# Patient Record
Sex: MALE | Race: WHITE | NOT HISPANIC OR LATINO | Employment: FULL TIME | URBAN - METROPOLITAN AREA
[De-identification: names, ages, dates, MRNs, and addresses within clinical notes are randomized per-mention and may not be internally consistent; named-entity substitution may affect disease eponyms.]

---

## 2017-01-30 DIAGNOSIS — E11.21 TYPE 2 DIABETES MELLITUS WITH DIABETIC NEPHROPATHY (H): Primary | ICD-10-CM

## 2017-02-01 RX ORDER — GLIMEPIRIDE 2 MG/1
TABLET ORAL
Qty: 90 TABLET | Refills: 1 | Status: SHIPPED | OUTPATIENT
Start: 2017-02-01 | End: 2017-06-16

## 2017-02-01 NOTE — TELEPHONE ENCOUNTER
Prescription approved per [facility] Refill Protocol.  Roberta Wilks RN- Triage [employer]

## 2017-02-01 NOTE — TELEPHONE ENCOUNTER
Glimepiride 2 mg         Last Written Prescription Date: 8/1/16  Last Fill Quantity: 90, # refills: 1  Last Office Visit with G, [facility] or [facility] prescribing provider:  10/3/16        BP Readings from Last 3 Encounters:   10/03/16 124/70   08/22/16 120/70   07/12/16 120/68     MICROL       <5   10/3/2016  No results found for this basename: microalbumin  CREATININE   Date Value Ref Range Status   08/23/2016 0.91 0.66 - 1.25 mg/dL Final   ]  GFR ESTIMATE   Date Value Ref Range Status   08/23/2016 84 >60 mL/min/1.7m2 Final   07/13/2016 >90 >60 mL/min/1.7m2 Final   11/16/2015 75 >60 mL/min/1.7m2 Final     GFR ESTIMATE IF BLACK   Date Value Ref Range Status   08/23/2016 >90 >60 mL/min/1.7m2 Final   07/13/2016 >90 >60 mL/min/1.7m2 Final   11/16/2015 >90 >60 mL/min/1.7m2 Final     CHOL      112   7/13/2016  HDL       73   7/13/2016  LDL       30   7/13/2016  LDL     89.4   9/20/2012  TRIG       44   7/13/2016  CHOLHDLRATIO      1.9   11/16/2015  AST       22   7/13/2016  ALT       29   7/13/2016  A1C      7.3   10/3/2016  A1C      7.0   7/13/2016  A1C      6.7   2/17/2016  A1C      8.2   11/16/2015  A1C      7.2   5/4/2015  POTASSIUM   Date Value Ref Range Status   08/23/2016 4.1 3.4 - 5.3 mmol/L Final

## 2017-02-08 DIAGNOSIS — E11.21 TYPE 2 DIABETES MELLITUS WITH DIABETIC NEPHROPATHY, WITHOUT LONG-TERM CURRENT USE OF INSULIN (H): Primary | ICD-10-CM

## 2017-02-13 NOTE — TELEPHONE ENCOUNTER
Prescription approved per [facility] Refill Protocol.  Roberta Wilks RN- Triage [employer]

## 2017-04-03 DIAGNOSIS — I10 ESSENTIAL HYPERTENSION WITH GOAL BLOOD PRESSURE LESS THAN 140/90: ICD-10-CM

## 2017-04-04 NOTE — TELEPHONE ENCOUNTER
amLODIPine (NORVASC) 5 MG      Last Written Prescription Date: 10/4/16  Last Fill Quantity: 90, # refills: 1  Last Office Visit with G, P or [facility] prescribing provider: 10/3/16       Potassium   Date Value Ref Range Status   08/23/2016 4.1 3.4 - 5.3 mmol/L Final     Creatinine   Date Value Ref Range Status   08/23/2016 0.91 0.66 - 1.25 mg/dL Final     BP Readings from Last 3 Encounters:   10/03/16 124/70   08/22/16 120/70   07/12/16 120/68

## 2017-04-05 RX ORDER — AMLODIPINE BESYLATE 5 MG/1
TABLET ORAL
Qty: 90 TABLET | Refills: 1 | Status: SHIPPED | OUTPATIENT
Start: 2017-04-05 | End: 2017-09-05

## 2017-04-27 ENCOUNTER — TELEPHONE (OUTPATIENT)
Dept: FAMILY MEDICINE | Facility: CLINIC | Age: 67
End: 2017-04-27

## 2017-04-27 NOTE — TELEPHONE ENCOUNTER
Reason for Call:  Form, our goal is to have forms completed with 72 hours, however, some forms may require a visit or additional information.    Type of letter, form or note:  Rosaura    Who is the form from?: Rosaura (if other please explain)    Where did the form come from: form was mailed in    What clinic location was the form placed at?: [facility]    Where the form was placed: Dr's Box: Gavin Sevilla MD    What number is listed as a contact on the form?: Rosaura, self addressed stamped envelope enclosed       Additional comments: Rosaura - Diabetic Form - Lancets    Call taken on 4/27/2017 at 2:01 PM by CATA REDDING

## 2017-05-01 DIAGNOSIS — E11.21 TYPE 2 DIABETES MELLITUS WITH DIABETIC NEPHROPATHY, WITHOUT LONG-TERM CURRENT USE OF INSULIN (H): ICD-10-CM

## 2017-05-01 NOTE — TELEPHONE ENCOUNTER
metFORMIN (GLUCOPHAGE) 500 MG tablet         Last Written Prescription Date: 2/13/17  Last Fill Quantity: 90, # refills: 0  Last Office Visit with [facility], [facility] or [facility] prescribing provider:  10/3/16        BP Readings from Last 3 Encounters:   10/03/16 124/70   08/22/16 120/70   07/12/16 120/68     Lab Results   Component Value Date    MICROL <5 10/03/2016     Lab Results   Component Value Date    UMALCR Unable to calculate due to low value 10/03/2016     Creatinine   Date Value Ref Range Status   08/23/2016 0.91 0.66 - 1.25 mg/dL Final   ]  GFR Estimate   Date Value Ref Range Status   08/23/2016 84 >60 mL/min/1.7m2 Final   07/13/2016 >90 >60 mL/min/1.7m2 Final   11/16/2015 75 >60 mL/min/1.7m2 Final     GFR Estimate If Black   Date Value Ref Range Status   08/23/2016 >90 >60 mL/min/1.7m2 Final   07/13/2016 >90 >60 mL/min/1.7m2 Final   11/16/2015 >90 >60 mL/min/1.7m2 Final     Lab Results   Component Value Date    CHOL 112 07/13/2016     Lab Results   Component Value Date    HDL 73 07/13/2016     Lab Results   Component Value Date    LDL 30 07/13/2016     Lab Results   Component Value Date    TRIG 44 07/13/2016     Lab Results   Component Value Date    CHOLHDLRATIO 1.9 11/16/2015     Lab Results   Component Value Date    AST 22 07/13/2016     Lab Results   Component Value Date    ALT 29 07/13/2016     Lab Results   Component Value Date    A1C 7.3 10/03/2016    A1C 7.0 07/13/2016    A1C 6.7 02/17/2016    A1C 8.2 11/16/2015    A1C 7.2 05/04/2015     Potassium   Date Value Ref Range Status   08/23/2016 4.1 3.4 - 5.3 mmol/L Final

## 2017-05-16 ENCOUNTER — TELEPHONE (OUTPATIENT)
Dept: FAMILY MEDICINE | Facility: CLINIC | Age: 67
End: 2017-05-16

## 2017-05-16 NOTE — TELEPHONE ENCOUNTER
Faxed forms back to [phone].  Dr. Sevilla asked me to call the patient and tell him that he needs to schedule an appointment to see him. I left him a VM asking him to make one.

## 2017-05-16 NOTE — TELEPHONE ENCOUNTER
Reason for Call:  Form, our goal is to have forms completed with 72 hours, however, some forms may require a visit or additional information.    Type of letter, form or note:  medical    Who is the form from?: Rosaura     Where did the form come from: form was faxed in    What clinic location was the form placed at?: [facility]    Where the form was placed: 's Box: Gavin Sevilla MD    What number is listed as a contact on the form?: [phone]       Additional comments: Rosaura diabetic     Call taken on 5/16/2017 at 1:10 PM by Rizwana Winston

## 2017-06-16 DIAGNOSIS — E78.5 HYPERLIPIDEMIA LDL GOAL <70: ICD-10-CM

## 2017-06-16 DIAGNOSIS — I10 ESSENTIAL HYPERTENSION WITH GOAL BLOOD PRESSURE LESS THAN 140/90: ICD-10-CM

## 2017-06-16 DIAGNOSIS — E11.21 TYPE 2 DIABETES MELLITUS WITH DIABETIC NEPHROPATHY (H): ICD-10-CM

## 2017-06-16 RX ORDER — GLIMEPIRIDE 2 MG/1
TABLET ORAL
Qty: 90 TABLET | Refills: 0 | Status: SHIPPED | OUTPATIENT
Start: 2017-06-16 | End: 2017-09-05

## 2017-06-16 RX ORDER — ATORVASTATIN CALCIUM 40 MG/1
TABLET, FILM COATED ORAL
Qty: 90 TABLET | Refills: 1 | Status: SHIPPED | OUTPATIENT
Start: 2017-06-16 | End: 2017-06-19

## 2017-06-16 RX ORDER — LOSARTAN POTASSIUM 100 MG/1
TABLET ORAL
Qty: 90 TABLET | Refills: 1 | Status: SHIPPED | OUTPATIENT
Start: 2017-06-16 | End: 2017-06-23

## 2017-06-16 NOTE — TELEPHONE ENCOUNTER
glimipiride ( amaryl) 2 mg    Last Written Prescription Date: 4/5/17  Last Fill Quantity: 90, # refills: 1  Last Office Visit with [facility], [facility] or [facility] prescribing provider:  10/3/16        BP Readings from Last 3 Encounters:   10/03/16 124/70   08/22/16 120/70   07/12/16 120/68     Lab Results   Component Value Date    MICROL <5 10/03/2016     Lab Results   Component Value Date    UMALCR Unable to calculate due to low value 10/03/2016     Creatinine   Date Value Ref Range Status   08/23/2016 0.91 0.66 - 1.25 mg/dL Final   ]  GFR Estimate   Date Value Ref Range Status   08/23/2016 84 >60 mL/min/1.7m2 Final   07/13/2016 >90 >60 mL/min/1.7m2 Final   11/16/2015 75 >60 mL/min/1.7m2 Final     GFR Estimate If Black   Date Value Ref Range Status   08/23/2016 >90 >60 mL/min/1.7m2 Final   07/13/2016 >90 >60 mL/min/1.7m2 Final   11/16/2015 >90 >60 mL/min/1.7m2 Final     Lab Results   Component Value Date    CHOL 112 07/13/2016     Lab Results   Component Value Date    HDL 73 07/13/2016     Lab Results   Component Value Date    LDL 30 07/13/2016     Lab Results   Component Value Date    TRIG 44 07/13/2016     Lab Results   Component Value Date    CHOLHDLRATIO 1.9 11/16/2015     Lab Results   Component Value Date    AST 22 07/13/2016     Lab Results   Component Value Date    ALT 29 07/13/2016     Lab Results   Component Value Date    A1C 7.3 10/03/2016    A1C 7.0 07/13/2016    A1C 6.7 02/17/2016    A1C 8.2 11/16/2015    A1C 7.2 05/04/2015     Potassium   Date Value Ref Range Status   08/23/2016 4.1 3.4 - 5.3 mmol/L Final   Atorvastatin (lipitor) 40 mg     Last Written Prescription Date: 8/19/16  Last Fill Quantity: 90, # refills: 3    Last Office Visit with [facility], [facility] or [facility] prescribing provider:  10/3/16     Future Office Visit:      Cholesterol   Date Value Ref Range Status   07/13/2016 112 <200 mg/dL Final     HDL Cholesterol   Date Value Ref Range Status   07/13/2016 73 >39 mg/dL Final     LDL Cholesterol Calculated    Date Value Ref Range Status   07/13/2016 30 <100 mg/dL Final     Comment:     Desirable:       <100 mg/dl     Triglycerides   Date Value Ref Range Status   07/13/2016 44 <150 mg/dL Final     Comment:     Fasting specimen     Cholesterol/HDL Ratio   Date Value Ref Range Status   11/16/2015 1.9 0.0 - 5.0 Final     ALT   Date Value Ref Range Status   07/13/2016 29 0 - 70 U/L Final      Losartan (cozaar) 100 mg     Last Written Prescription Date: 8/19/16 Last Fill Quantity: 90, # refills: 3  [facility] Visit with [facility], [facility] or [facility] prescribing provider: percy       Potassium   Date Value Ref Range Status   08/23/2016 4.1 3.4 - 5.3 mmol/L Final     Creatinine   Date Value Ref Range Status   08/23/2016 0.91 0.66 - 1.25 mg/dL Final     BP Readings from Last 3 Encounters:   10/03/16 124/70   08/22/16 120/70   07/12/16 120/68

## 2017-06-19 DIAGNOSIS — E78.5 HYPERLIPIDEMIA LDL GOAL <70: ICD-10-CM

## 2017-06-19 RX ORDER — ATORVASTATIN CALCIUM 40 MG/1
TABLET, FILM COATED ORAL
Qty: 90 TABLET | Refills: 3 | OUTPATIENT
Start: 2017-06-19

## 2017-06-19 RX ORDER — LOSARTAN POTASSIUM 100 MG/1
TABLET ORAL
Qty: 90 TABLET | Refills: 3 | OUTPATIENT
Start: 2017-06-19

## 2017-06-20 ENCOUNTER — TELEPHONE (OUTPATIENT)
Dept: FAMILY MEDICINE | Facility: CLINIC | Age: 67
End: 2017-06-20

## 2017-06-20 DIAGNOSIS — I10 HYPERTENSION GOAL BP (BLOOD PRESSURE) < 140/90: ICD-10-CM

## 2017-06-20 DIAGNOSIS — Z12.5 SCREENING FOR PROSTATE CANCER: ICD-10-CM

## 2017-06-20 DIAGNOSIS — E11.21 TYPE 2 DIABETES MELLITUS WITH DIABETIC NEPHROPATHY, WITHOUT LONG-TERM CURRENT USE OF INSULIN (H): ICD-10-CM

## 2017-06-20 DIAGNOSIS — E78.5 HYPERLIPIDEMIA LDL GOAL <100: Primary | ICD-10-CM

## 2017-06-20 NOTE — TELEPHONE ENCOUNTER
ATORVASTATIN CALCIUM 40 MG Tablet    Last Written Prescription Date: 06/16/2017  Last Fill Quantity: 90, # refills: 1  Last Office Visit with [facility], [facility] or [facility] prescribing provider: 10/03/2016       Lab Results   Component Value Date    CHOL 112 07/13/2016     Lab Results   Component Value Date    HDL 73 07/13/2016     Lab Results   Component Value Date    LDL 30 07/13/2016     Lab Results   Component Value Date    TRIG 44 07/13/2016     Lab Results   Component Value Date    CHOLHDLRATIO 1.9 11/16/2015

## 2017-06-20 NOTE — TELEPHONE ENCOUNTER
Reason for Call: Request for an order:    Order or referral being requested: Pre-Physical Labs    Date needed: as soon as possible    Has the patient been seen by the PCP for this problem? YES    Additional comments: Patient is scheduled for a Medicare wellness exam on 8/23/17 and he is requesting to have pre-physical labs drawn beforehand. Please call patient to schedule a lab only appointment after the orders have been approved.     Phone number Patient can be reached at:  Home number on file [phone] (home) or Work number on file:  [phone] (work)    Best Time:  Anytime    Can we leave a detailed message on this number?  YES    Call taken on 6/20/2017 at 11:29 AM by David Burnette

## 2017-06-21 RX ORDER — ATORVASTATIN CALCIUM 40 MG/1
TABLET, FILM COATED ORAL
Qty: 90 TABLET | Refills: 0 | Status: SHIPPED | OUTPATIENT
Start: 2017-06-21 | End: 2017-09-05

## 2017-06-23 DIAGNOSIS — I10 ESSENTIAL HYPERTENSION WITH GOAL BLOOD PRESSURE LESS THAN 140/90: ICD-10-CM

## 2017-06-26 RX ORDER — LOSARTAN POTASSIUM 100 MG/1
TABLET ORAL
Qty: 90 TABLET | Refills: 0 | Status: SHIPPED | OUTPATIENT
Start: 2017-06-26 | End: 2017-09-05

## 2017-06-26 NOTE — TELEPHONE ENCOUNTER
cozaar      Last Written Prescription Date: 6-16-17  Last Fill Quantity: 90, # refills: 1  Last Office Visit with [facility], [facility] or [facility] prescribing provider: 10-3-16  Next 5 appointments (look out 90 days)     Aug 23, 2017  7:30 AM CDT   PHYSICAL with Gavin [facility] MD   [facility]    [address] [place] [id number]   [phone]                   Potassium   Date Value Ref Range Status   08/23/2016 4.1 3.4 - 5.3 mmol/L Final     Creatinine   Date Value Ref Range Status   08/23/2016 0.91 0.66 - 1.25 mg/dL Final     BP Readings from Last 3 Encounters:   10/03/16 124/70   08/22/16 120/70   07/12/16 120/68     Prescription approved per [facility] Refill Protocol.

## 2017-06-27 ENCOUNTER — TRANSFERRED RECORDS (OUTPATIENT)
Dept: HEALTH INFORMATION MANAGEMENT | Facility: CLINIC | Age: 67
End: 2017-06-27

## 2017-07-19 DIAGNOSIS — E11.21 TYPE 2 DIABETES MELLITUS WITH DIABETIC NEPHROPATHY, WITHOUT LONG-TERM CURRENT USE OF INSULIN (H): ICD-10-CM

## 2017-07-20 NOTE — TELEPHONE ENCOUNTER
metFORMIN (GLUCOPHAGE) 500 MG tablet         Last Written Prescription Date: 5/3/17  Last Fill Quantity: 90, # refills: 0  Last Office Visit with [facility], [facility] or  [facility] prescribing provider:  10/3/16   Next 5 appointments (look out 90 days)     Aug 23, 2017  7:30 AM CDT   PHYSICAL with Gavin Sevilla MD   [facility]    [address] [id number] [phone]                   BP Readings from Last 3 Encounters:   10/03/16 124/70   08/22/16 120/70   07/12/16 120/68     Lab Results   Component Value Date    MICROL <5 10/03/2016     Lab Results   Component Value Date    UMALCR Unable to calculate due to low value 10/03/2016     Creatinine   Date Value Ref Range Status   08/23/2016 0.91 0.66 - 1.25 mg/dL Final   ]  GFR Estimate   Date Value Ref Range Status   08/23/2016 84 >60 mL/min/1.7m2 Final   07/13/2016 >90 >60 mL/min/1.7m2 Final   11/16/2015 75 >60 mL/min/1.7m2 Final     GFR Estimate If Black   Date Value Ref Range Status   08/23/2016 >90 >60 mL/min/1.7m2 Final   07/13/2016 >90 >60 mL/min/1.7m2 Final   11/16/2015 >90 >60 mL/min/1.7m2 Final     Lab Results   Component Value Date    CHOL 112 07/13/2016     Lab Results   Component Value Date    HDL 73 07/13/2016     Lab Results   Component Value Date    LDL 30 07/13/2016     Lab Results   Component Value Date    TRIG 44 07/13/2016     Lab Results   Component Value Date    CHOLHDLRATIO 1.9 11/16/2015     Lab Results   Component Value Date    AST 22 07/13/2016     Lab Results   Component Value Date    ALT 29 07/13/2016     Lab Results   Component Value Date    A1C 7.3 10/03/2016    A1C 7.0 07/13/2016    A1C 6.7 02/17/2016    A1C 8.2 11/16/2015    A1C 7.2 05/04/2015     Potassium   Date Value Ref Range Status   08/23/2016 4.1 3.4 - 5.3 mmol/L Final

## 2017-08-18 DIAGNOSIS — Z12.5 SCREENING FOR PROSTATE CANCER: ICD-10-CM

## 2017-08-18 DIAGNOSIS — I10 HYPERTENSION GOAL BP (BLOOD PRESSURE) < 140/90: ICD-10-CM

## 2017-08-18 DIAGNOSIS — E78.5 HYPERLIPIDEMIA LDL GOAL <100: ICD-10-CM

## 2017-08-18 DIAGNOSIS — E11.21 TYPE 2 DIABETES MELLITUS WITH DIABETIC NEPHROPATHY, WITHOUT LONG-TERM CURRENT USE OF INSULIN (H): ICD-10-CM

## 2017-08-18 LAB
ALBUMIN SERPL-MCNC: 4.2 G/DL (ref 3.4–5)
ALBUMIN UR-MCNC: NEGATIVE MG/DL
ALP SERPL-CCNC: 45 U/L (ref 40–150)
ALT SERPL W P-5'-P-CCNC: 24 U/L (ref 0–70)
ANION GAP SERPL CALCULATED.3IONS-SCNC: 7 MMOL/L (ref 3–14)
APPEARANCE UR: CLEAR
AST SERPL W P-5'-P-CCNC: 21 U/L (ref 0–45)
BASOPHILS # BLD AUTO: 0.1 10E9/L (ref 0–0.2)
BASOPHILS NFR BLD AUTO: 0.7 %
BILIRUB SERPL-MCNC: 0.5 MG/DL (ref 0.2–1.3)
BILIRUB UR QL STRIP: NEGATIVE
BUN SERPL-MCNC: 13 MG/DL (ref 7–30)
CALCIUM SERPL-MCNC: 8.9 MG/DL (ref 8.5–10.1)
CHLORIDE SERPL-SCNC: 102 MMOL/L (ref 94–109)
CHOLEST SERPL-MCNC: 102 MG/DL
CO2 SERPL-SCNC: 27 MMOL/L (ref 20–32)
COLOR UR AUTO: YELLOW
CREAT SERPL-MCNC: 0.71 MG/DL (ref 0.66–1.25)
CREAT UR-MCNC: 15 MG/DL
DIFFERENTIAL METHOD BLD: ABNORMAL
EOSINOPHIL # BLD AUTO: 0.5 10E9/L (ref 0–0.7)
EOSINOPHIL NFR BLD AUTO: 7 %
ERYTHROCYTE [DISTWIDTH] IN BLOOD BY AUTOMATED COUNT: 13.7 % (ref 10–15)
GFR SERPL CREATININE-BSD FRML MDRD: >90 ML/MIN/1.7M2
GLUCOSE SERPL-MCNC: 119 MG/DL (ref 70–99)
GLUCOSE UR STRIP-MCNC: NEGATIVE MG/DL
HBA1C MFR BLD: 6.3 % (ref 4.3–6)
HCT VFR BLD AUTO: 38.5 % (ref 40–53)
HDLC SERPL-MCNC: 84 MG/DL
HGB BLD-MCNC: 13.2 G/DL (ref 13.3–17.7)
HGB UR QL STRIP: NEGATIVE
KETONES UR STRIP-MCNC: NEGATIVE MG/DL
LDLC SERPL CALC-MCNC: 13 MG/DL
LEUKOCYTE ESTERASE UR QL STRIP: NEGATIVE
LYMPHOCYTES # BLD AUTO: 1.5 10E9/L (ref 0.8–5.3)
LYMPHOCYTES NFR BLD AUTO: 21.4 %
MCH RBC QN AUTO: 30.8 PG (ref 26.5–33)
MCHC RBC AUTO-ENTMCNC: 34.3 G/DL (ref 31.5–36.5)
MCV RBC AUTO: 90 FL (ref 78–100)
MICROALBUMIN UR-MCNC: <5 MG/L
MICROALBUMIN/CREAT UR: NORMAL MG/G CR (ref 0–17)
MONOCYTES # BLD AUTO: 0.9 10E9/L (ref 0–1.3)
MONOCYTES NFR BLD AUTO: 13.3 %
NEUTROPHILS # BLD AUTO: 4 10E9/L (ref 1.6–8.3)
NEUTROPHILS NFR BLD AUTO: 57.6 %
NITRATE UR QL: NEGATIVE
NONHDLC SERPL-MCNC: 18 MG/DL
PH UR STRIP: 7.5 PH (ref 5–7)
PLATELET # BLD AUTO: 216 10E9/L (ref 150–450)
POTASSIUM SERPL-SCNC: 3.9 MMOL/L (ref 3.4–5.3)
PROT SERPL-MCNC: 7.5 G/DL (ref 6.8–8.8)
PSA SERPL-ACNC: 0.9 UG/L (ref 0–4)
RBC # BLD AUTO: 4.29 10E12/L (ref 4.4–5.9)
RBC #/AREA URNS AUTO: ABNORMAL /HPF
SODIUM SERPL-SCNC: 136 MMOL/L (ref 133–144)
SOURCE: ABNORMAL
SP GR UR STRIP: 1.01 (ref 1–1.03)
TRIGL SERPL-MCNC: 24 MG/DL
UROBILINOGEN UR STRIP-ACNC: 0.2 EU/DL (ref 0.2–1)
WBC # BLD AUTO: 7 10E9/L (ref 4–11)
WBC #/AREA URNS AUTO: ABNORMAL /HPF

## 2017-08-18 PROCEDURE — 80053 COMPREHEN METABOLIC PANEL: CPT | Performed by: FAMILY MEDICINE

## 2017-08-18 PROCEDURE — 36415 COLL VENOUS BLD VENIPUNCTURE: CPT | Performed by: FAMILY MEDICINE

## 2017-08-18 PROCEDURE — 83036 HEMOGLOBIN GLYCOSYLATED A1C: CPT | Performed by: FAMILY MEDICINE

## 2017-08-18 PROCEDURE — 85025 COMPLETE CBC W/AUTO DIFF WBC: CPT | Performed by: FAMILY MEDICINE

## 2017-08-18 PROCEDURE — 81001 URINALYSIS AUTO W/SCOPE: CPT | Performed by: FAMILY MEDICINE

## 2017-08-18 PROCEDURE — 80061 LIPID PANEL: CPT | Performed by: FAMILY MEDICINE

## 2017-08-18 PROCEDURE — G0103 PSA SCREENING: HCPCS | Performed by: FAMILY MEDICINE

## 2017-08-18 PROCEDURE — 82043 UR ALBUMIN QUANTITATIVE: CPT | Performed by: FAMILY MEDICINE

## 2017-08-23 ENCOUNTER — OFFICE VISIT (OUTPATIENT)
Dept: FAMILY MEDICINE | Facility: CLINIC | Age: 67
End: 2017-08-23
Payer: MEDICARE

## 2017-08-23 VITALS
TEMPERATURE: 97 F | RESPIRATION RATE: 16 BRPM | BODY MASS INDEX: 28.29 KG/M2 | DIASTOLIC BLOOD PRESSURE: 70 MMHG | SYSTOLIC BLOOD PRESSURE: 110 MMHG | HEART RATE: 58 BPM | WEIGHT: 191 LBS | HEIGHT: 69 IN

## 2017-08-23 DIAGNOSIS — E11.21 TYPE 2 DIABETES MELLITUS WITH DIABETIC NEPHROPATHY, WITHOUT LONG-TERM CURRENT USE OF INSULIN (H): ICD-10-CM

## 2017-08-23 DIAGNOSIS — Z23 NEED FOR PROPHYLACTIC VACCINATION AGAINST STREPTOCOCCUS PNEUMONIAE (PNEUMOCOCCUS): ICD-10-CM

## 2017-08-23 DIAGNOSIS — I10 HYPERTENSION GOAL BP (BLOOD PRESSURE) < 140/90: ICD-10-CM

## 2017-08-23 DIAGNOSIS — Z86.0100 HISTORY OF COLONIC POLYPS: ICD-10-CM

## 2017-08-23 DIAGNOSIS — E78.5 HYPERLIPIDEMIA LDL GOAL <100: ICD-10-CM

## 2017-08-23 DIAGNOSIS — Z13.89 SCREENING FOR DIABETIC PERIPHERAL NEUROPATHY: ICD-10-CM

## 2017-08-23 DIAGNOSIS — Z00.00 ROUTINE MEDICAL EXAM: Primary | ICD-10-CM

## 2017-08-23 DIAGNOSIS — I25.10 CORONARY ARTERY DISEASE INVOLVING NATIVE CORONARY ARTERY OF NATIVE HEART WITHOUT ANGINA PECTORIS: ICD-10-CM

## 2017-08-23 PROCEDURE — G0438 PPPS, INITIAL VISIT: HCPCS | Performed by: FAMILY MEDICINE

## 2017-08-23 PROCEDURE — G0009 ADMIN PNEUMOCOCCAL VACCINE: HCPCS | Performed by: FAMILY MEDICINE

## 2017-08-23 PROCEDURE — 90670 PCV13 VACCINE IM: CPT | Performed by: FAMILY MEDICINE

## 2017-08-23 RX ORDER — OMEGA-3 FATTY ACIDS/FISH OIL 300-1000MG
1 CAPSULE ORAL DAILY
Qty: 90 CAPSULE | COMMUNITY
Start: 2017-08-23 | End: 2022-02-10

## 2017-08-23 NOTE — PROGRESS NOTES
SUBJECTIVE:   Jakub Robles is a 67 year old male who presents for Preventive Visit.  click delete button to remove this line now  click delete button to remove this line now  Are you in the first 12 months of your Medicare coverage?  No    Physical   Annual:     Getting at least 3 servings of Calcium per day::  Yes    Bi-annual eye exam::  Yes    Dental care twice a year::  Yes    Sleep apnea or symptoms of sleep apnea::  None    Diet::  Low salt, Low fat/cholesterol, Diabetic, Carbohydrate counting and Gluten-free/reduced    Taking medications regularly::  Yes    Medication side effects::  No significant flushing and Muscle aches    Additional concerns today::  YES      COGNITIVE SCREEN  1) Repeat 3 items (Banana, Sunrise, Chair)    2) Clock draw: NORMAL  3) 3 item recall: Recalls 3 objects  Results: 3 items recalled: COGNITIVE IMPAIRMENT LESS LIKELY    Mini-CogTM Copyright S Evelyn. Licensed by the author for use in [facility]; reprinted with permission (boni@[email or web address]). All rights reserved.          Reviewed and updated as needed this visit by clinical staff         Reviewed and updated as needed this visit by Provider      Social History   Substance Use Topics     Smoking status: Current Some Day Smoker     Types: Cigars     Smokeless tobacco: Never Used      Comment: Now smoking an Ecig     Alcohol use Yes      Comment: couple glasses wine/day       The patient does not drink >3 drinks per day nor >7 drinks per week.      Today's PHQ-2 Score: PHQ-2 ( 1999 Pfizer) 8/23/2017   Q1: Little interest or pleasure in doing things 0   Q2: Feeling down, depressed or hopeless 0   PHQ-2 Score 0   Q1: Little interest or pleasure in doing things Not at all   Q2: Feeling down, depressed or hopeless Not at all   PHQ-2 Score 0       Do you feel safe in your environment - Yes    Do you have a Health Care Directive?: Yes: Patient states has Advance Directive and will bring in a copy to clinic.    Current  providers sharing in care for this patient include:   Patient Care Team:  Gavin Sevilla MD as PCP - General      Hearing impairment: No    Ability to successfully perform activities of daily living: Yes, no assistance needed     Fall risk:  Fallen 2 or more times in the past year?: No  Any fall with injury in the past year?: No      Home safety:  none identified  click delete button to remove this line now    The following health maintenance items are reviewed in Epic and correct as of today:Health Maintenance   Topic Date Due     HEPATITIS C SCREENING  07/17/1969     ADVANCE DIRECTIVE PLANNING Q5 YRS  07/17/2006     PNEUMO 5YR BOOST DUE AFTER AGE 65 (NO IB MSG) (1) 08/17/2011     FOOT EXAM Q1 YEAR  05/04/2016     PNEUMOCOCCAL (1 of 2 - PCV13) 07/17/2016     AORTIC ANEURYSM SCREENING (SYSTEM ASSIGNED)  07/17/2016     INFLUENZA VACCINE (SYSTEM ASSIGNED)  09/01/2017     FALL RISK ASSESSMENT  10/03/2017     KARLY QUESTIONNAIRE 1 YEAR  10/03/2017     PHQ-9 Q1YR  10/03/2017     TSH W/ FREE T4 REFLEX Q2 YEAR  02/17/2018     A1C Q6 MO  02/18/2018     EYE EXAM Q1 YEAR  06/27/2018     CREATININE Q1 YEAR  08/18/2018     LIPID MONITORING Q1 YEAR  08/18/2018     MICROALBUMIN Q1 YEAR  08/18/2018     COLONOSCOPY Q5 YR  03/19/2020     TETANUS IMMUNIZATION (SYSTEM ASSIGNED)  06/27/2021     Patient Active Problem List   Diagnosis     Agatston coronary artery calcium score greater than 400 in 5-13 Neg treadmill stress test in 2008      Type 2 diabetes mellitus with diabetic nephropathy (HCC)     Hypertension goal BP (blood pressure) < 140/90     Hyperlipidemia LDL goal <100     History of colonic polyps     Tobacco abuse: [age]y/o@ 1-2ppd=70 pk yr hx      Need for prophylactic vaccination against Streptococcus pneumoniae (pneumococcus)-ribs hurt too much      Microalbuminuria     Coronary artery disease involving native coronary artery of native heart without angina pectoris     Past Surgical History:   Procedure Laterality  "Date     CARDIAC SURGERY  2016    stents x 2     HEAD & NECK SURGERY      dental implants     implant[      dental     SINUS SURGERY  1998       Social History   Substance Use Topics     Smoking status: Current Some Day Smoker     Types: Cigars     Smokeless tobacco: Never Used      Comment: Now smoking an Ecig     Alcohol use Yes      Comment: couple glasses wine/day     Family History   Problem Relation Age of Onset     Arthritis Mother      CANCER Mother 83     pancreatic cancer     Circulatory Father      AAA     CANCER Brother      unknown primary             Pneumonia Vaccine:Adults age 65+ who have not received previous Pneumovax (PPSV23) or PCV13 as an adult: Should first be given PCV13 AND then should be given PPSV23 6-12 months after PCV13    ROS:  C: NEGATIVE for fever, chills, change in weight  I: NEGATIVE for worrisome rashes, moles or lesions  E: NEGATIVE for vision changes or irritation  E/M: NEGATIVE for ear, mouth and throat problems  R: NEGATIVE for significant cough or SOB  B: NEGATIVE for masses, tenderness or discharge  CV: NEGATIVE for chest pain, palpitations or peripheral edema  GI: NEGATIVE for nausea, abdominal pain, heartburn, or change in bowel habits  : NEGATIVE for frequency, dysuria, or hematuria  M: NEGATIVE for significant arthralgias or myalgia  N: NEGATIVE for weakness, dizziness or paresthesias  E: NEGATIVE for temperature intolerance, skin/hair changes  H: NEGATIVE for bleeding problems  P: NEGATIVE for changes in mood or affect    OBJECTIVE:   There were no vitals taken for this visit. Estimated body mass index is 27.91 kg/(m^2) as calculated from the following:    Height as of 9/15/16: 5' 9\" (1.753 m).    Weight as of 10/3/16: 189 lb (85.7 kg).  EXAM:   GENERAL: healthy, alert and no distress  EYES: Eyes grossly normal to inspection, PERRL and conjunctivae and sclerae normal  HENT: ear canals and TM's normal, nose and mouth without ulcers or lesions  NECK: no adenopathy, no " "asymmetry, masses, or scars and thyroid normal to palpation  RESP: lungs clear to auscultation - no rales, rhonchi or wheezes  CV: regular rate and rhythm, normal S1 S2, no S3 or S4, no murmur, click or rub, no peripheral edema and peripheral pulses strong  ABDOMEN: soft, nontender, no hepatosplenomegaly, no masses and bowel sounds normal   (male): normal male genitalia without lesions or urethral discharge, no hernia  RECTAL: normal sphincter tone, no rectal masses, prostate normal size, smooth, nontender without nodules or masses  MS: no gross musculoskeletal defects noted, no edema  SKIN: no suspicious lesions or rashes  NEURO: Normal strength and tone, mentation intact and speech normal  PSYCH: mentation appears normal, affect normal/bright    ASSESSMENT / PLAN:       ICD-10-CM    1. Routine medical exam Z00.00    2. Screening for diabetic peripheral neuropathy Z13.89 FOOT EXAM  NO CHARGE [62530.114]   3. Need for prophylactic vaccination against Streptococcus pneumoniae (pneumococcus) Z23        End of Life Planning:  Patient currently has an advanced directive: No.  I have verified the patient's ablity to prepare an advanced directive/make health care decisions.  Literature was provided to assist patient in preparing an advanced directive.    COUNSELING:  Reviewed preventive health counseling, as reflected in patient instructions       Prostate cancer screening        Estimated body mass index is 27.91 kg/(m^2) as calculated from the following:    Height as of 9/15/16: 5' 9\" (1.753 m).    Weight as of 10/3/16: 189 lb (85.7 kg).     reports that he has been smoking Cigars.  He has never used smokeless tobacco.        Appropriate preventive services were discussed with this patient, including applicable screening as appropriate for cardiovascular disease, diabetes, osteopenia/osteoporosis, and glaucoma.  As appropriate for age/gender, discussed screening for colorectal cancer, prostate cancer, breast cancer, " and cervical cancer. Checklist reviewing preventive services available has been given to the patient.    Reviewed patients plan of care and provided an AVS. The Basic Care Plan (routine screening as documented in Health Maintenance) for Jakub meets the Care Plan requirement. This Care Plan has been established and reviewed with the Patient.    Counseling Resources:  ATP IV Guidelines  Pooled Cohorts Equation Calculator  Breast Cancer Risk Calculator  FRAX Risk Assessment  ICSI Preventive Guidelines  Dietary Guidelines for Americans, 2010  [employer]'s My[employer]ate  ASA Prophylaxis  Lung CA Screening    Gavin Sevilla MD  [facility] XERXESAnswers for HPI/ROS submitted by the patient on 8/23/2017   PHQ-2 Score: 0

## 2017-08-23 NOTE — MR AVS SNAPSHOT
After Visit Summary   8/23/2017    Jakub Robles    MRN: [id number]           Patient Information     Date Of Birth          1950        Visit Information        Provider Department      8/23/2017 7:30 AM Gavin Sevilla MD [facility] [address]        Today's Diagnoses     Routine medical exam    -  1    Screening for diabetic peripheral neuropathy        Need for prophylactic vaccination against Streptococcus pneumoniae (pneumococcus)        Type 2 diabetes mellitus with diabetic nephropathy, without long-term current use of insulin (H)        Hypertension goal BP (blood pressure) < 140/90        Hyperlipidemia LDL goal <100        History of colonic polyps        Coronary artery disease involving native coronary artery of native heart without angina pectoris          Care Instructions      Preventive Health Recommendations:   Male Ages 65 and over    Yearly exam:             See your health care provider every year in order to  o   Review health changes.   o   Discuss preventive care.    o   Review your medicines if your doctor has prescribed any.    Talk with your health care provider about whether you should have a test to screen for prostate cancer (PSA).    Every 3 years, have a diabetes test (fasting glucose). If you are at risk for diabetes, you should have this test more often.    Every 5 years, have a cholesterol test. Have this test more often if you are at risk for high cholesterol or heart disease.     Every 10 years, have a colonoscopy. Or, have a yearly FIT test (stool test). These exams will check for colon cancer.    Talk to with your health care provider about screening for Abdominal Aortic Aneurysm if you have a family history of AAA or have a history of smoking.    Shots:     Get a flu shot each year.     Get a tetanus shot every 10 years.     Talk to your doctor about your pneumonia vaccines. There are now two you should receive - Pneumovax  (PPSV 23) and Prevnar (PCV 13).     Talk to your doctor about a shingles vaccine.     Talk to your doctor about the hepatitis B vaccine.  Nutrition:     Eat at least 5 servings of fruits and vegetables each day.     Eat whole-grain bread, whole-wheat pasta and brown rice instead of white grains and rice.     Talk to your provider about Calcium and Vitamin D.   Lifestyle    Exercise for at least 150 minutes a week (30 minutes a day, 5 days a week). This will help you control your weight and prevent disease.     Limit alcohol to one drink per day.     No smoking.     Wear sunscreen to prevent skin cancer.     See your dentist every six months for an exam and cleaning.     See your eye doctor every 1 to 2 years to screen for conditions such as glaucoma, macular degeneration, cataracts, etc           Follow-ups after your visit        Who to contact     If you have questions or need follow up information about today's clinic visit or your schedule please contact [facility] directly at [phone].  Normal or non-critical lab and imaging results will be communicated to you by [email or web address], letter or phone within 4 business days after the clinic has received the results. If you do not hear from us within 7 days, please contact the clinic through [employer] or phone. If you have a critical or abnormal lab result, we will notify you by phone as soon as possible.  Submit refill requests through [employer] or call your pharmacy and they will forward the refill request to us. Please allow 3 business days for your refill to be completed.          Additional Information About Your Visit        [employer] Information     [employer] gives you secure access to your electronic health record. If you see a primary care provider, you can also send messages to your care team and make appointments. If you have questions, please call your primary care clinic.  If you do not have a primary care provider, please call  "[phone] and they will assist you.        Care EveryWhere ID     This is your Care EveryWhere ID. This could be used by other organizations to access your [place] medical records  [id number]        Your Vitals Were     Pulse Temperature Respirations Height BMI (Body Mass Index)       58 97  F (36.1  C) (Tympanic) 16 5' 8.5\" (1.74 m) 28.62 kg/m2        Blood Pressure from Last 3 Encounters:   08/23/17 110/70   10/03/16 124/70   08/22/16 120/70    Weight from Last 3 Encounters:   08/23/17 191 lb (86.6 kg)   10/03/16 189 lb (85.7 kg)   09/15/16 189 lb 6.4 oz (85.9 kg)              We Performed the Following     ADMIN 1st VACCINE     FOOT EXAM  NO CHARGE [[id number]]     PNEUMOCOCCAL CONJ VACCINE 13 VALENT IM          Today's Medication Changes          These changes are accurate as of: 8/23/17  8:33 AM.  If you have any questions, ask your nurse or doctor.               Start taking these medicines.        Dose/Directions    omega 3 1000 MG Caps   Used for:  Routine medical exam   Started by:  Gavin Sevilla MD        Dose:  1 g   Take 1 g by mouth daily   Quantity:  90 capsule   Refills:  0            Where to get your medicines      Some of these will need a paper prescription and others can be bought over the counter.  Ask your nurse if you have questions.     You don't need a prescription for these medications     omega 3 1000 MG Caps                Primary Care Provider Office Phone # Fax #    Gavin Sevilla [phone] [address] [facility] [address]        Equal Access to Services     [facility] AH: Hadii devan cutler hadasho Soomaali, waaxda luqadaha, qaybta kaalmada dilan [place]. So [facility] [phone].    ATENCIÓN: Si habla español, tiene a de león disposición servicios gratuitos de asistencia lingüística. Llame al [phone].    We comply with applicable federal civil rights laws and [place] laws. We do not discriminate on " the basis of race, color, national origin, age, disability sex, sexual orientation or gender identity.            Thank you!     Thank you for choosing [facility]  for your care. Our goal is always to provide you with excellent care. Hearing back from our patients is one way we can continue to improve our services. Please take a few minutes to complete the written survey that you may receive in the mail after your visit with us. Thank you!             Your Updated Medication List - Protect others around you: Learn how to safely use, store and throw away your medicines at www.disposemymeds.org.          This list is accurate as of: 8/23/17  8:33 AM.  Always use your most recent med list.                   Brand Name Dispense Instructions for use Diagnosis    amLODIPine 5 MG tablet    NORVASC    90 tablet    TAKE 1 TABLET (5 MG) BY MOUTH DAILY    Essential hypertension with goal blood pressure less than 140/90       aspirin 81 MG tablet      Take 1 tablet by mouth daily        atorvastatin 40 MG tablet    LIPITOR    90 tablet    TAKE 1 TABLET EVERY DAY    Hyperlipidemia LDL goal <70       blood glucose monitoring lancets     2 Box    by In Vitro route 2 times daily    Type 2 diabetes mellitus with diabetic neuropathy (H)       blood glucose monitoring test strip    ACCU-CHEK RYAN PLUS    200 each    USE TO TEST BLOOD SUGARS TWICE DAILY    Type 2 diabetes mellitus with diabetic neuropathy (H)       clopidogrel 75 MG tablet    PLAVIX    90 tablet    Take 1 tablet (75 mg) by mouth daily    Coronary artery disease involving native coronary artery of native heart without angina pectoris       glimepiride 2 MG tablet    AMARYL    90 tablet    TAKE 1 TABLET EVERY MORNING BEFORE BREAKFAST    Type 2 diabetes mellitus with diabetic nephropathy (H)       losartan 100 MG tablet    COZAAR    90 tablet    TAKE 1 TABLET ONE TIME DAILY    Essential hypertension with goal blood pressure less than 140/90        metFORMIN 500 MG tablet    GLUCOPHAGE    90 tablet    TAKE 1 TABLET EVERY DAY WITH DINNER (NEED MD APPOINTMENT)    Type 2 diabetes mellitus with diabetic nephropathy, without long-term current use of insulin (H)       NITROGLYCERIN SL      Place 0.4 mg under the tongue as needed for chest pain        omega 3 1000 MG Caps     90 capsule    Take 1 g by mouth daily    Routine medical exam       * order for DME     200 each    2 times daily as needed Test strips for pt's glucometer, brand as covered by insurance. Test daily and prn.    Type II or unspecified type diabetes mellitus without mention of complication, not stated as uncontrolled       * order for DME     1 each    Equipment being ordered: rib belt    Contusion of rib, left, initial encounter       * order for DME     1 each    Glucometer, brand as covered by insurance.    Type 2 diabetes, HbA1C goal < 8% (H)       * order for DME     1 Device    Equipment being ordered: Anay Accu-chek monitor- dx 250.00- checks BID but has strips    Type II or unspecified type diabetes mellitus without mention of complication, not stated as uncontrolled       * order for DME     100 each    Lancets for Anay glucometer,pt tests twice daily.    Type II or unspecified type diabetes mellitus without mention of complication, not stated as uncontrolled       * Notice:  This list has 5 medication(s) that are the same as other medications prescribed for you. Read the directions carefully, and ask your doctor or other care provider to review them with you.

## 2017-08-23 NOTE — NURSING NOTE
Screening Questionnaire for Adult Immunization    Are you sick today?   No   Do you have allergies to medications, food, a vaccine component or latex?   No   Have you ever had a serious reaction after receiving a vaccination?   No   Do you have a long-term health problem with heart disease, lung disease, asthma, kidney disease, metabolic disease (e.g. diabetes), anemia, or other blood disorder?   No   Do you have cancer, leukemia, HIV/AIDS, or any other immune system problem?   No   In the past 3 months, have you taken medications that affect  your immune system, such as prednisone, other steroids, or anticancer drugs; drugs for the treatment of rheumatoid arthritis, Crohn s disease, or psoriasis; or have you had radiation treatments?   No   Have you had a seizure, or a brain or other nervous system problem?   No   During the past year, have you received a transfusion of blood or blood     products, or been given immune (gamma) globulin or antiviral drug?   No   For women: Are you pregnant or is there a chance you could become        pregnant during the next month?   No   Have you received any vaccinations in the past 4 weeks?   No     Immunization questionnaire answers were all negative.        Per orders of Dr. Sevilla, injection of prevnar given by Kalyn Olivares. Patient instructed to remain in clinic for 15 minutes afterwards, and to report any adverse reaction to me immediately.       Screening performed by Kalyn Olivares on 8/23/2017 at 8:24 AM.

## 2017-08-23 NOTE — NURSING NOTE
"Chief Complaint   Patient presents with     Physical       Initial /70  Pulse 58  Temp 97  F (36.1  C) (Tympanic)  Resp 16  Ht 5' 8.5\" (1.74 m)  Wt 191 lb (86.6 kg)  BMI 28.62 kg/m2 Estimated body mass index is 28.62 kg/(m^2) as calculated from the following:    Height as of this encounter: 5' 8.5\" (1.74 m).    Weight as of this encounter: 191 lb (86.6 kg).  Medication Reconciliation: complete       Kalyn Olivares CMA      "

## 2017-08-26 NOTE — PROGRESS NOTES
Dear Jakub,    Your tests were all normal. A copy of your tests are available in My Chart.    We can repeat these in a year.    Glad to see you at your appointment.  If you have any questions feel free to call.      Sincerely,      MARYA Granados.

## 2017-09-05 DIAGNOSIS — E78.5 HYPERLIPIDEMIA LDL GOAL <70: ICD-10-CM

## 2017-09-05 DIAGNOSIS — E11.21 TYPE 2 DIABETES MELLITUS WITH DIABETIC NEPHROPATHY (H): ICD-10-CM

## 2017-09-05 DIAGNOSIS — I10 ESSENTIAL HYPERTENSION WITH GOAL BLOOD PRESSURE LESS THAN 140/90: ICD-10-CM

## 2017-09-05 NOTE — TELEPHONE ENCOUNTER
Amlodipine      Last Written Prescription Date: 4/5/17  Last Fill Quantity: 90, # refills: 1    Last Office Visit with [facility], [facility] or  Health prescribing provider:  8/23/17   Future Office Visit:        BP Readings from Last 3 Encounters:   08/23/17 110/70   10/03/16 124/70   08/22/16 120/70     Glimepiride         Last Written Prescription Date: 6/16/17  Last Fill Quantity: 90, # refills: 0  Last Office Visit with [facility], [facility] or  Health prescribing provider:  8/23/17        BP Readings from Last 3 Encounters:   08/23/17 110/70   10/03/16 124/70   08/22/16 120/70     Lab Results   Component Value Date    MICROL <5 08/18/2017     Lab Results   Component Value Date    UMALCR Unable to calculate due to low value 08/18/2017     Creatinine   Date Value Ref Range Status   08/18/2017 0.71 0.66 - 1.25 mg/dL Final   ]  GFR Estimate   Date Value Ref Range Status   08/18/2017 >90 >60 mL/min/1.7m2 Final     Comment:     Non  GFR Calc   08/23/2016 84 >60 mL/min/1.7m2 Final   07/13/2016 >90 >60 mL/min/1.7m2 Final     GFR Estimate If Black   Date Value Ref Range Status   08/18/2017 >90 >60 mL/min/1.7m2 Final     Comment:      GFR Calc   08/23/2016 >90 >60 mL/min/1.7m2 Final   07/13/2016 >90 >60 mL/min/1.7m2 Final     Lab Results   Component Value Date    CHOL 102 08/18/2017     Lab Results   Component Value Date    HDL 84 08/18/2017     Lab Results   Component Value Date    LDL 13 08/18/2017     Lab Results   Component Value Date    TRIG 24 08/18/2017     Lab Results   Component Value Date    CHOLHDLRATIO 1.9 11/16/2015     Lab Results   Component Value Date    AST 21 08/18/2017     Lab Results   Component Value Date    ALT 24 08/18/2017     Lab Results   Component Value Date    A1C 6.3 08/18/2017    A1C 7.3 10/03/2016    A1C 7.0 07/13/2016    A1C 6.7 02/17/2016    A1C 8.2 11/16/2015     Potassium   Date Value Ref Range Status   08/18/2017 3.9 3.4 - 5.3 mmol/L Final     Atorvastatin     Last Written  Prescription Date: 6/21/17  Last Fill Quantity: 90, # refills: 0  [place] [facility] prescribing provider: 8/23/17       Lab Results   Component Value Date    CHOL 102 08/18/2017     Lab Results   Component Value Date    HDL 84 08/18/2017     Lab Results   Component Value Date    LDL 13 08/18/2017     Lab Results   Component Value Date    TRIG 24 08/18/2017     Lab Results   Component Value Date    CHOLHDLRATIO 1.9 11/16/2015     Losartan      Last Written Prescription Date: 6/26/17  Last Fill Quantity: 90, # refills: 0  Last [facility] Visit [place] [facility] or [facility] prescribing provider: 8/23/17       Potassium   Date Value Ref Range Status   08/18/2017 3.9 3.4 - 5.3 mmol/L Final     Creatinine   Date Value Ref Range Status   08/18/2017 0.71 0.66 - 1.25 mg/dL Final     BP Readings from Last 3 Encounters:   08/23/17 110/70   10/03/16 124/70   08/22/16 120/70

## 2017-09-07 RX ORDER — AMLODIPINE BESYLATE 5 MG/1
TABLET ORAL
Qty: 90 TABLET | Refills: 1 | Status: SHIPPED | OUTPATIENT
Start: 2017-09-07 | End: 2018-03-14

## 2017-09-07 RX ORDER — ATORVASTATIN CALCIUM 40 MG/1
TABLET, FILM COATED ORAL
Qty: 90 TABLET | Refills: 1 | Status: SHIPPED | OUTPATIENT
Start: 2017-09-07 | End: 2018-03-14

## 2017-09-07 RX ORDER — LOSARTAN POTASSIUM 100 MG/1
TABLET ORAL
Qty: 90 TABLET | Refills: 1 | Status: SHIPPED | OUTPATIENT
Start: 2017-09-07 | End: 2018-03-14

## 2017-09-07 RX ORDER — GLIMEPIRIDE 2 MG/1
TABLET ORAL
Qty: 90 TABLET | Refills: 1 | Status: SHIPPED | OUTPATIENT
Start: 2017-09-07 | End: 2018-03-14

## 2017-10-10 ENCOUNTER — TRANSFERRED RECORDS (OUTPATIENT)
Dept: HEALTH INFORMATION MANAGEMENT | Facility: CLINIC | Age: 67
End: 2017-10-10

## 2017-11-20 DIAGNOSIS — E11.40 TYPE 2 DIABETES MELLITUS WITH DIABETIC NEUROPATHY (H): ICD-10-CM

## 2017-11-21 RX ORDER — BLOOD SUGAR DIAGNOSTIC
STRIP MISCELLANEOUS
Qty: 200 STRIP | Refills: 1 | Status: SHIPPED | OUTPATIENT
Start: 2017-11-21 | End: 2018-10-19

## 2017-11-21 NOTE — TELEPHONE ENCOUNTER
8-23-17 OV  Requested Prescriptions   Pending Prescriptions Disp Refills     ACCU-CHEK RYAN PLUS test strip [Pharmacy Med Name: ACCU-CHEK RYAN PLUS STRIPS 100'S] 200 strip 0     Sig: USE TO TEST BLOOD SUGAR TWICE DAILY    Diabetic Supplies Protocol Passed    11/20/2017  8:21 PM       Passed - Patient is 18 years of age or older       Passed - Patient has had appt within past 6 mos    IV to PO - Antibiotics     None                  Prescription approved per [facility] Refill Protocol.

## 2017-11-29 ENCOUNTER — TELEPHONE (OUTPATIENT)
Dept: FAMILY MEDICINE | Facility: CLINIC | Age: 67
End: 2017-11-29

## 2017-11-29 NOTE — TELEPHONE ENCOUNTER
Reason for Call:  Form, our goal is to have forms completed with 72 hours, however, some forms may require a visit or additional information.    Type of letter, form or note:  medical    Who is the form from?: Home care    Where did the form come from: form was faxed in    What clinic location was the form placed at?: [facility]    Where the form was placed: 's Box: Gavin Sevilla MD    What number is listed as a contact on the form?: [phone] phone [phone]       Additional comments: [facility] diabetic form test strips and lancets     Call taken on 11/29/2017 at 4:17 PM by Rizwana Winston

## 2018-01-18 DIAGNOSIS — E11.21 TYPE 2 DIABETES MELLITUS WITH DIABETIC NEPHROPATHY, WITHOUT LONG-TERM CURRENT USE OF INSULIN (H): ICD-10-CM

## 2018-01-18 NOTE — TELEPHONE ENCOUNTER
"Requested Prescriptions   Pending Prescriptions Disp Refills     metFORMIN (GLUCOPHAGE) 500 MG tablet 90 tablet 0    There is no refill protocol information for this order      Last Written Prescription Date:  7/20/17  Last Fill Quantity: 90,  # refills: 0   Last Office Visit  8/23/2017        with  [facility], [facility] or [facility] prescribing provider:     Future Office Visit:      Passed - Patient's BP is less than 140/90    BP Readings from Last 3 Encounters:   08/23/17 110/70   10/03/16 124/70   08/22/16 120/70          Passed - Patient has documented LDL within the past 12 mos.    Recent Labs   Lab Test  08/18/17   0758   LDL  13          Passed - Patient has had a Microalbumin in the past 12 mos.    Recent Labs   Lab Test  08/18/17   0757   09/20/12   1334   [id number]   --    --   5.0   UP3120   --    --   24.5*   MICROL  <5   < >   --    UMALCR  Unable to calculate due to low value   < >   --     < > = values in this interval not displayed.          Passed - Patient is age 10 or older       Passed - Patient has documented A1c within the specified period of time.    Recent Labs   Lab Test  08/18/17   0758   A1C  6.3*          Passed - Patient's CR is NOT>1.4 OR Patient's EGFR is NOT<45 within past 12 mos.    Recent Labs   Lab Test  08/18/17   0758   GFRESTIMATED  >90   GFRESTBLACK  >90     Recent Labs   Lab Test  08/18/17   0758   CR  0.71          Passed - Patient does NOT have a diagnosis of CHF.       Passed - Recent (6 mos) or future visit with authorizing provider's specialty    Patient had office visit in the last 6 months or has a visit in the next 30 days with authorizing provider.  See \"Patient Info\" tab in inbasket, or \"Choose Columns\" in Meds & Orders section of the refill encounter.                "

## 2018-01-19 NOTE — TELEPHONE ENCOUNTER
Prescription approved per [facility] Refill Protocol.  lov 8/23/17  Needs labs related to medication.

## 2018-02-28 ENCOUNTER — TELEPHONE (OUTPATIENT)
Dept: FAMILY MEDICINE | Facility: CLINIC | Age: 68
End: 2018-02-28

## 2018-02-28 NOTE — TELEPHONE ENCOUNTER
Reason for Call:  Other call back    Detailed comments:    Patient needs orders for 6 mo diabetic check  Labs    Please call patient and let him know when they are in so he can schedule a lab appointment and OV     Phone Number Patient can be reached at: Cell number on file:    Telephone Information:   Mobile [phone]       Best Time: anytime    Can we leave a detailed message on this number? YES    Call taken on 2/28/2018 at 11:37 AM by USMAN PADGETT

## 2018-03-01 DIAGNOSIS — R80.9 MICROALBUMINURIA: Primary | ICD-10-CM

## 2018-03-01 DIAGNOSIS — E78.5 HYPERLIPIDEMIA LDL GOAL <100: ICD-10-CM

## 2018-03-01 DIAGNOSIS — I10 HYPERTENSION GOAL BP (BLOOD PRESSURE) < 140/90: ICD-10-CM

## 2018-03-01 DIAGNOSIS — E11.21 TYPE 2 DIABETES MELLITUS WITH DIABETIC NEPHROPATHY, WITHOUT LONG-TERM CURRENT USE OF INSULIN (H): ICD-10-CM

## 2018-03-01 NOTE — TELEPHONE ENCOUNTER
Called Jakub and left VM letting him know that he can schedule a lab only appt as Dr. Sevilla has placed lab orders

## 2018-03-09 DIAGNOSIS — E11.21 TYPE 2 DIABETES MELLITUS WITH DIABETIC NEPHROPATHY, WITHOUT LONG-TERM CURRENT USE OF INSULIN (H): ICD-10-CM

## 2018-03-09 DIAGNOSIS — E78.5 HYPERLIPIDEMIA LDL GOAL <100: ICD-10-CM

## 2018-03-09 DIAGNOSIS — R80.9 MICROALBUMINURIA: ICD-10-CM

## 2018-03-09 DIAGNOSIS — I10 HYPERTENSION GOAL BP (BLOOD PRESSURE) < 140/90: ICD-10-CM

## 2018-03-09 LAB
ALBUMIN SERPL-MCNC: 4.3 G/DL (ref 3.4–5)
ALP SERPL-CCNC: 57 U/L (ref 40–150)
ALT SERPL W P-5'-P-CCNC: 22 U/L (ref 0–70)
ANION GAP SERPL CALCULATED.3IONS-SCNC: 9 MMOL/L (ref 3–14)
AST SERPL W P-5'-P-CCNC: 24 U/L (ref 0–45)
BASOPHILS # BLD AUTO: 0 10E9/L (ref 0–0.2)
BASOPHILS NFR BLD AUTO: 0.4 %
BILIRUB SERPL-MCNC: 0.6 MG/DL (ref 0.2–1.3)
BUN SERPL-MCNC: 13 MG/DL (ref 7–30)
CALCIUM SERPL-MCNC: 8.4 MG/DL (ref 8.5–10.1)
CHLORIDE SERPL-SCNC: 96 MMOL/L (ref 94–109)
CHOLEST SERPL-MCNC: 112 MG/DL
CO2 SERPL-SCNC: 24 MMOL/L (ref 20–32)
CREAT SERPL-MCNC: 0.78 MG/DL (ref 0.66–1.25)
CREAT UR-MCNC: 14 MG/DL
DIFFERENTIAL METHOD BLD: ABNORMAL
EOSINOPHIL # BLD AUTO: 0.3 10E9/L (ref 0–0.7)
EOSINOPHIL NFR BLD AUTO: 3.8 %
ERYTHROCYTE [DISTWIDTH] IN BLOOD BY AUTOMATED COUNT: 13.6 % (ref 10–15)
GFR SERPL CREATININE-BSD FRML MDRD: >90 ML/MIN/1.7M2
GLUCOSE SERPL-MCNC: 155 MG/DL (ref 70–99)
HBA1C MFR BLD: 7.4 % (ref 4.3–6)
HCT VFR BLD AUTO: 37.9 % (ref 40–53)
HDLC SERPL-MCNC: 71 MG/DL
HGB BLD-MCNC: 13 G/DL (ref 13.3–17.7)
LDLC SERPL CALC-MCNC: 35 MG/DL
LYMPHOCYTES # BLD AUTO: 1.2 10E9/L (ref 0.8–5.3)
LYMPHOCYTES NFR BLD AUTO: 17.1 %
MCH RBC QN AUTO: 30.3 PG (ref 26.5–33)
MCHC RBC AUTO-ENTMCNC: 34.3 G/DL (ref 31.5–36.5)
MCV RBC AUTO: 88 FL (ref 78–100)
MICROALBUMIN UR-MCNC: <5 MG/L
MICROALBUMIN/CREAT UR: NORMAL MG/G CR (ref 0–17)
MONOCYTES # BLD AUTO: 1 10E9/L (ref 0–1.3)
MONOCYTES NFR BLD AUTO: 14.8 %
NEUTROPHILS # BLD AUTO: 4.4 10E9/L (ref 1.6–8.3)
NEUTROPHILS NFR BLD AUTO: 63.9 %
NONHDLC SERPL-MCNC: 41 MG/DL
PLATELET # BLD AUTO: 205 10E9/L (ref 150–450)
POTASSIUM SERPL-SCNC: 4 MMOL/L (ref 3.4–5.3)
PROT SERPL-MCNC: 7.7 G/DL (ref 6.8–8.8)
RBC # BLD AUTO: 4.29 10E12/L (ref 4.4–5.9)
SODIUM SERPL-SCNC: 129 MMOL/L (ref 133–144)
TRIGL SERPL-MCNC: 32 MG/DL
WBC # BLD AUTO: 6.8 10E9/L (ref 4–11)

## 2018-03-09 PROCEDURE — 85025 COMPLETE CBC W/AUTO DIFF WBC: CPT | Performed by: FAMILY MEDICINE

## 2018-03-09 PROCEDURE — 83036 HEMOGLOBIN GLYCOSYLATED A1C: CPT | Performed by: FAMILY MEDICINE

## 2018-03-09 PROCEDURE — 82043 UR ALBUMIN QUANTITATIVE: CPT | Performed by: FAMILY MEDICINE

## 2018-03-09 PROCEDURE — 80061 LIPID PANEL: CPT | Performed by: FAMILY MEDICINE

## 2018-03-09 PROCEDURE — 80053 COMPREHEN METABOLIC PANEL: CPT | Performed by: FAMILY MEDICINE

## 2018-03-09 PROCEDURE — 36415 COLL VENOUS BLD VENIPUNCTURE: CPT | Performed by: FAMILY MEDICINE

## 2018-03-14 ENCOUNTER — OFFICE VISIT (OUTPATIENT)
Dept: FAMILY MEDICINE | Facility: CLINIC | Age: 68
End: 2018-03-14
Payer: MEDICARE

## 2018-03-14 VITALS
SYSTOLIC BLOOD PRESSURE: 116 MMHG | RESPIRATION RATE: 16 BRPM | HEART RATE: 60 BPM | BODY MASS INDEX: 29.62 KG/M2 | HEIGHT: 69 IN | DIASTOLIC BLOOD PRESSURE: 70 MMHG | TEMPERATURE: 97 F | WEIGHT: 200 LBS

## 2018-03-14 DIAGNOSIS — E78.5 HYPERLIPIDEMIA LDL GOAL <70: ICD-10-CM

## 2018-03-14 DIAGNOSIS — I10 HYPERTENSION GOAL BP (BLOOD PRESSURE) < 140/90: Chronic | ICD-10-CM

## 2018-03-14 DIAGNOSIS — I25.10 CORONARY ARTERY DISEASE INVOLVING NATIVE CORONARY ARTERY OF NATIVE HEART WITHOUT ANGINA PECTORIS: Chronic | ICD-10-CM

## 2018-03-14 DIAGNOSIS — I10 ESSENTIAL HYPERTENSION WITH GOAL BLOOD PRESSURE LESS THAN 140/90: ICD-10-CM

## 2018-03-14 DIAGNOSIS — E11.21 TYPE 2 DIABETES MELLITUS WITH DIABETIC NEPHROPATHY, WITHOUT LONG-TERM CURRENT USE OF INSULIN (H): Primary | Chronic | ICD-10-CM

## 2018-03-14 DIAGNOSIS — E78.5 HYPERLIPIDEMIA LDL GOAL <100: Chronic | ICD-10-CM

## 2018-03-14 PROCEDURE — 99214 OFFICE O/P EST MOD 30 MIN: CPT | Performed by: FAMILY MEDICINE

## 2018-03-14 RX ORDER — ATORVASTATIN CALCIUM 40 MG/1
TABLET, FILM COATED ORAL
Qty: 90 TABLET | Refills: 1 | Status: SHIPPED | OUTPATIENT
Start: 2018-03-14 | End: 2018-10-26

## 2018-03-14 RX ORDER — LOSARTAN POTASSIUM 100 MG/1
TABLET ORAL
Qty: 90 TABLET | Refills: 1 | Status: SHIPPED | OUTPATIENT
Start: 2018-03-14 | End: 2018-10-26

## 2018-03-14 RX ORDER — AMLODIPINE BESYLATE 5 MG/1
TABLET ORAL
Qty: 90 TABLET | Refills: 1 | Status: SHIPPED | OUTPATIENT
Start: 2018-03-14 | End: 2018-10-26

## 2018-03-14 RX ORDER — GLIMEPIRIDE 2 MG/1
TABLET ORAL
Qty: 90 TABLET | Refills: 1 | Status: SHIPPED | OUTPATIENT
Start: 2018-03-14 | End: 2018-10-26

## 2018-03-14 ASSESSMENT — ANXIETY QUESTIONNAIRES
3. WORRYING TOO MUCH ABOUT DIFFERENT THINGS: NOT AT ALL
1. FEELING NERVOUS, ANXIOUS, OR ON EDGE: NOT AT ALL
GAD7 TOTAL SCORE: 0
7. FEELING AFRAID AS IF SOMETHING AWFUL MIGHT HAPPEN: NOT AT ALL
2. NOT BEING ABLE TO STOP OR CONTROL WORRYING: NOT AT ALL
4. TROUBLE RELAXING: NOT AT ALL
6. BECOMING EASILY ANNOYED OR IRRITABLE: NOT AT ALL
7. FEELING AFRAID AS IF SOMETHING AWFUL MIGHT HAPPEN: NOT AT ALL
5. BEING SO RESTLESS THAT IT IS HARD TO SIT STILL: NOT AT ALL

## 2018-03-14 ASSESSMENT — PATIENT HEALTH QUESTIONNAIRE - PHQ9
10. IF YOU CHECKED OFF ANY PROBLEMS, HOW DIFFICULT HAVE THESE PROBLEMS MADE IT FOR YOU TO DO YOUR WORK, TAKE CARE OF THINGS AT HOME, OR GET ALONG WITH OTHER PEOPLE: NOT DIFFICULT AT ALL
SUM OF ALL RESPONSES TO PHQ QUESTIONS 1-9: 0
SUM OF ALL RESPONSES TO PHQ QUESTIONS 1-9: 0

## 2018-03-14 NOTE — PROGRESS NOTES
SUBJECTIVE:   Jakub Robles is a 67 year old male who presents to clinic today for the following health issues:      Diabetes Follow-up    Patient is checking blood sugars: twice daily.    Blood sugar testing frequency justification: Risk of hypoglycemia with medication(s)  Results are as follows:         am - 135-140         suppertime - around 100    Diabetic concerns: None     Symptoms of hypoglycemia (low blood sugar): none     Paresthesias (numbness or burning in feet) or sores: No     Date of last diabetic eye exam: UTD    Hyperlipidemia Follow-Up      Rate your low fat/cholesterol diet?: good    Taking statin?  Yes, no muscle aches from statin    Other lipid medications/supplements?:  none    Hypertension Follow-up      Outpatient blood pressures are not being checked.    Low Salt Diet: no added salt    BP Readings from Last 2 Encounters:   03/14/18 116/70   08/23/17 110/70     Hemoglobin A1C (%)   Date Value   03/09/2018 7.4 (H)   08/18/2017 6.3 (H)     LDL Cholesterol Calculated (mg/dL)   Date Value   03/09/2018 35   08/18/2017 13       Amount of exercise or physical activity: 4-5 days/week for an average of 15-30 minutes    Problems taking medications regularly: No    Medication side effects: none    Diet: low salt and low fat/cholesterol            Problem list and histories reviewed & adjusted, as indicated.  Additional history: as documented    Patient Active Problem List   Diagnosis     Agatston coronary artery calcium score greater than 400 in 5-13 Neg treadmill stress test in 2008      Type 2 diabetes mellitus with diabetic nephropathy (HCC)     Hypertension goal BP (blood pressure) < 140/90     Hyperlipidemia LDL goal <100     History of colonic polyps     Tobacco abuse: [age]y/o@ 1-2ppd=70 pk yr hx      Need for prophylactic vaccination against Streptococcus pneumoniae (pneumococcus)-ribs hurt too much      Microalbuminuria     Coronary artery disease involving native coronary artery of  "native heart without angina pectoris     Past Surgical History:   Procedure Laterality Date     CARDIAC SURGERY  2016    stents x 2     HEAD & NECK SURGERY      dental implants     implant[      dental     SINUS SURGERY  1998       Social History   Substance Use Topics     Smoking status: Current Some Day Smoker     Types: Cigars     Smokeless tobacco: Never Used      Comment: Now smoking an Ecig     Alcohol use Yes      Comment: couple glasses wine/day     Family History   Problem Relation Age of Onset     Arthritis Mother      CANCER Mother 83     pancreatic cancer     Circulatory Father      AAA     CANCER Brother      unknown primary           Reviewed and updated as needed this visit by clinical staff  Tobacco  Allergies  Meds  Med Hx  Surg Hx  Fam Hx  Soc Hx      Reviewed and updated as needed this visit by Provider         ROS:  Constitutional, HEENT, cardiovascular, pulmonary, gi and gu systems are negative, except as otherwise noted.    OBJECTIVE:                                                    /70 (Cuff Size: Adult Regular)  Pulse 60  Temp 97  F (36.1  C) (Tympanic)  Resp 16  Ht 5' 8.5\" (1.74 m)  Wt 200 lb (90.7 kg)  BMI 29.97 kg/m2  Body mass index is 29.97 kg/(m^2).  GENERAL APPEARANCE: healthy, alert and no distress  RESP: lungs clear to auscultation - no rales, rhonchi or wheezes  CV: regular rates and rhythm, normal S1 S2, no S3 or S4 and no murmur, click or rub    Diagnostic test results:  Results for orders placed or performed in visit on 03/09/18   CBC with platelets differential   Result Value Ref Range    WBC 6.8 4.0 - 11.0 10e9/L    RBC Count 4.29 (L) 4.4 - 5.9 10e12/L    Hemoglobin 13.0 (L) 13.3 - 17.7 g/dL    Hematocrit 37.9 (L) 40.0 - 53.0 %    MCV 88 78 - 100 fl    MCH 30.3 26.5 - 33.0 pg    MCHC 34.3 31.5 - 36.5 g/dL    RDW 13.6 10.0 - 15.0 %    Platelet Count 205 150 - 450 10e9/L    Diff Method Automated Method     % Neutrophils 63.9 %    % Lymphocytes 17.1 %    % " Monocytes 14.8 %    % Eosinophils 3.8 %    % Basophils 0.4 %    Absolute Neutrophil 4.4 1.6 - 8.3 10e9/L    Absolute Lymphocytes 1.2 0.8 - 5.3 10e9/L    Absolute Monocytes 1.0 0.0 - 1.3 10e9/L    Absolute Eosinophils 0.3 0.0 - 0.7 10e9/L    Absolute Basophils 0.0 0.0 - 0.2 10e9/L   Comprehensive metabolic panel   Result Value Ref Range    Sodium 129 (L) 133 - 144 mmol/L    Potassium 4.0 3.4 - 5.3 mmol/L    Chloride 96 94 - 109 mmol/L    Carbon Dioxide 24 20 - 32 mmol/L    Anion Gap 9 3 - 14 mmol/L    Glucose 155 (H) 70 - 99 mg/dL    Urea Nitrogen 13 7 - 30 mg/dL    Creatinine 0.78 0.66 - 1.25 mg/dL    GFR Estimate >90 >60 mL/min/1.7m2    GFR Estimate If Black >90 >60 mL/min/1.7m2    Calcium 8.4 (L) 8.5 - 10.1 mg/dL    Bilirubin Total 0.6 0.2 - 1.3 mg/dL    Albumin 4.3 3.4 - 5.0 g/dL    Protein Total 7.7 6.8 - 8.8 g/dL    Alkaline Phosphatase 57 40 - 150 U/L    ALT 22 0 - 70 U/L    AST 24 0 - 45 U/L   Lipid Profile   Result Value Ref Range    Cholesterol 112 <200 mg/dL    Triglycerides 32 <150 mg/dL    HDL Cholesterol 71 >39 mg/dL    LDL Cholesterol Calculated 35 <100 mg/dL    Non HDL Cholesterol 41 <130 mg/dL   Albumin Random Urine Quantitative with Creat Ratio   Result Value Ref Range    Creatinine Urine 14 mg/dL    Albumin Urine mg/L <5 mg/L    Albumin Urine mg/g Cr Unable to calculate due to low value 0 - 17 mg/g Cr   Hemoglobin A1c   Result Value Ref Range    Hemoglobin A1C 7.4 (H) 4.3 - 6.0 %        ASSESSMENT/PLAN:                                                        ICD-10-CM    1. Type 2 diabetes mellitus with diabetic nephropathy, without long-term current use of insulin (H) E11.21 metFORMIN (GLUCOPHAGE) 500 MG tablet     glimepiride (AMARYL) 2 MG tablet   2. Coronary artery disease involving native coronary artery of native heart without angina pectoris I25.10    3. Hypertension goal BP (blood pressure) < 140/90 I10    4. Hyperlipidemia LDL goal <100 E78.5    5. Essential hypertension with goal blood  pressure less than 140/90 I10 amLODIPine (NORVASC) 5 MG tablet     losartan (COZAAR) 100 MG tablet   6. Hyperlipidemia LDL goal <70 E78.5 atorvastatin (LIPITOR) 40 MG tablet       Patient Instructions   Medication was refilled as noted. Will see in August for a physical.      Gavin Sevilla MD  [facility]

## 2018-03-14 NOTE — MR AVS SNAPSHOT
After Visit Summary   3/14/2018    Jakub Robles    MRN: [id number]           Patient Information     Date Of Birth          1950        Visit Information        Provider Department      3/14/2018 9:45 AM Gavin Sevilla MD [facility]        Today's Diagnoses     Type 2 diabetes mellitus with diabetic nephropathy, without long-term current use of insulin (H)    -  1    Coronary artery disease involving native coronary artery of native heart without angina pectoris        Hypertension goal BP (blood pressure) < 140/90        Hyperlipidemia LDL goal <100        Essential hypertension with goal blood pressure less than 140/90        Hyperlipidemia LDL goal <70          Care Instructions    Medication was refilled as noted. Will see in August for a physical.          Follow-ups after your visit        Who to contact     If you have questions or need follow up information about today's clinic visit or your schedule please contact [facility] directly at [phone].  Normal or non-critical lab and imaging results will be communicated to you by MyChart, letter or phone within 4 business days after the clinic has received the results. If you do not hear from us within 7 days, please contact the clinic through [employer] or phone. If you have a critical or abnormal lab result, we will notify you by phone as soon as possible.  Submit refill requests through [employer] or call your pharmacy and they will forward the refill request to us. Please allow 3 business days for your refill to be completed.          Additional Information About Your Visit        [employer] Information     [employer] gives you secure access to your electronic health record. If you see a primary care provider, you can also send messages to your care team and make appointments. If you have questions, please call your primary care clinic.  If you do not have a primary care  "provider, please call [phone] and they will assist you.        Care EveryWhere ID     This is your Care EveryWhere ID. This could be used by other organizations to access your [place] medical records  [id number]        Your Vitals Were     Pulse Temperature Respirations Height BMI (Body Mass Index)       60 97  F (36.1  C) (Tympanic) 16 5' 8.5\" (1.74 m) 29.97 kg/m2        Blood Pressure from Last 3 Encounters:   03/14/18 116/70   08/23/17 110/70   10/03/16 124/70    Weight from Last 3 Encounters:   03/14/18 200 lb (90.7 kg)   08/23/17 191 lb (86.6 kg)   10/03/16 189 lb (85.7 kg)              Today, you had the following     No orders found for display         Today's Medication Changes          These changes are accurate as of 3/14/18 10:10 AM.  If you have any questions, ask your nurse or doctor.               These medicines have changed or have updated prescriptions.        Dose/Directions    amLODIPine 5 MG tablet   Commonly known as:  NORVASC   This may have changed:  See the new instructions.   Used for:  Essential hypertension with goal blood pressure less than 140/90   Changed by:  Gavin Sevilla MD        TAKE 1 TABLET EVERY DAY   Quantity:  90 tablet   Refills:  1       atorvastatin 40 MG tablet   Commonly known as:  LIPITOR   This may have changed:  See the new instructions.   Used for:  Hyperlipidemia LDL goal <70   Changed by:  Gavin Sevilla MD        TAKE 1 TABLET EVERY DAY   Quantity:  90 tablet   Refills:  1       glimepiride 2 MG tablet   Commonly known as:  AMARYL   This may have changed:  See the new instructions.   Used for:  Type 2 diabetes mellitus with diabetic nephropathy, without long-term current use of insulin (H)   Changed by:  Gavin Sevilla MD        TAKE 1 TABLET EVERY MORNING BEFORE BREAKFAST (NEED 6 MONTH A1C)   Quantity:  90 tablet   Refills:  1       losartan 100 MG tablet   Commonly known as:  COZAAR   This may have changed:  See the new " instructions.   Used for:  Essential hypertension with goal blood pressure less than 140/90   Changed by:  Gavin Sevilla MD        TAKE 1 TABLET EVERY DAY   Quantity:  90 tablet   Refills:  1            Where to get your medicines      These medications were sent to [facility] - [place], [place] - [address]  [address], [facility] [place] [address]     Phone:  [phone]     amLODIPine 5 MG tablet    atorvastatin 40 MG tablet    glimepiride 2 MG tablet    losartan 100 MG tablet    metFORMIN 500 MG tablet                Primary Care Provider Office Phone # Fax #    Gavin Sevilla [phone] [address] [address]        Equal Access to Services     [facility] : Hadii devan ku hadasho Soomaali, waaxda luqadaha, qaybta kaalmada adeegyada, dilan torresin idalia marques . So [facility] [phone].    ATENCIÓN: Si habla español, tiene a de león disposición servicios gratuitos de asistencia lingüística. Ll[facility] [phone].    We comply with applicable federal civil rights laws and [place] laws. We do not discriminate on the basis of race, color, national origin, age, disability, sex, sexual orientation, or gender identity.            Thank you!     Thank you for choosing [facility]  for your care. Our goal is always to provide you with excellent care. Hearing back from our patients is one way we can continue to improve our services. Please take a few minutes to complete the written survey that you may receive in the mail after your visit with us. Thank you!             Your Updated Medication List - Protect others around you: Learn how to safely use, store and throw away your medicines at www.disposemymeds.org.          This list is accurate as of 3/14/18 10:10 AM.  Always use your most recent med list.                   Brand Name Dispense Instructions for use Diagnosis    ACCU-CHEK RYAN PLUS test  strip   Generic drug:  blood glucose monitoring     200 strip    USE TO TEST BLOOD SUGAR TWICE DAILY    Type 2 diabetes mellitus with diabetic neuropathy (H)       amLODIPine 5 MG tablet    NORVASC    90 tablet    TAKE 1 TABLET EVERY DAY    Essential hypertension with goal blood pressure less than 140/90       aspirin 81 MG tablet      Take 1 tablet by mouth daily        atorvastatin 40 MG tablet    LIPITOR    90 tablet    TAKE 1 TABLET EVERY DAY    Hyperlipidemia LDL goal <70       blood glucose monitoring lancets     2 Box    by In Vitro route 2 times daily    Type 2 diabetes mellitus with diabetic neuropathy (H)       clopidogrel 75 MG tablet    PLAVIX    90 tablet    Take 1 tablet (75 mg) by mouth daily    Coronary artery disease involving native coronary artery of native heart without angina pectoris       glimepiride 2 MG tablet    AMARYL    90 tablet    TAKE 1 TABLET EVERY MORNING BEFORE BREAKFAST (NEED 6 MONTH A1C)    Type 2 diabetes mellitus with diabetic nephropathy, without long-term current use of insulin (H)       losartan 100 MG tablet    COZAAR    90 tablet    TAKE 1 TABLET EVERY DAY    Essential hypertension with goal blood pressure less than 140/90       metFORMIN 500 MG tablet    GLUCOPHAGE    90 tablet    TAKE 1 TABLET EVERY DAY WITH DINNER    Type 2 diabetes mellitus with diabetic nephropathy, without long-term current use of insulin (H)       NITROGLYCERIN SL      Place 0.4 mg under the tongue as needed for chest pain        omega 3 1000 MG Caps     90 capsule    Take 1 g by mouth daily    Routine medical exam       * order for DME     200 each    2 times daily as needed Test strips for pt's glucometer, brand as covered by insurance. Test daily and prn.    Type II or unspecified type diabetes mellitus without mention of complication, not stated as uncontrolled       * order for DME     1 each    Equipment being ordered: rib belt    Contusion of rib, left, initial encounter       * order for DME      1 each    Glucometer, brand as covered by insurance.    Type 2 diabetes, HbA1C goal < 8% (H)       * order for DME     1 Device    Equipment being ordered: Anay Accu-chek monitor- dx 250.00- checks BID but has strips    Type II or unspecified type diabetes mellitus without mention of complication, not stated as uncontrolled       * order for DME     100 each    Lancets for Anay glucometer,pt tests twice daily.    Type II or unspecified type diabetes mellitus without mention of complication, not stated as uncontrolled       * Notice:  This list has 5 medication(s) that are the same as other medications prescribed for you. Read the directions carefully, and ask your doctor or other care provider to review them with you.

## 2018-03-14 NOTE — NURSING NOTE
"Chief Complaint   Patient presents with     Diabetes     Lipids     Hypertension       Initial /70 (Cuff Size: Adult Regular)  Pulse 60  Temp 97  F (36.1  C) (Tympanic)  Resp 16  Ht 5' 8.5\" (1.74 m)  Wt 200 lb (90.7 kg)  BMI 29.97 kg/m2 Estimated body mass index is 29.97 kg/(m^2) as calculated from the following:    Height as of this encounter: 5' 8.5\" (1.74 m).    Weight as of this encounter: 200 lb (90.7 kg).  Medication Reconciliation: complete     Kalyn Olivares CMA      "

## 2018-03-15 ASSESSMENT — ANXIETY QUESTIONNAIRES: GAD7 TOTAL SCORE: 0

## 2018-03-15 ASSESSMENT — PATIENT HEALTH QUESTIONNAIRE - PHQ9: SUM OF ALL RESPONSES TO PHQ QUESTIONS 1-9: 0

## 2018-06-08 ENCOUNTER — TELEPHONE (OUTPATIENT)
Dept: FAMILY MEDICINE | Facility: CLINIC | Age: 68
End: 2018-06-08

## 2018-06-08 NOTE — TELEPHONE ENCOUNTER
Reason for Call:  Form, our goal is to have forms completed with 72 hours, however, some forms may require a visit or additional information.    Type of letter, form or note:  medical    Who is the form from?: Home care    Where did the form come from: form was faxed in    What clinic location was the form placed at?: [facility]    Where the form was placed: 's Box: Gavin Sevilla MD    What number is listed as a contact on the form?: [phone]  Phone  [phone]       Additional comments: [facility] diabetic forms  test strips, lancets     Call taken on 6/8/2018 at 9:27 AM by Rizwana Winston

## 2018-06-13 ENCOUNTER — TELEPHONE (OUTPATIENT)
Dept: FAMILY MEDICINE | Facility: CLINIC | Age: 68
End: 2018-06-13

## 2018-06-13 NOTE — TELEPHONE ENCOUNTER
Reason for Call:  Other call back    Detailed comments: Rosaura faxed in form for test strips. Form was completed and faxed yesterday. Fax was not received by Rosaura. Please refax the from to [phone]    Phone Number Patient can be reached at: Home number on file [phone] (home)    Best Time: any    Can we leave a detailed message on this number? Not Applicable    Call taken on 6/13/2018 at 11:33 AM by Danya Saucedo

## 2018-07-22 DIAGNOSIS — E11.21 TYPE 2 DIABETES MELLITUS WITH DIABETIC NEPHROPATHY, WITHOUT LONG-TERM CURRENT USE OF INSULIN (H): Chronic | ICD-10-CM

## 2018-07-23 NOTE — TELEPHONE ENCOUNTER
Requested Prescriptions   Pending Prescriptions Disp Refills     metFORMIN (GLUCOPHAGE) 500 MG tablet [Pharmacy Med Name: METFORMIN  MG Tablet]  Last Written Prescription Date:  3/14/2018  Last Fill Quantity: 90 tablet,  # refills: 0   Last Office Visit  3/14/2018        with  [facility], [facility] or [facility] prescribing provider:     Future Office Visit:    Next 5 appointments (look out 90 days)     Aug 29, 2018  9:30 AM CDT   PHYSICAL with Gavin Sevilla MD   [facility]    [address] [place] [id number]   [phone]                90 tablet 0     Sig: TAKE 1 TABLET EVERY DAY WITH DINNER    Biguanide Agents Passed    7/22/2018  1:08 PM       Passed - Blood pressure less than 140/90 in past 6 months    BP Readings from Last 3 Encounters:   03/14/18 116/70   08/23/17 110/70   10/03/16 124/70          Passed - Patient has documented LDL within the past 12 mos.    Recent Labs   Lab Test  03/09/18 0816   LDL  35          Passed - Patient has had a Microalbumin in the past 12 mos.    Recent Labs   Lab Test  03/09/18 0815   09/20/12   1334   [id number]   --    --   5.0   [id number]   --    --   24.5*   MICROL  <5   < >   --    UMALCR  Unable to calculate due to low value   < >   --     < > = values in this interval not displayed.          Passed - Patient is age 10 or older       Passed - Patient has documented A1c within the specified period of time.    If HgbA1C is 8 or greater, it needs to be on file within the past 3 months.  If less than 8, must be on file within the past 6 months.     Recent Labs   Lab Test  03/09/18 0816   A1C  7.4*            Passed - Patient's CR is NOT>1.4 OR Patient's EGFR is NOT<45 within past 12 mos.    Recent Labs   Lab Test  03/09/18 0816   GFRESTIMATED  >90   GFRESTBLACK  >90       Recent Labs   Lab Test  03/09/18 0816   CR  0.78            Passed - Patient does NOT have a  "diagnosis of CHF.       Passed - Recent (6 mo) or future (30 days) visit within the authorizing provider's specialty    Patient had office visit in the last 6 months or has a visit in the next 30 days with authorizing provider or within the authorizing provider's specialty.  See \"Patient Info\" tab in inbasket, or \"Choose Columns\" in Meds & Orders section of the refill encounter.              "

## 2018-07-24 NOTE — TELEPHONE ENCOUNTER
Prescription approved per [facility] Refill Protocol.  Has physical scheduled for August 29th

## 2018-08-24 ENCOUNTER — TELEPHONE (OUTPATIENT)
Dept: FAMILY MEDICINE | Facility: CLINIC | Age: 68
End: 2018-08-24

## 2018-08-24 DIAGNOSIS — E11.21 TYPE 2 DIABETES MELLITUS WITH DIABETIC NEPHROPATHY, WITHOUT LONG-TERM CURRENT USE OF INSULIN (H): Primary | ICD-10-CM

## 2018-08-24 NOTE — TELEPHONE ENCOUNTER
Reason for Call:  Other call back    Detailed comments: Jakub has a physical on 8/29/18 with Dr. Sevilla.  Jakub is wondering about having lab work done in advance of the appointment, but there is no order in the chart.  Please call Jakub and advise.    Phone Number Patient can be reached at: Cell number on file:    Telephone Information:   Mobile [phone]       Best Time: any    Can we leave a detailed message on this number? YES    Call taken on 8/24/2018 at 12:50 PM by Liberty Luna

## 2018-08-24 NOTE — TELEPHONE ENCOUNTER
Pt is requesting lab orders prior to his physical appointment 08/29/2018. Per chart review, pt had CMP, lipid panel, CBC 03/2018. Future A1C order was pended. Please add any PRN labs. Pt will be fasting Monday morning. He is expecting a call when lab orders are placed.

## 2018-08-27 DIAGNOSIS — R80.9 MICROALBUMINURIA: ICD-10-CM

## 2018-08-27 DIAGNOSIS — E78.5 HYPERLIPIDEMIA LDL GOAL <100: ICD-10-CM

## 2018-08-27 DIAGNOSIS — Z12.5 SCREENING FOR PROSTATE CANCER: ICD-10-CM

## 2018-08-27 DIAGNOSIS — E11.21 TYPE 2 DIABETES MELLITUS WITH DIABETIC NEPHROPATHY, WITHOUT LONG-TERM CURRENT USE OF INSULIN (H): Primary | ICD-10-CM

## 2018-08-27 DIAGNOSIS — I10 HYPERTENSION GOAL BP (BLOOD PRESSURE) < 140/90: ICD-10-CM

## 2018-08-28 ASSESSMENT — ACTIVITIES OF DAILY LIVING (ADL)
CURRENT_FUNCTION: NO ASSISTANCE NEEDED
I_NEED_ASSISTANCE_FOR_THE_FOLLOWING_DAILY_ACTIVITIES:: NO ASSISTANCE IS NEEDED

## 2018-08-29 ENCOUNTER — OFFICE VISIT (OUTPATIENT)
Dept: FAMILY MEDICINE | Facility: CLINIC | Age: 68
End: 2018-08-29
Payer: MEDICARE

## 2018-08-29 VITALS
DIASTOLIC BLOOD PRESSURE: 70 MMHG | HEIGHT: 69 IN | HEART RATE: 61 BPM | RESPIRATION RATE: 14 BRPM | BODY MASS INDEX: 28.88 KG/M2 | SYSTOLIC BLOOD PRESSURE: 116 MMHG | OXYGEN SATURATION: 100 % | WEIGHT: 195 LBS | TEMPERATURE: 97.3 F

## 2018-08-29 DIAGNOSIS — R80.9 MICROALBUMINURIA: ICD-10-CM

## 2018-08-29 DIAGNOSIS — Z12.5 SCREENING FOR PROSTATE CANCER: ICD-10-CM

## 2018-08-29 DIAGNOSIS — E11.21 TYPE 2 DIABETES MELLITUS WITH DIABETIC NEPHROPATHY, WITHOUT LONG-TERM CURRENT USE OF INSULIN (H): ICD-10-CM

## 2018-08-29 DIAGNOSIS — Z13.89 SCREENING FOR DIABETIC PERIPHERAL NEUROPATHY: ICD-10-CM

## 2018-08-29 DIAGNOSIS — Z23 NEED FOR PROPHYLACTIC VACCINATION AGAINST STREPTOCOCCUS PNEUMONIAE (PNEUMOCOCCUS): ICD-10-CM

## 2018-08-29 DIAGNOSIS — Z00.00 ROUTINE MEDICAL EXAM: Primary | ICD-10-CM

## 2018-08-29 DIAGNOSIS — I10 HYPERTENSION GOAL BP (BLOOD PRESSURE) < 140/90: ICD-10-CM

## 2018-08-29 DIAGNOSIS — I25.10 CORONARY ARTERY DISEASE INVOLVING NATIVE CORONARY ARTERY OF NATIVE HEART WITHOUT ANGINA PECTORIS: ICD-10-CM

## 2018-08-29 DIAGNOSIS — E78.5 HYPERLIPIDEMIA LDL GOAL <100: ICD-10-CM

## 2018-08-29 PROCEDURE — 90732 PPSV23 VACC 2 YRS+ SUBQ/IM: CPT | Performed by: FAMILY MEDICINE

## 2018-08-29 PROCEDURE — G0439 PPPS, SUBSEQ VISIT: HCPCS | Performed by: FAMILY MEDICINE

## 2018-08-29 PROCEDURE — G0009 ADMIN PNEUMOCOCCAL VACCINE: HCPCS | Performed by: FAMILY MEDICINE

## 2018-08-29 ASSESSMENT — ACTIVITIES OF DAILY LIVING (ADL): CURRENT_FUNCTION: NO ASSISTANCE NEEDED

## 2018-08-29 NOTE — MR AVS SNAPSHOT
After Visit Summary   8/29/2018    Jakub Robles    MRN: [id number]           Patient Information     Date Of Birth          1950        Visit Information        Provider Department      8/29/2018 9:30 AM Gavin Sevilla MD [facility]        Today's Diagnoses     Routine medical exam    -  1    Screening for diabetic peripheral neuropathy        Need for prophylactic vaccination against Streptococcus pneumoniae (pneumococcus)        Screening for prostate cancer        Coronary artery disease involving native coronary artery of native heart without angina pectoris        Microalbuminuria        Type 2 diabetes mellitus with diabetic nephropathy, without long-term current use of insulin (H)        Hypertension goal BP (blood pressure) < 140/90        Hyperlipidemia LDL goal <100          Care Instructions      Preventive Health Recommendations:       Male Ages 65 and over    Yearly exam:             See your health care provider every year in order to  o   Review health changes.   o   Discuss preventive care.    o   Review your medicines if your doctor has prescribed any.    Talk with your health care provider about whether you should have a test to screen for prostate cancer (PSA).    Every 3 years, have a diabetes test (fasting glucose). If you are at risk for diabetes, you should have this test more often.    Every 5 years, have a cholesterol test. Have this test more often if you are at risk for high cholesterol or heart disease.     Every 10 years, have a colonoscopy. Or, have a yearly FIT test (stool test). These exams will check for colon cancer.    Talk to with your health care provider about screening for Abdominal Aortic Aneurysm if you have a family history of AAA or have a history of smoking.  Shots:     Get a flu shot each year.     Get a tetanus shot every 10 years.     Talk to your doctor about your pneumonia vaccines. There are now two you  should receive - Pneumovax (PPSV 23) and Prevnar (PCV 13).    Talk to your pharmacist about a shingles vaccine.     Talk to your doctor about the hepatitis B vaccine.  Nutrition:     Eat at least 5 servings of fruits and vegetables each day.     Eat whole-grain bread, whole-wheat pasta and brown rice instead of white grains and rice.     Get adequate Calcium and Vitamin D.   Lifestyle    Exercise for at least 150 minutes a week (30 minutes a day, 5 days a week). This will help you control your weight and prevent disease.     Limit alcohol to one drink per day.     No smoking.     Wear sunscreen to prevent skin cancer.     See your dentist every six months for an exam and cleaning.     See your eye doctor every 1 to 2 years to screen for conditions such as glaucoma, macular degeneration and cataracts.          Follow-ups after your visit        Follow-up notes from your care team     Return in about 6 months (around 2/28/2019) for Lab Work, Routine Visit.      Future tests that were ordered for you today     Open Future Orders        Priority Expected Expires Ordered    FOOT EXAM  NO CHARGE [56338.114] Routine  9/29/2018 8/29/2018    HEMOGLOBIN A1C Routine  9/29/2018 8/29/2018    TSH WITH FREE T4 REFLEX Routine  9/29/2018 8/29/2018    PNEUMOCOCCAL VACCINE,ADULT,SQ OR IM Routine  9/29/2018 8/29/2018    ADMIN 1st VACCINE Routine  9/29/2018 8/29/2018    UA with Microscopic Routine  9/29/2018 8/29/2018    CBC with platelets differential Routine  9/29/2018 8/29/2018    Comprehensive metabolic panel Routine  9/29/2018 8/29/2018    Lipid Profile Routine  9/29/2018 8/29/2018    Albumin Random Urine Quantitative with Creat Ratio Routine  9/29/2018 8/29/2018            Who to contact     If you have questions or need follow up information about today's clinic visit or your schedule please contact [facility] directly at [phone].  Normal or non-critical lab and imaging results will be  "communicated to you by [employer], letter or phone within 4 business days after the clinic has received the results. If you do not hear from us within 7 days, please contact the clinic through [employer] or phone. If you have a critical or abnormal lab result, we will notify you by phone as soon as possible.  Submit refill requests through [employer] or call your pharmacy and they will forward the refill request to us. Please allow 3 business days for your refill to be completed.          Additional Information About Your Visit        [employer] Information     [employer] gives you secure access to your electronic health record. If you see a primary care provider, you can also send messages to your care team and make appointments. If you have questions, please call your primary care clinic.  If you do not have a primary care provider, please call [phone] and they will assist you.        Care EveryWhere ID     This is your Care EveryWhere ID. This could be used by other organizations to access your [facility] medical records  [id number]        Your Vitals Were     Pulse Temperature Respirations Height Pulse Oximetry BMI (Body Mass Index)    61 97.3  F (36.3  C) (Tympanic) 14 5' 8.5\" (1.74 m) 100% 29.22 kg/m2       Blood Pressure from Last 3 Encounters:   08/29/18 116/70   03/14/18 116/70   08/23/17 110/70    Weight from Last 3 Encounters:   08/29/18 195 lb (88.5 kg)   03/14/18 200 lb (90.7 kg)   08/23/17 191 lb (86.6 kg)                 Today's Medication Changes          These changes are accurate as of 8/29/18 10:11 AM.  If you have any questions, ask your nurse or doctor.               Stop taking these medicines if you haven't already. Please contact your care team if you have questions.     clopidogrel 75 MG tablet   Commonly known as:  PLAVIX   Stopped by:  Gavin Sevilla MD                    Primary Care Provider Office Phone # Fax #    Gavin Sevilla [phone] [address]  [facility] [address]        Equal Access to Services     ARIANE MERA : Hadii aad ku hadvincentcollette Socoreyali, waaxda luqadaha, qaybta kaalmadilan cheema. So [facility] [phone].    ATENCIÓN: Si habla español, tiene a de león disposición servicios gratuitos de asistencia lingüística. wOename al [phone].    We comply with applicable federal civil rights laws and [place] laws. We do not discriminate on the basis of race, color, national origin, age, disability, sex, sexual orientation, or gender identity.            Thank you!     Thank you for choosing [facility]  for your care. Our goal is always to provide you with excellent care. Hearing back from our patients is one way we can continue to improve our services. Please take a few minutes to complete the written survey that you may receive in the mail after your visit with us. Thank you!             Your Updated Medication List - Protect others around you: Learn how to safely use, store and throw away your medicines at www.disposemymeds.org.          This list is accurate as of 8/29/18 10:11 AM.  Always use your most recent med list.                   Brand Name Dispense Instructions for use Diagnosis    ACCU-CHEK RYAN PLUS test strip   Generic drug:  blood glucose monitoring     200 strip    USE TO TEST BLOOD SUGAR TWICE DAILY    Type 2 diabetes mellitus with diabetic neuropathy (H)       amLODIPine 5 MG tablet    NORVASC    90 tablet    TAKE 1 TABLET EVERY DAY    Essential hypertension with goal blood pressure less than 140/90       aspirin 81 MG tablet      Take 1 tablet by mouth daily        atorvastatin 40 MG tablet    LIPITOR    90 tablet    TAKE 1 TABLET EVERY DAY    Hyperlipidemia LDL goal <70       blood glucose monitoring lancets     2 Box    by In Vitro route 2 times daily    Type 2 diabetes mellitus with diabetic neuropathy (H)       glimepiride 2 MG tablet    AMARYL    90 tablet    TAKE  1 TABLET EVERY MORNING BEFORE BREAKFAST (NEED 6 MONTH A1C)    Type 2 diabetes mellitus with diabetic nephropathy, without long-term current use of insulin (H)       losartan 100 MG tablet    COZAAR    90 tablet    TAKE 1 TABLET EVERY DAY    Essential hypertension with goal blood pressure less than 140/90       metFORMIN 500 MG tablet    GLUCOPHAGE    90 tablet    TAKE 1 TABLET EVERY DAY WITH DINNER    Type 2 diabetes mellitus with diabetic nephropathy, without long-term current use of insulin (H)       NITROGLYCERIN SL      Place 0.4 mg under the tongue as needed for chest pain        omega 3 1000 MG Caps     90 capsule    Take 1 g by mouth daily    Routine medical exam       order for DME     200 each    2 times daily as needed Test strips for pt's glucometer, brand as covered by insurance. Test daily and prn.    Type II or unspecified type diabetes mellitus without mention of complication, not stated as uncontrolled       order for DME     1 Device    Equipment being ordered: Anay Accu-chek monitor- dx 250.00- checks BID but has strips    Type II or unspecified type diabetes mellitus without mention of complication, not stated as uncontrolled       order for DME     100 each    Lancets for Anay glucometer,pt tests twice daily.    Type II or unspecified type diabetes mellitus without mention of complication, not stated as uncontrolled

## 2018-08-29 NOTE — NURSING NOTE
Prior to injection verified patient identity using patient's name and date of birth.  Due to injection administration, patient instructed to remain in clinic for 15 minutes  afterwards, and to report any adverse reaction to me immediately.  Screening Questionnaire for Adult Immunization    Are you sick today?   No   Do you have allergies to medications, food, a vaccine component or latex?   No   Have you ever had a serious reaction after receiving a vaccination?   No   Do you have a long-term health problem with heart disease, lung disease, asthma, kidney disease, metabolic disease (e.g. diabetes), anemia, or other blood disorder?   No   Do you have cancer, leukemia, HIV/AIDS, or any other immune system problem?   No   In the past 3 months, have you taken medications that affect  your immune system, such as prednisone, other steroids, or anticancer drugs; drugs for the treatment of rheumatoid arthritis, Crohn s disease, or psoriasis; or have you had radiation treatments?   No   Have you had a seizure, or a brain or other nervous system problem?   No   During the past year, have you received a transfusion of blood or blood     products, or been given immune (gamma) globulin or antiviral drug?   No   For women: Are you pregnant or is there a chance you could become        pregnant during the next month?   No   Have you received any vaccinations in the past 4 weeks?   No     Immunization questionnaire answers were all negative.        Per orders of Dr. Sevilla, injection of Pneumovax given by Loli Nunez. Patient instructed to remain in clinic for 15 minutes afterwards, and to report any adverse reaction to me immediately.       Screening performed by Loli Nunez on 8/29/2018 at 10:19 AM.

## 2018-08-29 NOTE — PROGRESS NOTES
SUBJECTIVE:   Jakub Robles is a 68 year old male who presents for Preventive Visit.      Are you in the first 12 months of your Medicare coverage?  No    Physical   Annual:     Getting at least 3 servings of Calcium per day:  Yes    Bi-annual eye exam:  Yes    Dental care twice a year:  Yes    Sleep apnea or symptoms of sleep apnea:  None    Diet:  Low fat/cholesterol    Frequency of exercise:  4-5 days/week    Duration of exercise:  Greater than 60 minutes    Taking medications regularly:  Yes    Medication side effects:  None    Additional concerns today:  No    Ability to successfully perform activities of daily living: no assistance needed    Home Safety:  Throw rugs in the hallway    Hearing Impairment: no hearing concerns        Fall risk:  Fallen 2 or more times in the past year?: No  Any fall with injury in the past year?: No    COGNITIVE SCREEN  1) Repeat 3 items (Leader, Season, Table)    2) Clock draw: NORMAL  3) 3 item recall: Recalls 2 objects   Results: NORMAL clock, 1-2 items recalled: COGNITIVE IMPAIRMENT LESS LIKELY    Mini-CogTM Copyright PAUL Rivas. Licensed by the author for use in [facility]; reprinted with permission (boni@[facility]). All rights reserved.        Reviewed and updated as needed this visit by clinical staff  Tobacco  Allergies  Meds  Med Hx  Surg Hx  Fam Hx  Soc Hx        Reviewed and updated as needed this visit by Provider        Social History   Substance Use Topics     Smoking status: Current Some Day Smoker     Types: Cigars     Smokeless tobacco: Never Used      Comment: Now smoking an Ecig     Alcohol use Yes      Comment: couple glasses wine/day       Alcohol Use 8/28/2018   If you drink alcohol do you typically have greater than 3 drinks per day OR greater than 7 drinks per week? Yes   AUDIT SCORE  4     AUDIT - Alcohol Use Disorders Identification Test - Reproduced from the World Health Organization Audit 2001 (Second Edition) 8/28/2018   1.  How  often do you have a drink containing alcohol? 4 or more times a week   2.  How many drinks containing alcohol do you have on a typical day when you are drinking? 1 or 2   3.  How often do you have five or more drinks on one occasion? Never   4.  How often during the last year have you found that you were not able to stop drinking once you had started? Never   5.  How often during the last year have you failed to do what was normally expected of you because of drinking? Never   6.  How often during the last year have you needed a first drink in the morning to get yourself going after a heavy drinking session? Never   7.  How often during the last year have you had a feeling of guilt or remorse after drinking? Never   8.  How often during the last year have you been unable to remember what happened the night before because of your drinking? Never   9.  Have you or someone else been injured because of your drinking? No   10. Has a relative, friend, doctor or other health care worker been concerned about your drinking or suggested you cut down? No   TOTAL SCORE 4               Today's PHQ-2 Score:   PHQ-2 ( 1999 Pfizer) 8/28/2018   Q1: Little interest or pleasure in doing things 0   Q2: Feeling down, depressed or hopeless 0   PHQ-2 Score 0   Q1: Little interest or pleasure in doing things Not at all   Q2: Feeling down, depressed or hopeless Not at all   PHQ-2 Score 0       Do you feel safe in your environment - Yes    Do you have a Health Care Directive?: Yes: Patient states has Advance Directive and will bring in a copy to clinic.    Current providers sharing in care for this patient include:   Patient Care Team:  Gavin Sevilla MD as PCP - General    The following health maintenance items are reviewed in Epic and correct as of today:  Health Maintenance   Topic Date Due     AORTIC ANEURYSM SCREENING (SYSTEM ASSIGNED)  07/17/2016     TSH W/ FREE T4 REFLEX Q2 YEAR  02/17/2018     EYE EXAM Q1 YEAR  06/27/2018      FOOT EXAM Q1 YEAR  08/23/2018     FALL RISK ASSESSMENT  08/23/2018     PNEUMOCOCCAL (2 of 2 - PPSV23) 08/23/2018     A1C Q6 MO  09/09/2018     INFLUENZA VACCINE (1) 09/01/2018     CREATININE Q1 YEAR  03/09/2019     LIPID MONITORING Q1 YEAR  03/09/2019     MICROALBUMIN Q1 YEAR  03/09/2019     KARLY QUESTIONNAIRE 1 YEAR  03/14/2019     PHQ-9 Q1YR  03/14/2019     COLONOSCOPY Q5 YR  03/19/2020     TETANUS IMMUNIZATION (SYSTEM ASSIGNED)  06/27/2021     PNEUMO 5YR BOOST DUE AFTER AGE 65 (NO IB MSG) (2) 08/23/2022     ADVANCE DIRECTIVE PLANNING Q5 YRS  03/14/2023     HEPATITIS C SCREENING  Addressed     Patient Active Problem List   Diagnosis     Agatston coronary artery calcium score greater than 400 in 5-13 Neg treadmill stress test in 2008      Type 2 diabetes mellitus with diabetic nephropathy (HCC)     Hypertension goal BP (blood pressure) < 140/90     Hyperlipidemia LDL goal <100     History of colonic polyps     Tobacco abuse: [age]y/o@ 1-2ppd=70 pk yr hx      Need for prophylactic vaccination against Streptococcus pneumoniae (pneumococcus)-ribs hurt too much      Microalbuminuria     Coronary artery disease involving native coronary artery of native heart without angina pectoris     Screening for prostate cancer     Past Surgical History:   Procedure Laterality Date     CARDIAC SURGERY  2016    stents x 2     HEAD & NECK SURGERY      dental implants     implant[      dental     SINUS SURGERY  1998       Social History   Substance Use Topics     Smoking status: Current Some Day Smoker     Types: Cigars     Smokeless tobacco: Never Used      Comment: Now smoking an Ecig     Alcohol use Yes      Comment: couple glasses wine/day     Family History   Problem Relation Age of Onset     Arthritis Mother      Cancer Mother 83     pancreatic cancer     Circulatory Father      AAA     Cancer Brother      unknown primary     Diabetes Son      type 1 with neuropathy           Pneumonia Vaccine:pneumovax    Review of  "Systems  Constitutional, HEENT, cardiovascular, pulmonary, GI, , musculoskeletal, neuro, skin, endocrine and psych systems are negative, except as otherwise noted.    OBJECTIVE:   /70  Pulse 61  Temp 97.3  F (36.3  C) (Tympanic)  Resp 14  Ht 5' 8.5\" (1.74 m)  Wt 195 lb (88.5 kg)  SpO2 100%  BMI 29.22 kg/m2 Estimated body mass index is 29.22 kg/(m^2) as calculated from the following:    Height as of this encounter: 5' 8.5\" (1.74 m).    Weight as of this encounter: 195 lb (88.5 kg).  Physical Exam  GENERAL: healthy, alert and no distress  EYES: Eyes grossly normal to inspection, PERRL and conjunctivae and sclerae normal  HENT: ear canals and TM's normal, nose and mouth without ulcers or lesions  NECK: no adenopathy, no asymmetry, masses, or scars and thyroid normal to palpation  RESP: lungs clear to auscultation - no rales, rhonchi or wheezes  CV: regular rate and rhythm, normal S1 S2, no S3 or S4, no murmur, click or rub, no peripheral edema and peripheral pulses strong  ABDOMEN: soft, nontender, no hepatosplenomegaly, no masses and bowel sounds normal   (male): normal male genitalia without lesions or urethral discharge, no hernia  RECTAL: normal sphincter tone, no rectal masses, prostate normal size, smooth, nontender without nodules or masses  MS: no gross musculoskeletal defects noted, no edema  SKIN: no suspicious lesions or rashes  NEURO: Normal strength and tone, mentation intact and speech normal  PSYCH: mentation appears normal, affect normal/bright        ASSESSMENT / PLAN:       ICD-10-CM    1. Routine medical exam Z00.00 PNEUMOCOCCAL VACCINE,ADULT,SQ OR IM     ADMIN 1st VACCINE   2. Screening for diabetic peripheral neuropathy Z13.89 FOOT EXAM  NO CHARGE [22189.114]   3. Need for prophylactic vaccination against Streptococcus pneumoniae (pneumococcus) Z23    4. Screening for prostate cancer Z12.5    5. Coronary artery disease involving native coronary artery of native heart without angina " "pectoris I25.10    6. Microalbuminuria R80.9    7. Type 2 diabetes mellitus with diabetic nephropathy, without long-term current use of insulin (H) E11.21 HEMOGLOBIN A1C     TSH WITH FREE T4 REFLEX     Albumin Random Urine Quantitative with Creat Ratio   8. Hypertension goal BP (blood pressure) < 140/90 I10 UA with Microscopic     CBC with platelets differential     Comprehensive metabolic panel   9. Hyperlipidemia LDL goal <100 E78.5 Lipid Profile       End of Life Planning:  Patient currently has an advanced directive: No.  I have verified the patient's ablity to prepare an advanced directive/make health care decisions.  Literature was provided to assist patient in preparing an advanced directive.    COUNSELING:  Reviewed preventive health counseling, as reflected in patient instructions       Regular exercise       Healthy diet/nutrition       Vision screening       Immunizations    Vaccinated for: Pneumococcal             Aspirin Prophylaxsis    BP Readings from Last 1 Encounters:   08/29/18 116/70     Estimated body mass index is 29.22 kg/(m^2) as calculated from the following:    Height as of this encounter: 5' 8.5\" (1.74 m).    Weight as of this encounter: 195 lb (88.5 kg).           reports that he has been smoking Cigars.  He has never used smokeless tobacco.  Tobacco Cessation Action Plan: Information offered: Patient not interested at this time    Appropriate preventive services were discussed with this patient, including applicable screening as appropriate for cardiovascular disease, diabetes, osteopenia/osteoporosis, and glaucoma.  As appropriate for age/gender, discussed screening for colorectal cancer, prostate cancer, breast cancer, and cervical cancer. Checklist reviewing preventive services available has been given to the patient.    Reviewed patients plan of care and provided an AVS. The Basic Care Plan (routine screening as documented in Health Maintenance) for Jakub meets the Care Plan " requirement. This Care Plan has been established and reviewed with the Patient.    Counseling Resources:  ATP IV Guidelines  Pooled Cohorts Equation Calculator  Breast Cancer Risk Calculator  FRAX Risk Assessment  ICSI Preventive Guidelines  Dietary Guidelines for Americans, 2010  USDA's MyPlate  ASA Prophylaxis  Lung CA Screening    Gavin Sevilla MD  [facility]  Answers for HPI/ROS submitted by the patient on 8/28/2018   PHQ-2 Score: 0

## 2018-08-31 ENCOUNTER — TELEPHONE (OUTPATIENT)
Dept: NURSING | Facility: CLINIC | Age: 68
End: 2018-08-31

## 2018-08-31 DIAGNOSIS — E78.5 HYPERLIPIDEMIA LDL GOAL <100: ICD-10-CM

## 2018-08-31 DIAGNOSIS — E11.21 TYPE 2 DIABETES MELLITUS WITH DIABETIC NEPHROPATHY, WITHOUT LONG-TERM CURRENT USE OF INSULIN (H): ICD-10-CM

## 2018-08-31 DIAGNOSIS — I10 HYPERTENSION GOAL BP (BLOOD PRESSURE) < 140/90: ICD-10-CM

## 2018-08-31 LAB
ALBUMIN SERPL-MCNC: 4.4 G/DL (ref 3.4–5)
ALBUMIN UR-MCNC: NEGATIVE MG/DL
ALP SERPL-CCNC: 49 U/L (ref 40–150)
ALT SERPL W P-5'-P-CCNC: 21 U/L (ref 0–70)
ANION GAP SERPL CALCULATED.3IONS-SCNC: 10 MMOL/L (ref 3–14)
APPEARANCE UR: CLEAR
AST SERPL W P-5'-P-CCNC: 16 U/L (ref 0–45)
BASOPHILS # BLD AUTO: 0 10E9/L (ref 0–0.2)
BASOPHILS NFR BLD AUTO: 0.4 %
BILIRUB SERPL-MCNC: 0.6 MG/DL (ref 0.2–1.3)
BILIRUB UR QL STRIP: NEGATIVE
BUN SERPL-MCNC: 12 MG/DL (ref 7–30)
CALCIUM SERPL-MCNC: 8.8 MG/DL (ref 8.5–10.1)
CHLORIDE SERPL-SCNC: 103 MMOL/L (ref 94–109)
CHOLEST SERPL-MCNC: 108 MG/DL
CO2 SERPL-SCNC: 22 MMOL/L (ref 20–32)
COLOR UR AUTO: YELLOW
CREAT SERPL-MCNC: 0.73 MG/DL (ref 0.66–1.25)
DIFFERENTIAL METHOD BLD: ABNORMAL
EOSINOPHIL # BLD AUTO: 0.3 10E9/L (ref 0–0.7)
EOSINOPHIL NFR BLD AUTO: 3.4 %
ERYTHROCYTE [DISTWIDTH] IN BLOOD BY AUTOMATED COUNT: 13.7 % (ref 10–15)
GFR SERPL CREATININE-BSD FRML MDRD: >90 ML/MIN/1.7M2
GLUCOSE SERPL-MCNC: 126 MG/DL (ref 70–99)
GLUCOSE UR STRIP-MCNC: NEGATIVE MG/DL
HBA1C MFR BLD: 6.5 % (ref 0–5.6)
HCT VFR BLD AUTO: 38.6 % (ref 40–53)
HDLC SERPL-MCNC: 67 MG/DL
HGB BLD-MCNC: 13.2 G/DL (ref 13.3–17.7)
HGB UR QL STRIP: ABNORMAL
KETONES UR STRIP-MCNC: NEGATIVE MG/DL
LDLC SERPL CALC-MCNC: 33 MG/DL
LEUKOCYTE ESTERASE UR QL STRIP: NEGATIVE
LYMPHOCYTES # BLD AUTO: 1.5 10E9/L (ref 0.8–5.3)
LYMPHOCYTES NFR BLD AUTO: 18.4 %
MCH RBC QN AUTO: 30.8 PG (ref 26.5–33)
MCHC RBC AUTO-ENTMCNC: 34.2 G/DL (ref 31.5–36.5)
MCV RBC AUTO: 90 FL (ref 78–100)
MONOCYTES # BLD AUTO: 1 10E9/L (ref 0–1.3)
MONOCYTES NFR BLD AUTO: 13.1 %
NEUTROPHILS # BLD AUTO: 5.1 10E9/L (ref 1.6–8.3)
NEUTROPHILS NFR BLD AUTO: 64.7 %
NITRATE UR QL: NEGATIVE
NONHDLC SERPL-MCNC: 41 MG/DL
PH UR STRIP: 7 PH (ref 5–7)
PLATELET # BLD AUTO: 201 10E9/L (ref 150–450)
POTASSIUM SERPL-SCNC: 4.2 MMOL/L (ref 3.4–5.3)
PROT SERPL-MCNC: 7.5 G/DL (ref 6.8–8.8)
RBC # BLD AUTO: 4.28 10E12/L (ref 4.4–5.9)
RBC #/AREA URNS AUTO: ABNORMAL /HPF
SODIUM SERPL-SCNC: 135 MMOL/L (ref 133–144)
SOURCE: ABNORMAL
SP GR UR STRIP: <=1.005 (ref 1–1.03)
TRIGL SERPL-MCNC: 40 MG/DL
TSH SERPL DL<=0.005 MIU/L-ACNC: 1.73 MU/L (ref 0.4–4)
UROBILINOGEN UR STRIP-ACNC: 0.2 EU/DL (ref 0.2–1)
WBC # BLD AUTO: 7.9 10E9/L (ref 4–11)
WBC #/AREA URNS AUTO: ABNORMAL /HPF

## 2018-08-31 PROCEDURE — 36415 COLL VENOUS BLD VENIPUNCTURE: CPT | Performed by: FAMILY MEDICINE

## 2018-08-31 PROCEDURE — 85025 COMPLETE CBC W/AUTO DIFF WBC: CPT | Performed by: FAMILY MEDICINE

## 2018-08-31 PROCEDURE — 82043 UR ALBUMIN QUANTITATIVE: CPT | Performed by: FAMILY MEDICINE

## 2018-08-31 PROCEDURE — 80061 LIPID PANEL: CPT | Performed by: FAMILY MEDICINE

## 2018-08-31 PROCEDURE — 80053 COMPREHEN METABOLIC PANEL: CPT | Performed by: FAMILY MEDICINE

## 2018-08-31 PROCEDURE — 84443 ASSAY THYROID STIM HORMONE: CPT | Performed by: FAMILY MEDICINE

## 2018-08-31 PROCEDURE — 81001 URINALYSIS AUTO W/SCOPE: CPT | Performed by: FAMILY MEDICINE

## 2018-08-31 PROCEDURE — 83036 HEMOGLOBIN GLYCOSYLATED A1C: CPT | Performed by: FAMILY MEDICINE

## 2018-08-31 NOTE — TELEPHONE ENCOUNTER
Patient in clinic c/o having painful arm and red area on arm where he received his second pneumovax.Pt's R arm shows two red areas on the inside of his arm and he states that it hurts from where he received the vaccine down to his elbow. Red spots about the size of a quarter,bright red and flat.They do not itch.Pt does not have any resp distress or difficulty swallowing. Advised to ice arm periodically and take benadryl q4- 6 hours. If sxs increase or persist will need to reevaluate.

## 2018-09-02 LAB
CREAT UR-MCNC: 21 MG/DL
MICROALBUMIN UR-MCNC: <5 MG/L
MICROALBUMIN/CREAT UR: NORMAL MG/G CR (ref 0–17)

## 2018-09-06 NOTE — PROGRESS NOTES
Dear Jakub,    Your tests were all normal. A copy of your tests are available in My Chart.    Glad to see you at your appointment.  If you have any questions feel free to call.      Sincerely,      MARYA Granados.

## 2018-10-19 DIAGNOSIS — E11.40 TYPE 2 DIABETES MELLITUS WITH DIABETIC NEUROPATHY (H): ICD-10-CM

## 2018-10-19 DIAGNOSIS — E11.21 TYPE 2 DIABETES MELLITUS WITH DIABETIC NEPHROPATHY, WITHOUT LONG-TERM CURRENT USE OF INSULIN (H): Primary | ICD-10-CM

## 2018-10-19 NOTE — TELEPHONE ENCOUNTER
Prescription approved per [facility] [place] for 6 months of refills since last appointment, which was 8/29/18

## 2018-10-19 NOTE — TELEPHONE ENCOUNTER
"Requested Prescriptions   Pending Prescriptions Disp Refills     blood glucose monitoring (ACCU-CHEK RYAN PLUS) test strip  Last Written Prescription Date:  11/17/16  Last Fill Quantity: 2 box,  # refills: 1   Last office visit: 8/29/2018 with prescribing provider:  Di   Future Office Visit:     200 strip 1     Sig: Use to test blood sugar 2 times daily or as directed.    Diabetic Supplies Protocol Passed    10/19/2018  9:35 AM       Passed - Patient is 18 years of age or older       Passed - Recent (6 mo) or future (30 days) visit within the authorizing provider's specialty    Patient had office visit in the last 6 months or has a visit in the next 30 days with authorizing provider.  See \"Patient Info\" tab in inbasket, or \"Choose Columns\" in Meds & Orders section of the refill encounter.              "

## 2018-10-20 NOTE — TELEPHONE ENCOUNTER
"Requested Prescriptions   Pending Prescriptions Disp Refills     ACCU-CHEK RYAN PLUS test strip [Pharmacy Med Name: ACCU-CHEK RYAN PLUS STRIPS 100'S]  Last Written Prescription Date:  10/19/2018  Last Fill Quantity: 200 STRIPS,  # refills: 1   Last office visit: 8/29/2018 with prescribing provider:   LILI  Future Office Visit:     200 strip 0     Sig: USE TO TEST BLOOD SUGAR TWICE DAILY    Diabetic Supplies Protocol Passed    10/19/2018  1:42 PM       Passed - Patient is 18 years of age or older       Passed - Recent (6 mo) or future (30 days) visit within the authorizing provider's specialty    Patient had office visit in the last 6 months or has a visit in the next 30 days with authorizing provider.  See \"Patient Info\" tab in inbasket, or \"Choose Columns\" in Meds & Orders section of the refill encounter.              "

## 2018-10-22 RX ORDER — BLOOD SUGAR DIAGNOSTIC
STRIP MISCELLANEOUS
Qty: 200 STRIP | Refills: 1 | Status: SHIPPED | OUTPATIENT
Start: 2018-10-22 | End: 2020-07-14

## 2018-10-27 DIAGNOSIS — E11.21 TYPE 2 DIABETES MELLITUS WITH DIABETIC NEPHROPATHY, WITHOUT LONG-TERM CURRENT USE OF INSULIN (H): Chronic | ICD-10-CM

## 2018-10-27 NOTE — TELEPHONE ENCOUNTER
"Requested Prescriptions   Pending Prescriptions Disp Refills     metFORMIN (GLUCOPHAGE) 500 MG tablet  Last Written Prescription Date:  07/24/2018  Last Fill Quantity: 90,  # refills: 0   Last office visit: 8/29/2018 with prescribing provider:   LILI  Future Office Visit:     90 tablet 0    Biguanide Agents Passed    10/27/2018 10:06 AM       Passed - Blood pressure less than 140/90 in past 6 months    BP Readings from Last 3 Encounters:   08/29/18 116/70   03/14/18 116/70   08/23/17 110/70                Passed - Patient has documented LDL within the past 12 mos.    Recent Labs   Lab Test  08/31/18   0821   LDL  33            Passed - Patient has had a Microalbumin in the past 15 mos.    Recent Labs   Lab Test  08/31/18   0836   09/20/12   1334   [id number]   --    --   5.0   [id number]   --    --   24.5*   MICROL  <5   < >   --    UMALCR  Unable to calculate due to low value   < >   --     < > = values in this interval not displayed.            Passed - Patient is age 10 or older       Passed - Patient has documented A1c within the specified period of time.    If HgbA1C is 8 or greater, it needs to be on file within the past 3 months.  If less than 8, must be on file within the past 6 months.     Recent Labs   Lab Test  08/31/18   0821   A1C  6.5*            Passed - Patient's CR is NOT>1.4 OR Patient's EGFR is NOT<45 within past 12 mos.    Recent Labs   Lab Test  08/31/18   0821   GFRESTIMATED  >90   GFRESTBLACK  >90       Recent Labs   Lab Test  08/31/18   0821   CR  0.73            Passed - Patient does NOT have a diagnosis of CHF.       Passed - Recent (6 mo) or future (30 days) visit within the authorizing provider's specialty    Patient had office visit in the last 6 months or has a visit in the next 30 days with authorizing provider or within the authorizing provider's specialty.  See \"Patient Info\" tab in inbasket, or \"Choose Columns\" in Meds & Orders section of the refill encounter.              "

## 2018-11-12 ENCOUNTER — TRANSFERRED RECORDS (OUTPATIENT)
Dept: HEALTH INFORMATION MANAGEMENT | Facility: CLINIC | Age: 68
End: 2018-11-12

## 2018-11-20 ENCOUNTER — TRANSFERRED RECORDS (OUTPATIENT)
Dept: HEALTH INFORMATION MANAGEMENT | Facility: CLINIC | Age: 68
End: 2018-11-20

## 2019-01-01 DIAGNOSIS — E11.21 TYPE 2 DIABETES MELLITUS WITH DIABETIC NEPHROPATHY, WITHOUT LONG-TERM CURRENT USE OF INSULIN (H): Chronic | ICD-10-CM

## 2019-01-02 NOTE — TELEPHONE ENCOUNTER
"Requested Prescriptions   Pending Prescriptions Disp Refills     metFORMIN (GLUCOPHAGE) 500 MG tablet [Pharmacy Med Name: METFORMIN HYDROCHLORIDE 500 MG Tablet]  Last Written Prescription Date:  10/30/2018  Last Fill Quantity: 90 tablet,  # refills: 0   Last office visit: 8/29/2018 with prescribing provider:  Di   Future Office Visit:     90 tablet 0     Sig: TAKE 1 TABLET EVERY DAY WITH DINNER    Biguanide Agents Passed - 1/1/2019  4:02 PM       Passed - Blood pressure less than 140/90 in past 6 months    BP Readings from Last 3 Encounters:   08/29/18 116/70   03/14/18 116/70   08/23/17 110/70                Passed - Patient has documented LDL within the past 12 mos.    Recent Labs   Lab Test 08/31/18 0821   LDL 33            Passed - Patient has had a Microalbumin in the past 15 mos.    Recent Labs   Lab Test 08/31/18 0836  09/20/12  1334   [id number]  --   --  5.0   [id number]  --   --  24.5*   MICROL <5   < >  --    UMALCR Unable to calculate due to low value   < >  --     < > = values in this interval not displayed.            Passed - Patient is age 10 or older       Passed - Patient has documented A1c within the specified period of time.    If HgbA1C is 8 or greater, it needs to be on file within the past 3 months.  If less than 8, must be on file within the past 6 months.     Recent Labs   Lab Test 08/31/18 0821   A1C 6.5*            Passed - Patient's CR is NOT>1.4 OR Patient's EGFR is NOT<45 within past 12 mos.    Recent Labs   Lab Test 08/31/18 0821   GFRESTIMATED >90   GFRESTBLACK >90       Recent Labs   Lab Test 08/31/18 0821   CR 0.73            Passed - Patient does NOT have a diagnosis of CHF.       Passed - Recent (6 mo) or future (30 days) visit within the authorizing provider's specialty    Patient had office visit in the last 6 months or has a visit in the next 30 days with authorizing provider or within the authorizing provider's specialty.  See \"Patient Info\" tab in inbasket, or \"Choose " "Columns\" in Meds & Orders section of the refill encounter.               "

## 2019-03-19 DIAGNOSIS — E11.21 TYPE 2 DIABETES MELLITUS WITH DIABETIC NEPHROPATHY, WITHOUT LONG-TERM CURRENT USE OF INSULIN (H): Chronic | ICD-10-CM

## 2019-03-20 NOTE — TELEPHONE ENCOUNTER
"Requested Prescriptions   Pending Prescriptions Disp Refills     glimepiride (AMARYL) 2 MG tablet [Pharmacy Med Name: GLIMEPIRIDE 2 MG Tablet]  Last Written Prescription Date:  10/29/2018  Last Fill Quantity: 90 tablet,  # refills: 1   Last office visit: 8/29/2018 with prescribing provider:  Di   Future Office Visit:     90 tablet 1     Sig: TAKE 1 TABLET EVERY MORNING BEFORE BREAKFAST (NEED 6 MONTH A1C)    Sulfonylurea Agents Failed - 3/19/2019  7:01 PM       Failed - Blood pressure less than 140/90 in past 6 months    BP Readings from Last 3 Encounters:   08/29/18 116/70   03/14/18 116/70   08/23/17 110/70                Failed - Patient has documented A1c within the specified period of time.    If HgbA1C is 8 or greater, it needs to be on file within the past 3 months.  If less than 8, must be on file within the past 6 months.     Recent Labs   Lab Test 08/31/18  0821   A1C 6.5*            Failed - Recent (6 mo) or future (30 days) visit within the authorizing provider's specialty    Patient had office visit in the last 6 months or has a visit in the next 30 days with authorizing provider or within the authorizing provider's specialty.  See \"Patient Info\" tab in inbasket, or \"Choose Columns\" in Meds & Orders section of the refill encounter.           Passed - Patient has documented LDL within the past 12 mos.    Recent Labs   Lab Test 08/31/18  0821   LDL 33            Passed - Patient has had a Microalbumin in the past 12 mos.    Recent Labs   Lab Test 08/31/18  0836  09/20/12  1334   [id number]  --   --  5.0   [id number]  --   --  24.5*   MICROL <5   < >  --    UMALCR Unable to calculate due to low value   < >  --     < > = values in this interval not displayed.            Passed - Medication is active on med list       Passed - Patient is age 18 or older       Passed - Patient has a recent creatinine (normal) within the past 12 mos.    Recent Labs   Lab Test 08/31/18  0821   CR 0.73             metFORMIN " "(GLUCOPHAGE) 500 MG tablet [Pharmacy Med Name: METFORMIN HYDROCHLORIDE 500 MG Tablet]  Last Written Prescription Date:  1/2/2019  Last Fill Quantity: 90 tablet,  # refills: 0   Last office visit: 8/29/2018 with prescribing provider:  Di   Future Office Visit:     90 tablet 0     Sig: TAKE 1 TABLET EVERY DAY WITH DINNER (NEED OFFICE VISIT)    Biguanide Agents Failed - 3/19/2019  7:01 PM       Failed - Blood pressure less than 140/90 in past 6 months    BP Readings from Last 3 Encounters:   08/29/18 116/70   03/14/18 116/70   08/23/17 110/70                Failed - Patient has documented A1c within the specified period of time.    If HgbA1C is 8 or greater, it needs to be on file within the past 3 months.  If less than 8, must be on file within the past 6 months.     Recent Labs   Lab Test 08/31/18 0821   A1C 6.5*            Failed - Recent (6 mo) or future (30 days) visit within the authorizing provider's specialty    Patient had office visit in the last 6 months or has a visit in the next 30 days with authorizing provider or within the authorizing provider's specialty.  See \"Patient Info\" tab in inbasket, or \"Choose Columns\" in Meds & Orders section of the refill encounter.           Passed - Patient has documented LDL within the past 12 mos.    Recent Labs   Lab Test 08/31/18 0821   LDL 33            Passed - Patient has had a Microalbumin in the past 15 mos.    Recent Labs   Lab Test 08/31/18  0836  09/20/12  1334   [id number]  --   --  5.0   [id number]  --   --  24.5*   MICROL <5   < >  --    UMALCR Unable to calculate due to low value   < >  --     < > = values in this interval not displayed.            Passed - Patient is age 10 or older       Passed - Patient's CR is NOT>1.4 OR Patient's EGFR is NOT<45 within past 12 mos.    Recent Labs   Lab Test 08/31/18 0821   GFRESTIMATED >90   GFRESTBLACK >90       Recent Labs   Lab Test 08/31/18 0821   CR 0.73            Passed - Patient does NOT have a diagnosis of " CHF.       Passed - Medication is active on med list

## 2019-03-21 ENCOUNTER — TRANSFERRED RECORDS (OUTPATIENT)
Dept: HEALTH INFORMATION MANAGEMENT | Facility: CLINIC | Age: 69
End: 2019-03-21

## 2019-03-21 RX ORDER — GLIMEPIRIDE 2 MG/1
TABLET ORAL
Qty: 30 TABLET | Refills: 0 | Status: SHIPPED | OUTPATIENT
Start: 2019-03-21 | End: 2019-06-07

## 2019-03-21 NOTE — TELEPHONE ENCOUNTER
Medication is being filled for 1 time refill only due to:  overdue for DM visit. Note to pharmacy     Roberta Wilks RN- Triage [employer]

## 2019-03-29 ENCOUNTER — TRANSFERRED RECORDS (OUTPATIENT)
Dept: HEALTH INFORMATION MANAGEMENT | Facility: CLINIC | Age: 69
End: 2019-03-29

## 2019-05-01 ENCOUNTER — TRANSFERRED RECORDS (OUTPATIENT)
Dept: HEALTH INFORMATION MANAGEMENT | Facility: CLINIC | Age: 69
End: 2019-05-01

## 2019-06-14 ENCOUNTER — TRANSFERRED RECORDS (OUTPATIENT)
Dept: HEALTH INFORMATION MANAGEMENT | Facility: CLINIC | Age: 69
End: 2019-06-14

## 2019-09-06 ENCOUNTER — OFFICE VISIT (OUTPATIENT)
Dept: FAMILY MEDICINE | Facility: CLINIC | Age: 69
End: 2019-09-06
Payer: MEDICARE

## 2019-09-06 VITALS
DIASTOLIC BLOOD PRESSURE: 74 MMHG | OXYGEN SATURATION: 99 % | BODY MASS INDEX: 29.25 KG/M2 | HEART RATE: 63 BPM | WEIGHT: 193 LBS | HEIGHT: 68 IN | SYSTOLIC BLOOD PRESSURE: 110 MMHG | RESPIRATION RATE: 12 BRPM | TEMPERATURE: 97.4 F

## 2019-09-06 DIAGNOSIS — I10 ESSENTIAL HYPERTENSION WITH GOAL BLOOD PRESSURE LESS THAN 140/90: ICD-10-CM

## 2019-09-06 DIAGNOSIS — E78.5 HYPERLIPIDEMIA LDL GOAL <100: ICD-10-CM

## 2019-09-06 DIAGNOSIS — R80.9 MICROALBUMINURIA: ICD-10-CM

## 2019-09-06 DIAGNOSIS — I10 HYPERTENSION GOAL BP (BLOOD PRESSURE) < 140/90: ICD-10-CM

## 2019-09-06 DIAGNOSIS — Z00.00 ENCOUNTER FOR MEDICARE ANNUAL WELLNESS EXAM: Primary | ICD-10-CM

## 2019-09-06 DIAGNOSIS — E11.21 TYPE 2 DIABETES MELLITUS WITH DIABETIC NEPHROPATHY, WITHOUT LONG-TERM CURRENT USE OF INSULIN (H): ICD-10-CM

## 2019-09-06 DIAGNOSIS — Z12.5 SCREENING FOR PROSTATE CANCER: ICD-10-CM

## 2019-09-06 LAB
ALBUMIN UR-MCNC: NEGATIVE MG/DL
APPEARANCE UR: CLEAR
BASOPHILS # BLD AUTO: 0.1 10E9/L (ref 0–0.2)
BASOPHILS NFR BLD AUTO: 0.9 %
BILIRUB UR QL STRIP: NEGATIVE
COLOR UR AUTO: YELLOW
DIFFERENTIAL METHOD BLD: ABNORMAL
EOSINOPHIL # BLD AUTO: 0.2 10E9/L (ref 0–0.7)
EOSINOPHIL NFR BLD AUTO: 2.8 %
ERYTHROCYTE [DISTWIDTH] IN BLOOD BY AUTOMATED COUNT: 13.8 % (ref 10–15)
GLUCOSE UR STRIP-MCNC: NEGATIVE MG/DL
HBA1C MFR BLD: 6.7 % (ref 0–5.6)
HCT VFR BLD AUTO: 36.9 % (ref 40–53)
HGB BLD-MCNC: 13 G/DL (ref 13.3–17.7)
HGB UR QL STRIP: NEGATIVE
KETONES UR STRIP-MCNC: NEGATIVE MG/DL
LEUKOCYTE ESTERASE UR QL STRIP: NEGATIVE
LYMPHOCYTES # BLD AUTO: 1.4 10E9/L (ref 0.8–5.3)
LYMPHOCYTES NFR BLD AUTO: 24.3 %
MCH RBC QN AUTO: 30.9 PG (ref 26.5–33)
MCHC RBC AUTO-ENTMCNC: 35.2 G/DL (ref 31.5–36.5)
MCV RBC AUTO: 88 FL (ref 78–100)
MONOCYTES # BLD AUTO: 0.8 10E9/L (ref 0–1.3)
MONOCYTES NFR BLD AUTO: 13.1 %
NEUTROPHILS # BLD AUTO: 3.4 10E9/L (ref 1.6–8.3)
NEUTROPHILS NFR BLD AUTO: 58.9 %
NITRATE UR QL: NEGATIVE
PH UR STRIP: 6 PH (ref 5–7)
PLATELET # BLD AUTO: 196 10E9/L (ref 150–450)
RBC # BLD AUTO: 4.21 10E12/L (ref 4.4–5.9)
RBC #/AREA URNS AUTO: NORMAL /HPF
SOURCE: NORMAL
SP GR UR STRIP: <=1.005 (ref 1–1.03)
UROBILINOGEN UR STRIP-ACNC: 0.2 EU/DL (ref 0.2–1)
WBC # BLD AUTO: 5.8 10E9/L (ref 4–11)
WBC #/AREA URNS AUTO: NORMAL /HPF

## 2019-09-06 PROCEDURE — 81001 URINALYSIS AUTO W/SCOPE: CPT | Performed by: FAMILY MEDICINE

## 2019-09-06 PROCEDURE — 80053 COMPREHEN METABOLIC PANEL: CPT | Performed by: FAMILY MEDICINE

## 2019-09-06 PROCEDURE — 82043 UR ALBUMIN QUANTITATIVE: CPT | Performed by: FAMILY MEDICINE

## 2019-09-06 PROCEDURE — 85025 COMPLETE CBC W/AUTO DIFF WBC: CPT | Performed by: FAMILY MEDICINE

## 2019-09-06 PROCEDURE — G0439 PPPS, SUBSEQ VISIT: HCPCS | Performed by: FAMILY MEDICINE

## 2019-09-06 PROCEDURE — 83036 HEMOGLOBIN GLYCOSYLATED A1C: CPT | Performed by: FAMILY MEDICINE

## 2019-09-06 PROCEDURE — G0103 PSA SCREENING: HCPCS | Performed by: FAMILY MEDICINE

## 2019-09-06 PROCEDURE — 80061 LIPID PANEL: CPT | Performed by: FAMILY MEDICINE

## 2019-09-06 PROCEDURE — 36415 COLL VENOUS BLD VENIPUNCTURE: CPT | Performed by: FAMILY MEDICINE

## 2019-09-06 RX ORDER — LOSARTAN POTASSIUM 100 MG/1
TABLET ORAL
Qty: 90 TABLET | Refills: 1 | Status: SHIPPED | OUTPATIENT
Start: 2019-09-06 | End: 2020-01-29

## 2019-09-06 RX ORDER — AMLODIPINE BESYLATE 5 MG/1
TABLET ORAL
Qty: 90 TABLET | Refills: 1 | Status: SHIPPED | OUTPATIENT
Start: 2019-09-06 | End: 2020-01-29

## 2019-09-06 ASSESSMENT — ANXIETY QUESTIONNAIRES
GAD7 TOTAL SCORE: 0
GAD7 TOTAL SCORE: 0
3. WORRYING TOO MUCH ABOUT DIFFERENT THINGS: NOT AT ALL
5. BEING SO RESTLESS THAT IT IS HARD TO SIT STILL: NOT AT ALL
6. BECOMING EASILY ANNOYED OR IRRITABLE: NOT AT ALL
7. FEELING AFRAID AS IF SOMETHING AWFUL MIGHT HAPPEN: NOT AT ALL
4. TROUBLE RELAXING: NOT AT ALL
2. NOT BEING ABLE TO STOP OR CONTROL WORRYING: NOT AT ALL
7. FEELING AFRAID AS IF SOMETHING AWFUL MIGHT HAPPEN: NOT AT ALL
1. FEELING NERVOUS, ANXIOUS, OR ON EDGE: NOT AT ALL
GAD7 TOTAL SCORE: 0

## 2019-09-06 ASSESSMENT — ACTIVITIES OF DAILY LIVING (ADL): CURRENT_FUNCTION: NO ASSISTANCE NEEDED

## 2019-09-06 ASSESSMENT — MIFFLIN-ST. JEOR: SCORE: 1623.12

## 2019-09-06 NOTE — PROGRESS NOTES
"A  Annual Exam:  If you checked off any problems, how difficult have these problems made it for you to do your work, take care of things at home, or get along with other people?: Not difficult at all  PHQ9 TOTAL SCORE: 0Answers for HPI/ROS submitted by the patient on 9/6/2019   Annual Exam:  If you checked off any problems, how difficult have these problems made it for you to do your work, take care of things at home, or get along with other people?: Not difficult at all  PHQ9 TOTAL SCORE: 0  KARLY 7 TOTAL SCORE: 0    Conflicting answers have been found for some questions. Please document the patient's answers manually.   KARLY 7 TOTAL SCORE: 0    Conflicting answers have been found for some questions. Please document the patient's answers manually. SUBJECTIVE:   Jakub Robles is a 69 year old male who presents for Preventive Visit.    Are you in the first 12 months of your Medicare coverage?  No    Healthy Habits:     Duration of exercise:  Greater than 60 minutes    Do you usually eat at least 4 servings of fruit and vegetables a day, include whole grains    & fiber and avoid regularly eating high fat or \"junk\" foods?  Yes    Taking medications regularly:  Yes    Medication side effects:  None    Ability to successfully perform activities of daily living:  No assistance needed    Home Safety:  No safety concerns identified    Hearing Impairment:  No hearing concerns    In the past 6 months, have you been bothered by leaking of urine?  No    In general, how would you rate your overall mental or emotional health?  Excellent      PHQ-2 Total Score: 0    Additional concerns today:  No     Answers for HPI/ROS submitted by the patient on 9/6/2019   Annual Exam:  If you checked off any problems, how difficult have these problems made it for you to do your work, take care of things at home, or get along with other people?: Not difficult at all  PHQ9 TOTAL SCORE: 0  KARLY 7 TOTAL SCORE: 0    Diabetes Follow-up      How " often are you checking your blood sugar? Two times daily    What time of day are you checking your blood sugars (select all that apply)?  Before and after meals    Have you had any blood sugars above 200?  No    Have you had any blood sugars below 70?  Yes rarely    What symptoms do you notice when your blood sugar is low?  None    What concerns do you have today about your diabetes? None     Do you have any of these symptoms? (Select all that apply)  Numbness in feet     Have you had a diabetic eye exam in the last 12 months? Yes- Date of last eye exam: [facility] 11/2018. Schedule for recheck in Nov 2019.    Diabetes Management Resources    Hyperlipidemia Follow-Up      Are you having any of the following symptoms? (Select all that apply)  No complaints of shortness of breath, chest pain or pressure.  No increased sweating or nausea with activity.  No left-sided neck or arm pain.  No complaints of pain in calves when walking 1-2 blocks.    Are you regularly taking any medication or supplement to lower your cholesterol?   Yes- Atorvastatin    Are you having muscle aches or other side effects that you think could be caused by your cholesterol lowering medication?  No    Hypertension Follow-up      Do you check your blood pressure regularly outside of the clinic? No     Are you following a low salt diet? Yes    Are your blood pressures ever more than 140 on the top number (systolic) OR more   than 90 on the bottom number (diastolic), for example 140/90? n/a    BP Readings from Last 2 Encounters:   09/06/19 110/74   08/29/18 116/70     Hemoglobin A1C (%)   Date Value   09/06/2019 6.7 (H)   08/31/2018 6.5 (H)     LDL Cholesterol Calculated (mg/dL)   Date Value   08/31/2018 33   03/09/2018 35     Vascular Disease Follow-up      Are you having any of the following symptoms? (Select all that apply) No complaints of shortness of breath, chest pain or pressure.  No increased sweating or nausea with activity.  No  left-sided neck or arm pain.  No complaints of pain in calves when walking 1-2 blocks.    How often do you take nitroglycerin? Never    Do you take an aspirin every day? Yes    Do you feel safe in your environment? Yes    Do you have a Health Care Directive? Yes: Patient states has Advance Directive and will bring in a copy to clinic.      Fall risk  Fallen 2 or more times in the past year?: No  Any fall with injury in the past year?: No      Cognitive Screening   1) Repeat 3 items (Leader, Season, Table)    2) Clock draw: NORMAL  3) 3 item recall: Recalls 2 objects   Results: NORMAL clock, 1-2 items recalled: COGNITIVE IMPAIRMENT LESS LIKELY    Mini-CogTM Copyright S Evelyn. Licensed by the author for use in [facility]; reprinted with permission (boni@[facility]). All rights reserved.      Do you have sleep apnea, excessive snoring or daytime drowsiness?: no    Reviewed and updated as needed this visit by clinical staff  Tobacco  Allergies  Meds  Med Hx  Surg Hx  Fam Hx  Soc Hx        Reviewed and updated as needed this visit by Provider        Social History     Tobacco Use     Smoking status: Current Some Day Smoker     Types: Cigars     Smokeless tobacco: Never Used     Tobacco comment: Now smoking an Ecig   Substance Use Topics     Alcohol use: Yes     Comment: couple glasses wine/day     If you drink alcohol do you typically have >3 drinks per day or >7 drinks per week? No    Alcohol Use 9/6/2019   Prescreen: >3 drinks/day or >7 drinks/week? Yes   Prescreen: >3 drinks/day or >7 drinks/week? -   AUDIT SCORE  5     AUDIT - Alcohol Use Disorders Identification Test - Reproduced from the World Health Organization Audit 2001 (Second Edition) 9/6/2019   1.  How often do you have a drink containing alcohol? 4 or more times a week   2.  How many drinks containing alcohol do you have on a typical day when you are drinking? 1 or 2   3.  How often do you have five or more drinks on one occasion? Less than  monthly   4.  How often during the last year have you found that you were not able to stop drinking once you had started? Never   5.  How often during the last year have you failed to do what was normally expected of you because of drinking? Never   6.  How often during the last year have you needed a first drink in the morning to get yourself going after a heavy drinking session? Never   7.  How often during the last year have you had a feeling of guilt or remorse after drinking? Never   8.  How often during the last year have you been unable to remember what happened the night before because of your drinking? Never   9.  Have you or someone else been injured because of your drinking? No   10. Has a relative, friend, doctor or other health care worker been concerned about your drinking or suggested you cut down? No   TOTAL SCORE 5     Current providers sharing in care for this patient include:   Patient Care Team:  Gavin Sevilla MD as PCP - General  Gavin Sevilla MD as Assigned PCP    The following health maintenance items are reviewed in Epic and correct as of today:  Health Maintenance   Topic Date Due     ZOSTER IMMUNIZATION (1 of 2) 07/17/2001     AORTIC ANEURYSM SCREENING (SYSTEM ASSIGNED)  07/17/2016     DIABETIC FOOT EXAM  08/23/2018     A1C  02/28/2019     BMP  08/31/2019     LIPID  08/31/2019     MICROALBUMIN  08/31/2019     INFLUENZA VACCINE (1) 09/01/2019     MEDICARE ANNUAL WELLNESS VISIT  08/29/2019     EYE EXAM  11/12/2019     COLONOSCOPY  03/19/2020     TSH W/FREE T4 REFLEX  08/31/2020     KARLY ASSESSMENT  09/06/2020     FALL RISK ASSESSMENT  09/06/2020     PHQ-9  09/06/2020     DTAP/TDAP/TD IMMUNIZATION (2 - Td) 06/27/2021     ADVANCE CARE PLANNING  03/14/2023     PNEUMOCOCCAL IMMUNIZATION 65+ LOW/MEDIUM RISK  Completed     HEPATITIS C SCREENING  Addressed     IPV IMMUNIZATION  Aged Out     MENINGITIS IMMUNIZATION  Aged Out     Patient Active Problem List   Diagnosis     Kip  "coronary artery calcium score greater than 400 in 5-13 Neg treadmill stress test in 2008      Type 2 diabetes mellitus with diabetic nephropathy (HCC)     Hypertension goal BP (blood pressure) < 140/90     Hyperlipidemia LDL goal <100     History of colonic polyps     Tobacco abuse: [age]y/o@ 1-2ppd=71 pk yr hx      Need for prophylactic vaccination against Streptococcus pneumoniae (pneumococcus)-ribs hurt too much      Microalbuminuria     Coronary artery disease involving native coronary artery of native heart without angina pectoris     Screening for prostate cancer     Past Surgical History:   Procedure Laterality Date     CARDIAC SURGERY  2016    stents x 2     HEAD & NECK SURGERY      dental implants     implant[      dental     SINUS SURGERY  1998       Social History     Tobacco Use     Smoking status: Current Some Day Smoker     Types: Cigars     Smokeless tobacco: Never Used     Tobacco comment: Now smoking an Ecig   Substance Use Topics     Alcohol use: Yes     Comment: couple glasses wine/day     Family History   Problem Relation Age of Onset     Arthritis Mother      Cancer Mother 83        pancreatic cancer     Circulatory Father         AAA     Cancer Brother         unknown primary     Diabetes Son         type 1 with neuropathy             Review of Systems  Constitutional, HEENT, cardiovascular, pulmonary, GI, , musculoskeletal, neuro, skin, endocrine and psych systems are negative, except as otherwise noted.    OBJECTIVE:   /74 (BP Location: Left arm, Patient Position: Sitting, Cuff Size: Adult Regular)   Pulse 63   Temp 97.4  F (36.3  C) (Tympanic)   Resp 12   Ht 1.732 m (5' 8.2\")   Wt 87.5 kg (193 lb)   SpO2 99%   BMI 29.17 kg/m   Estimated body mass index is 29.17 kg/m  as calculated from the following:    Height as of this encounter: 1.732 m (5' 8.2\").    Weight as of this encounter: 87.5 kg (193 lb).  Physical Exam  GENERAL: healthy, alert and no distress  EYES: Eyes grossly " normal to inspection, PERRL and conjunctivae and sclerae normal  HENT: ear canals and TM's normal, nose and mouth without ulcers or lesions  NECK: no adenopathy, no asymmetry, masses, or scars and thyroid normal to palpation  RESP: lungs clear to auscultation - no rales, rhonchi or wheezes  CV: regular rate and rhythm, normal S1 S2, no S3 or S4, no murmur, click or rub, no peripheral edema and peripheral pulses strong  ABDOMEN: soft, nontender, no hepatosplenomegaly, no masses and bowel sounds normal   (male): normal male genitalia without lesions or urethral discharge, no hernia  RECTAL: normal sphincter tone, no rectal masses, prostate normal size, smooth, nontender without nodules or masses  MS: no gross musculoskeletal defects noted, no edema  SKIN: no suspicious lesions or rashes  NEURO: Normal strength and tone, mentation intact and speech normal  PSYCH: mentation appears normal, affect normal/bright  LYMPH: no cervical, supraclavicular, axillary, or inguinal adenopathy        ASSESSMENT / PLAN:       ICD-10-CM    1. Encounter for Medicare annual wellness exam Z00.00    2. Essential hypertension with goal blood pressure less than 140/90 I10 amLODIPine (NORVASC) 5 MG tablet     losartan (COZAAR) 100 MG tablet   3. Hyperlipidemia LDL goal <100 E78.5 Lipid panel reflex to direct LDL Fasting   4. Type 2 diabetes mellitus with diabetic nephropathy, without long-term current use of insulin (H) E11.21 HEMOGLOBIN A1C     Albumin Random Urine Quantitative with Creat Ratio   5. Screening for prostate cancer Z12.5 Prostate spec antigen screen   6. Hypertension goal BP (blood pressure) < 140/90 I10 UA with Microscopic     CBC with platelets differential     Comprehensive metabolic panel   7. Microalbuminuria R80.9 Albumin Random Urine Quantitative with Creat Ratio       End of Life Planning:  Patient currently has an advanced directive: No.  I have verified the patient's ablity to prepare an advanced directive/make  "health care decisions.  Literature was provided to assist patient in preparing an advanced directive.    COUNSELING:  Reviewed preventive health counseling, as reflected in patient instructions       Regular exercise       Healthy diet/nutrition       Prostate cancer screening    Estimated body mass index is 29.17 kg/m  as calculated from the following:    Height as of this encounter: 1.732 m (5' 8.2\").    Weight as of this encounter: 87.5 kg (193 lb).    Weight management plan: Discussed healthy diet and exercise guidelines     reports that he has been smoking cigars.  He has never used smokeless tobacco.      Appropriate preventive services were discussed with this patient, including applicable screening as appropriate for cardiovascular disease, diabetes, osteopenia/osteoporosis, and glaucoma.  As appropriate for age/gender, discussed screening for colorectal cancer, prostate cancer, breast cancer, and cervical cancer. Checklist reviewing preventive services available has been given to the patient.    Reviewed patients plan of care and provided an AVS. The Basic Care Plan (routine screening as documented in Health Maintenance) for Jakub meets the Care Plan requirement. This Care Plan has been established and reviewed with the Patient.    Counseling Resources:  ATP IV Guidelines  Pooled Cohorts Equation Calculator  Breast Cancer Risk Calculator  FRAX Risk Assessment  ICSI Preventive Guidelines  Dietary Guidelines for Americans, 2010  Janalakshmi's MyPlate  ASA Prophylaxis  Lung CA Screening    Gavin Sevilla MD  [facility]    Identified Health Risks:  "

## 2019-09-06 NOTE — RESULT ENCOUNTER NOTE
Dear Jakub,    Your tests were all normal. A copy of your tests are available in My Chart.    We can repeat these in 6 months.    Glad to see you at your appointment.  If you have any questions feel free to call.      Sincerely,      MARYA Granados.

## 2019-09-06 NOTE — PATIENT INSTRUCTIONS
Patient Education   Personalized Prevention Plan  You are due for the preventive services outlined below.  Your care team is available to assist you in scheduling these services.  If you have already completed any of these items, please share that information with your care team to update in your medical record.  Health Maintenance Due   Topic Date Due     Zoster (Shingles) Vaccine (1 of 2) 07/17/2001     AORTIC ANEURYSM SCREENING (SYSTEM ASSIGNED)  07/17/2016     Diabetic Foot Exam  08/23/2018     A1C Lab  02/28/2019     Basic Metabolic Panel  08/31/2019     Cholesterol Lab  08/31/2019     Kidney Microalbumin Urine Test  08/31/2019     Flu Vaccine (1) 09/01/2019     Annual Wellness Visit  08/29/2019

## 2019-09-07 LAB
ALBUMIN SERPL-MCNC: 4.5 G/DL (ref 3.4–5)
ALP SERPL-CCNC: 46 U/L (ref 40–150)
ALT SERPL W P-5'-P-CCNC: 24 U/L (ref 0–70)
ANION GAP SERPL CALCULATED.3IONS-SCNC: 6 MMOL/L (ref 3–14)
AST SERPL W P-5'-P-CCNC: 15 U/L (ref 0–45)
BILIRUB SERPL-MCNC: 0.5 MG/DL (ref 0.2–1.3)
BUN SERPL-MCNC: 13 MG/DL (ref 7–30)
CALCIUM SERPL-MCNC: 8.7 MG/DL (ref 8.5–10.1)
CHLORIDE SERPL-SCNC: 103 MMOL/L (ref 94–109)
CHOLEST SERPL-MCNC: 116 MG/DL
CO2 SERPL-SCNC: 26 MMOL/L (ref 20–32)
CREAT SERPL-MCNC: 0.77 MG/DL (ref 0.66–1.25)
CREAT UR-MCNC: 40 MG/DL
GFR SERPL CREATININE-BSD FRML MDRD: >90 ML/MIN/{1.73_M2}
GLUCOSE SERPL-MCNC: 126 MG/DL (ref 70–99)
HDLC SERPL-MCNC: 75 MG/DL
LDLC SERPL CALC-MCNC: 34 MG/DL
MICROALBUMIN UR-MCNC: 5 MG/L
MICROALBUMIN/CREAT UR: 13.19 MG/G CR (ref 0–17)
NONHDLC SERPL-MCNC: 41 MG/DL
POTASSIUM SERPL-SCNC: 4.2 MMOL/L (ref 3.4–5.3)
PROT SERPL-MCNC: 7.6 G/DL (ref 6.8–8.8)
PSA SERPL-ACNC: 1.03 UG/L (ref 0–4)
SODIUM SERPL-SCNC: 135 MMOL/L (ref 133–144)
TRIGL SERPL-MCNC: 33 MG/DL

## 2019-09-07 ASSESSMENT — ANXIETY QUESTIONNAIRES: GAD7 TOTAL SCORE: 0

## 2019-09-07 ASSESSMENT — PATIENT HEALTH QUESTIONNAIRE - PHQ9: SUM OF ALL RESPONSES TO PHQ QUESTIONS 1-9: 0

## 2019-09-24 ENCOUNTER — TELEPHONE (OUTPATIENT)
Dept: FAMILY MEDICINE | Facility: CLINIC | Age: 69
End: 2019-09-24

## 2019-09-24 NOTE — TELEPHONE ENCOUNTER
Our goal is to have forms completed within 72 hours, however some forms may require a visit or additional information.    What clinic location was the form placed at [facility] or [facility]     Who is the form from?   Where did the form come from? Faxed to clinic   The form was placed in the inbox of Gavin Sevilla MD      Please fax to [phone]  Phone number: [phone]    Additional comments: [facility] diabetic CMN test strips& lancets     Call take on 9/24/2019 at 4:39 PM by Rizwana Winston

## 2019-10-02 ENCOUNTER — HEALTH MAINTENANCE LETTER (OUTPATIENT)
Age: 69
End: 2019-10-02

## 2019-11-05 ENCOUNTER — TRANSFERRED RECORDS (OUTPATIENT)
Dept: HEALTH INFORMATION MANAGEMENT | Facility: CLINIC | Age: 69
End: 2019-11-05

## 2019-11-15 DIAGNOSIS — E11.21 TYPE 2 DIABETES MELLITUS WITH DIABETIC NEPHROPATHY, WITHOUT LONG-TERM CURRENT USE OF INSULIN (H): Chronic | ICD-10-CM

## 2019-11-16 NOTE — TELEPHONE ENCOUNTER
"Requested Prescriptions   Pending Prescriptions Disp Refills     glimepiride (AMARYL) 2 MG tablet [Pharmacy Med Name: GLIMEPIRIDE 2 MG Tablet]    Last Written Prescription Date:  08/17/2019  Last Fill Quantity: 90 tablet,  # refills: 0   Last office visit: 9/6/2019 with prescribing provider:  Di   Future Office Visit:     90 tablet 0     Sig: TAKE 1 TABLET EVERY MORNING BEFORE BREAKFAST (NEED 6 MONTH A1C)       Sulfonylurea Agents Passed - 11/15/2019  9:20 PM        Passed - Blood pressure less than 140/90 in past 6 months     BP Readings from Last 3 Encounters:   09/06/19 110/74   08/29/18 116/70   03/14/18 116/70                 Passed - Patient has documented LDL within the past 12 mos.     Recent Labs   Lab Test 09/06/19  1114   LDL 34             Passed - Patient has had a Microalbumin in the past 15 mos.     Recent Labs   Lab Test 09/06/19  1114  09/20/12  1334   [id number]  --   --  5.0   [id number]  --   --  24.5*   MICROL 5   < >  --    UMALCR 13.19   < >  --     < > = values in this interval not displayed.             Passed - Patient has documented A1c within the specified period of time.     If HgbA1C is 8 or greater, it needs to be on file within the past 3 months.  If less than 8, must be on file within the past 6 months.     Recent Labs   Lab Test 09/06/19  1114   A1C 6.7*             Passed - Medication is active on med list        Passed - Patient is age 18 or older        Passed - Patient has a recent creatinine (normal) within the past 12 mos.     Recent Labs   Lab Test 09/06/19  1114   CR 0.77             Passed - Recent (6 mo) or future (30 days) visit within the authorizing provider's specialty     Patient had office visit in the last 6 months or has a visit in the next 30 days with authorizing provider or within the authorizing provider's specialty.  See \"Patient Info\" tab in inbasket, or \"Choose Columns\" in Meds & Orders section of the refill encounter.               "

## 2019-11-18 RX ORDER — GLIMEPIRIDE 2 MG/1
TABLET ORAL
Qty: 90 TABLET | Refills: 0 | Status: SHIPPED | OUTPATIENT
Start: 2019-11-18 | End: 2020-01-29

## 2019-11-20 ENCOUNTER — TRANSFERRED RECORDS (OUTPATIENT)
Dept: HEALTH INFORMATION MANAGEMENT | Facility: CLINIC | Age: 69
End: 2019-11-20

## 2019-11-25 DIAGNOSIS — E11.21 TYPE 2 DIABETES MELLITUS WITH DIABETIC NEPHROPATHY, WITHOUT LONG-TERM CURRENT USE OF INSULIN (H): Chronic | ICD-10-CM

## 2019-11-25 NOTE — TELEPHONE ENCOUNTER
"Requested Prescriptions   Pending Prescriptions Disp Refills     metFORMIN (GLUCOPHAGE) 500 MG tablet  Last Written Prescription Date:  8/17/2019  Last Fill Quantity: 90 tablet,  # refills: 0   Last office visit: 9/6/2019 with prescribing provider:  Di   Future Office Visit:     90 tablet 0       Biguanide Agents Passed - 11/25/2019  9:51 AM        Passed - Blood pressure less than 140/90 in past 6 months     BP Readings from Last 3 Encounters:   09/06/19 110/74   08/29/18 116/70   03/14/18 116/70                 Passed - Patient has documented LDL within the past 12 mos.     Recent Labs   Lab Test 09/06/19  1114   LDL 34             Passed - Patient has had a Microalbumin in the past 15 mos.     Recent Labs   Lab Test 09/06/19  1114  09/20/12  1334   [id number]  --   --  5.0   [id number]  --   --  24.5*   MICROL 5   < >  --    UMALCR 13.19   < >  --     < > = values in this interval not displayed.             Passed - Patient is age 10 or older        Passed - Patient has documented A1c within the specified period of time.     If HgbA1C is 8 or greater, it needs to be on file within the past 3 months.  If less than 8, must be on file within the past 6 months.     Recent Labs   Lab Test 09/06/19  1114   A1C 6.7*             Passed - Patient's CR is NOT>1.4 OR Patient's EGFR is NOT<45 within past 12 mos.     Recent Labs   Lab Test 09/06/19  1114   GFRESTIMATED >90   GFRESTBLACK >90       Recent Labs   Lab Test 09/06/19  1114   CR 0.77             Passed - Patient does NOT have a diagnosis of CHF.        Passed - Medication is active on med list        Passed - Recent (6 mo) or future (30 days) visit within the authorizing provider's specialty     Patient had office visit in the last 6 months or has a visit in the next 30 days with authorizing provider or within the authorizing provider's specialty.  See \"Patient Info\" tab in inbasket, or \"Choose Columns\" in Meds & Orders section of the refill encounter.               "

## 2020-01-24 ENCOUNTER — MYC MEDICAL ADVICE (OUTPATIENT)
Dept: FAMILY MEDICINE | Facility: CLINIC | Age: 70
End: 2020-01-24

## 2020-01-24 DIAGNOSIS — H91.10 PRESBYCUSIS, UNSPECIFIED LATERALITY: Primary | ICD-10-CM

## 2020-01-24 NOTE — TELEPHONE ENCOUNTER
The patient does not need an office visit with me.  I will send a referral for him to go to [facility] over on [address]

## 2020-01-24 NOTE — TELEPHONE ENCOUNTER
Routing to provider- Please see prior message from pt.    Does pt need to see your for audiology issues?    Please let Nurse Triage know if we should do anything for follow up. Thank you!

## 2020-01-27 DIAGNOSIS — E11.21 TYPE 2 DIABETES MELLITUS WITH DIABETIC NEPHROPATHY, WITHOUT LONG-TERM CURRENT USE OF INSULIN (H): Chronic | ICD-10-CM

## 2020-01-27 DIAGNOSIS — I10 ESSENTIAL HYPERTENSION WITH GOAL BLOOD PRESSURE LESS THAN 140/90: ICD-10-CM

## 2020-01-27 NOTE — TELEPHONE ENCOUNTER
"Requested Prescriptions   Pending Prescriptions Disp Refills     amLODIPine (NORVASC) 5 MG tablet [Pharmacy Med Name: AMLODIPINE BESYLATE 5 MG Tablet]  Last Written Prescription Date:  9/6/2019  Last Fill Quantity: 90 tablet,  # refills: 1   Last office visit: 9/6/2019 with prescribing provider:  Di   Future Office Visit:     90 tablet 1     Sig: TAKE 1 TABLET EVERY DAY       Calcium Channel Blockers Protocol  Passed - 1/27/2020  4:26 PM        Passed - Blood pressure under 140/90 in past 12 months     BP Readings from Last 3 Encounters:   09/06/19 110/74   08/29/18 116/70   03/14/18 116/70                 Passed - Recent (12 mo) or future (30 days) visit within the authorizing provider's specialty     Patient has had an office visit with the authorizing provider or a provider within the authorizing providers department within the previous 12 mos or has a future within next 30 days. See \"Patient Info\" tab in inbasket, or \"Choose Columns\" in Meds & Orders section of the refill encounter.              Passed - Medication is active on med list        Passed - Patient is age 18 or older        Passed - Normal serum creatinine on file in past 12 months     Recent Labs   Lab Test 09/06/19  1114   CR 0.77             losartan (COZAAR) 100 MG tablet [Pharmacy Med Name: LOSARTAN POTASSIUM 100 MG Tablet]  Last Written Prescription Date:  9/6/2019  Last Fill Quantity: 90 tablet,  # refills: 1   Last office visit: 9/6/2019 with prescribing provider:  Di   Future Office Visit:     90 tablet 1     Sig: TAKE 1 TABLET EVERY DAY       Angiotensin-II Receptors Passed - 1/27/2020  4:26 PM        Passed - Last blood pressure under 140/90 in past 12 months     BP Readings from Last 3 Encounters:   09/06/19 110/74   08/29/18 116/70   03/14/18 116/70                 Passed - Recent (12 mo) or future (30 days) visit within the authorizing provider's specialty     Patient has had an office visit with the authorizing provider " "or a provider within the authorizing providers department within the previous 12 mos or has a future within next 30 days. See \"Patient Info\" tab in inbasket, or \"Choose Columns\" in Meds & Orders section of the refill encounter.              Passed - Medication is active on med list        Passed - Patient is age 18 or older        Passed - Normal serum creatinine on file in past 12 months     Recent Labs   Lab Test 09/06/19  1114   CR 0.77             Passed - Normal serum potassium on file in past 12 months     Recent Labs   Lab Test 09/06/19  1114   POTASSIUM 4.2                    glimepiride (AMARYL) 2 MG tablet [Pharmacy Med Name: GLIMEPIRIDE 2 MG Tablet]  Last Written Prescription Date:  11/18/2019  Last Fill Quantity: 90 tablet,  # refills: 0   Last office visit: 9/6/2019 with prescribing provider:  Di   Future Office Visit:     90 tablet 0     Sig: TAKE 1 TABLET EVERY MORNING BEFORE BREAKFAST (NEED 6 MONTH A1C)       Sulfonylurea Agents Passed - 1/27/2020  4:26 PM        Passed - Blood pressure less than 140/90 in past 6 months     BP Readings from Last 3 Encounters:   09/06/19 110/74   08/29/18 116/70   03/14/18 116/70                 Passed - Patient has documented LDL within the past 12 mos.     Recent Labs   Lab Test 09/06/19  1114   LDL 34             Passed - Patient has had a Microalbumin in the past 15 mos.     Recent Labs   Lab Test 09/06/19  1114  09/20/12  1334   [id number]  --   --  5.0   [id number]  --   --  24.5*   MICROL 5   < >  --    UMALCR 13.19   < >  --     < > = values in this interval not displayed.             Passed - Patient has documented A1c within the specified period of time.     If HgbA1C is 8 or greater, it needs to be on file within the past 3 months.  If less than 8, must be on file within the past 6 months.     Recent Labs   Lab Test 09/06/19  1114   A1C 6.7*             Passed - Medication is active on med list        Passed - Patient is age 18 or older        Passed - " "Patient has a recent creatinine (normal) within the past 12 mos.     Recent Labs   Lab Test 09/06/19  1114   CR 0.77             Passed - Recent (6 mo) or future (30 days) visit within the authorizing provider's specialty     Patient had office visit in the last 6 months or has a visit in the next 30 days with authorizing provider or within the authorizing provider's specialty.  See \"Patient Info\" tab in inbasket, or \"Choose Columns\" in Meds & Orders section of the refill encounter.            metFORMIN (GLUCOPHAGE) 500 MG tablet [Pharmacy Med Name: METFORMIN HYDROCHLORIDE 500 MG Tablet]  Last Written Prescription Date:  11/25/2019  Last Fill Quantity: 90 tablet,  # refills: 0   Last office visit: 9/6/2019 with prescribing provider:  Di   Future Office Visit:     90 tablet 0     Sig: TAKE 1 TABLET EVERY DAY WITH DINNER       Biguanide Agents Passed - 1/27/2020  4:26 PM        Passed - Blood pressure less than 140/90 in past 6 months     BP Readings from Last 3 Encounters:   09/06/19 110/74   08/29/18 116/70   03/14/18 116/70                 Passed - Patient has documented LDL within the past 12 mos.     Recent Labs   Lab Test 09/06/19  1114   LDL 34             Passed - Patient has had a Microalbumin in the past 15 mos.     Recent Labs   Lab Test 09/06/19  1114  09/20/12  1334   [id number]  --   --  5.0   [id number]  --   --  24.5*   MICROL 5   < >  --    UMALCR 13.19   < >  --     < > = values in this interval not displayed.             Passed - Patient is age 10 or older        Passed - Patient has documented A1c within the specified period of time.     If HgbA1C is 8 or greater, it needs to be on file within the past 3 months.  If less than 8, must be on file within the past 6 months.     Recent Labs   Lab Test 09/06/19  1114   A1C 6.7*             Passed - Patient's CR is NOT>1.4 OR Patient's EGFR is NOT<45 within past 12 mos.     Recent Labs   Lab Test 09/06/19  1114   GFRESTIMATED >90   GFRESTBLACK >90 " "      Recent Labs   Lab Test 09/06/19  1114   CR 0.77             Passed - Patient does NOT have a diagnosis of CHF.        Passed - Medication is active on med list        Passed - Recent (6 mo) or future (30 days) visit within the authorizing provider's specialty     Patient had office visit in the last 6 months or has a visit in the next 30 days with authorizing provider or within the authorizing provider's specialty.  See \"Patient Info\" tab in inbasket, or \"Choose Columns\" in Meds & Orders section of the refill encounter.               "

## 2020-01-29 RX ORDER — AMLODIPINE BESYLATE 5 MG/1
TABLET ORAL
Qty: 90 TABLET | Refills: 0 | Status: SHIPPED | OUTPATIENT
Start: 2020-01-29 | End: 2020-04-08

## 2020-01-29 RX ORDER — GLIMEPIRIDE 2 MG/1
TABLET ORAL
Qty: 90 TABLET | Refills: 0 | Status: SHIPPED | OUTPATIENT
Start: 2020-01-29 | End: 2020-04-08

## 2020-01-29 RX ORDER — LOSARTAN POTASSIUM 100 MG/1
TABLET ORAL
Qty: 90 TABLET | Refills: 0 | Status: SHIPPED | OUTPATIENT
Start: 2020-01-29 | End: 2020-04-08

## 2020-01-29 NOTE — TELEPHONE ENCOUNTER
amLODIPine (NORVASC) 5 MG tablet   Prescription approved for 6 months of refills since last appointment, which was 09/06/19    losartan (COZAAR) 100 MG tablet   Prescription approved for 6 months of refills since last appointment, which was 09/06/19    glimepiride (AMARYL) 2 MG tablet  Prescription approved per [facility] for 6 months of refills since last appointment, which was 09/06/19    metFORMIN (GLUCOPHAGE) 500 MG tablet  Prescription approved per [facility] for 6 months of refills since last appointment, which was 09/06/19    Needs to be seen:

## 2020-02-12 ENCOUNTER — DOCUMENTATION ONLY (OUTPATIENT)
Dept: FAMILY MEDICINE | Facility: CLINIC | Age: 70
End: 2020-02-12

## 2020-02-28 ENCOUNTER — TRANSFERRED RECORDS (OUTPATIENT)
Dept: HEALTH INFORMATION MANAGEMENT | Facility: CLINIC | Age: 70
End: 2020-02-28

## 2020-03-11 ENCOUNTER — TELEPHONE (OUTPATIENT)
Dept: FAMILY MEDICINE | Facility: CLINIC | Age: 70
End: 2020-03-11

## 2020-03-11 NOTE — LETTER
March 11, 2020    Jakub Robles  [address] [facility] [address]    Dear Alejandro Call cares about your health and your health plan.  I have reviewed your medical conditions, medication list and lab results, and am making recommendations based on this review to better manage your health.    You are in particular need of attention regarding:  -Wellness (Physical) Visit     I am recommending that you:     -schedule a WELLNESS (Physical) APPOINTMENT with me.         Please call us at the [facility] location:  [phone] or use [facility] to address the above recommendations.     Thank you for trusting [facility].  We appreciate the opportunity to serve you and look forward to supporting your healthcare in the future.    If you have (or plan to have) any of these tests done at a facility other than a [facility] or a [facility], please have the results sent to the [facility] location noted above.      Best Regards,    Gavin Sevilla MD

## 2020-03-11 NOTE — TELEPHONE ENCOUNTER
Panel Management Review      Patient has the following on his problem list: None      Composite cancer screening  Chart review shows that this patient is due/due soon for the following None  Summary:    Patient is due/failing the following:   PHYSICAL    Action needed:   Patient needs office visit for Physical.    Type of outreach:    Sent letter.    Questions for provider review:    None                                                                                                                                    Mariana Curry LPN     Chart routed to  .

## 2020-03-22 ENCOUNTER — HEALTH MAINTENANCE LETTER (OUTPATIENT)
Age: 70
End: 2020-03-22

## 2020-04-08 DIAGNOSIS — E78.5 HYPERLIPIDEMIA LDL GOAL <100: Primary | ICD-10-CM

## 2020-04-08 DIAGNOSIS — E11.21 TYPE 2 DIABETES MELLITUS WITH DIABETIC NEPHROPATHY, WITHOUT LONG-TERM CURRENT USE OF INSULIN (H): ICD-10-CM

## 2020-04-08 DIAGNOSIS — I10 HYPERTENSION GOAL BP (BLOOD PRESSURE) < 140/90: ICD-10-CM

## 2020-06-15 DIAGNOSIS — I10 ESSENTIAL HYPERTENSION WITH GOAL BLOOD PRESSURE LESS THAN 140/90: ICD-10-CM

## 2020-06-15 NOTE — TELEPHONE ENCOUNTER
Appointment needed for patient (Virtual med check)    Please call patient 2 times in attempt to schedule an appointment for ASAP.    If appointment scheduled, please check with patient to see if they have enough medication to get them to appointment.  Please route back to triage with the above information.    If unable to get a hold of patient, please route to the provider.

## 2020-06-17 RX ORDER — AMLODIPINE BESYLATE 5 MG/1
TABLET ORAL
Qty: 90 TABLET | Refills: 0 | OUTPATIENT
Start: 2020-06-17

## 2020-06-17 NOTE — TELEPHONE ENCOUNTER
Pt scheduled virtual visit with Dr. Sevilla on 6/22. I called him and he states that he has enough medication to last him until then. Please remove the requested med. Thanks!

## 2020-06-22 ENCOUNTER — VIRTUAL VISIT (OUTPATIENT)
Dept: FAMILY MEDICINE | Facility: CLINIC | Age: 70
End: 2020-06-22
Payer: MEDICARE

## 2020-06-22 VITALS
DIASTOLIC BLOOD PRESSURE: 72 MMHG | HEART RATE: 58 BPM | BODY MASS INDEX: 29.02 KG/M2 | TEMPERATURE: 97 F | SYSTOLIC BLOOD PRESSURE: 131 MMHG | WEIGHT: 192 LBS | OXYGEN SATURATION: 98 %

## 2020-06-22 DIAGNOSIS — E11.21 TYPE 2 DIABETES MELLITUS WITH DIABETIC NEPHROPATHY, WITHOUT LONG-TERM CURRENT USE OF INSULIN (H): Primary | ICD-10-CM

## 2020-06-22 DIAGNOSIS — I10 ESSENTIAL HYPERTENSION WITH GOAL BLOOD PRESSURE LESS THAN 140/90: ICD-10-CM

## 2020-06-22 DIAGNOSIS — E78.5 HYPERLIPIDEMIA LDL GOAL <70: ICD-10-CM

## 2020-06-22 PROCEDURE — 99442 ZZC PHYSICIAN TELEPHONE EVALUATION 11-20 MIN: CPT | Performed by: FAMILY MEDICINE

## 2020-06-22 RX ORDER — AMLODIPINE BESYLATE 5 MG/1
5 TABLET ORAL DAILY
Qty: 90 TABLET | Refills: 1 | Status: SHIPPED | OUTPATIENT
Start: 2020-06-22 | End: 2020-11-19

## 2020-06-22 RX ORDER — LOSARTAN POTASSIUM 100 MG/1
100 TABLET ORAL DAILY
Qty: 90 TABLET | Refills: 1 | Status: SHIPPED | OUTPATIENT
Start: 2020-06-22 | End: 2020-11-11

## 2020-06-22 RX ORDER — ATORVASTATIN CALCIUM 40 MG/1
TABLET, FILM COATED ORAL
Qty: 90 TABLET | Refills: 3 | Status: SHIPPED | OUTPATIENT
Start: 2020-06-22 | End: 2021-08-01

## 2020-06-22 NOTE — PATIENT INSTRUCTIONS
We discussed checking his blood sugar just once a day and varying the time of day.  His blood pressure today was 131/70.  I did refill his medications as noted.  Labs are ordered.  Follow-up will be pending review of his tests.

## 2020-06-22 NOTE — PROGRESS NOTES
"Jakub Robles is a 69 year old male who is being evaluated via a billable video visit.      The patient has been notified of following:     \"This video visit will be conducted via a call between you and your physician/provider. We have found that certain health care needs can be provided without the need for an in-person physical exam.  This service lets us provide the care you need with a video conversation.  If a prescription is necessary we can send it directly to your pharmacy.  If lab work is needed we can place an order for that and you can then stop by our lab to have the test done at a later time.    Video visits are billed at different rates depending on your insurance coverage.  Please reach out to your insurance provider with any questions.    If during the course of the call the physician/provider feels a video visit is not appropriate, you will not be charged for this service.\"    Patient has given verbal consent for Video visit? Yes    Wants invite sent to email- [email or web address]    Will anyone else be joining your video visit? No    Subjective     Jakub Robles is a 69 year old male who presents today via video visit for the following health issues:    HPI  Diabetes Follow-up    How often are you checking your blood sugar? Two times daily  Blood sugar testing frequency justification:  Patient modifying lifestyle changes (diet, exercise) with blood sugars  What time of day are you checking your blood sugars (select all that apply)?  Not applicable  Have you had any blood sugars above 200?  No  Have you had any blood sugars below 70?  No    What symptoms do you notice when your blood sugar is low?  None    What concerns do you have today about your diabetes? None   Do you have any of these symptoms? (Select all that apply) numbness in feet            Hyperlipidemia Follow-Up      Are you regularly taking any medication or supplement to lower your cholesterol?   Yes- na    Are you having " muscle aches or other side effects that you think could be caused by your cholesterol lowering medication?  No    Hypertension Follow-up      Do you check your blood pressure regularly outside of the clinic? Yes     Are you following a low salt diet? Yes    Are your blood pressures ever more than 140 on the top number (systolic) OR more   than 90 on the bottom number (diastolic), for example 140/90? No    BP Readings from Last 2 Encounters:   06/22/20 131/72   09/06/19 110/74     Hemoglobin A1C (%)   Date Value   09/06/2019 6.7 (H)   08/31/2018 6.5 (H)     LDL Cholesterol Calculated (mg/dL)   Date Value   09/06/2019 34   08/31/2018 33         How many servings of fruits and vegetables do you eat daily?  2-3    On average, how many sweetened beverages do you drink each day (Examples: soda, juice, sweet tea, etc.  Do NOT count diet or artificially sweetened beverages)?   0    How many days per week do you exercise enough to make your heart beat faster? 3 or less    How many minutes a day do you exercise enough to make your heart beat faster? 9 or less    How many days per week do you miss taking your medication? 0        Video Start Time: Video visit was attempted twice through my chart and 1 section the ninth which were successful.  The visits was then changed to a telephone visit.        Patient Active Problem List   Diagnosis     Agatston coronary artery calcium score greater than 400 in 5-13 Neg treadmill stress test in 2008      Type 2 diabetes mellitus with diabetic nephropathy (HCC)     Hypertension goal BP (blood pressure) < 140/90     Hyperlipidemia LDL goal <100     History of colonic polyps     Tobacco abuse: [age]y/o@ 1-2ppd=71 pk yr hx      Need for prophylactic vaccination against Streptococcus pneumoniae (pneumococcus)-ribs hurt too much      Microalbuminuria     Coronary artery disease involving native coronary artery of native heart without angina pectoris     Screening for prostate cancer     Past  "Surgical History:   Procedure Laterality Date     CARDIAC SURGERY  2016    stents x 2     HEAD & NECK SURGERY      dental implants     implant[      dental     SINUS SURGERY  1998       Social History     Tobacco Use     Smoking status: Current Some Day Smoker     Types: Cigars     Smokeless tobacco: Never Used     Tobacco comment: Now smoking an Ecig   Substance Use Topics     Alcohol use: Yes     Comment: couple glasses wine/day     Family History   Problem Relation Age of Onset     Arthritis Mother      Cancer Mother 83        pancreatic cancer     Circulatory Father         AAA     Cancer Brother         unknown primary     Diabetes Son         type 1 with neuropathy           Reviewed and updated as needed this visit by Provider         Review of Systems   Constitutional, HEENT, cardiovascular, pulmonary, gi and gu systems are negative, except as otherwise noted.      Objective    /72   Pulse 58   Temp 97  F (36.1  C)   Wt 87.1 kg (192 lb)   SpO2 98%   BMI 29.02 kg/m    Estimated body mass index is 29.02 kg/m  as calculated from the following:    Height as of 9/6/19: 1.732 m (5' 8.2\").    Weight as of this encounter: 87.1 kg (192 lb).  Physical Exam     GENERAL: alert  RESP: No audible wheeze, cough, or visible cyanosis.  No visible retractions or increased work of breathing.    PSYCH: Mentation appears normal, affect normal/bright, judgement and insight intact, normal speech and appearance well-groomed.              Assessment & Plan       ICD-10-CM    1. Type 2 diabetes mellitus with diabetic nephropathy, without long-term current use of insulin (H)  E11.21 Albumin Random Urine Quantitative with Creat Ratio     Hemoglobin A1c   2. Essential hypertension with goal blood pressure less than 140/90  I10 amLODIPine (NORVASC) 5 MG tablet     losartan (COZAAR) 100 MG tablet     Basic metabolic panel   3. Hyperlipidemia LDL goal <70  E78.5 atorvastatin (LIPITOR) 40 MG tablet     Lipid Profile     ALT      " "  Tobacco Cessation:   reports that he has been smoking cigars. He has never used smokeless tobacco.        BMI:   Estimated body mass index is 29.02 kg/m  as calculated from the following:    Height as of 9/6/19: 1.732 m (5' 8.2\").    Weight as of this encounter: 87.1 kg (192 lb).           Patient Instructions   We discussed checking his blood sugar just once a day and varying the time of day.  His blood pressure today was 131/70.  I did refill his medications as noted.  Labs are ordered.  Follow-up will be pending review of his tests.      No follow-ups on file.    Gavin Sevilla MD  [facility]      Video-Visit Details    Type of service:  Video Visit    Video End Time: Length of telephone visit was 16 minutes    Originating Location (pt. Location): Home    Distant Location (provider location):  [facility]     Platform used for Video Visit: Unable to complete video visit    No follow-ups on file.       Gavin Sevilla MD        "

## 2020-06-24 DIAGNOSIS — E78.5 HYPERLIPIDEMIA LDL GOAL <70: ICD-10-CM

## 2020-06-24 DIAGNOSIS — I10 ESSENTIAL HYPERTENSION WITH GOAL BLOOD PRESSURE LESS THAN 140/90: ICD-10-CM

## 2020-06-24 DIAGNOSIS — E11.21 TYPE 2 DIABETES MELLITUS WITH DIABETIC NEPHROPATHY, WITHOUT LONG-TERM CURRENT USE OF INSULIN (H): ICD-10-CM

## 2020-06-24 DIAGNOSIS — I10 HYPERTENSION GOAL BP (BLOOD PRESSURE) < 140/90: ICD-10-CM

## 2020-06-24 LAB
ALT SERPL W P-5'-P-CCNC: 25 U/L (ref 0–70)
ANION GAP SERPL CALCULATED.3IONS-SCNC: 4 MMOL/L (ref 3–14)
BUN SERPL-MCNC: 17 MG/DL (ref 7–30)
CALCIUM SERPL-MCNC: 9 MG/DL (ref 8.5–10.1)
CHLORIDE SERPL-SCNC: 103 MMOL/L (ref 94–109)
CHOLEST SERPL-MCNC: 113 MG/DL
CO2 SERPL-SCNC: 27 MMOL/L (ref 20–32)
CREAT SERPL-MCNC: 0.81 MG/DL (ref 0.66–1.25)
CREAT UR-MCNC: 19 MG/DL
GFR SERPL CREATININE-BSD FRML MDRD: >90 ML/MIN/{1.73_M2}
GLUCOSE SERPL-MCNC: 184 MG/DL (ref 70–99)
HBA1C MFR BLD: 7.5 % (ref 0–5.6)
HDLC SERPL-MCNC: 69 MG/DL
LDLC SERPL CALC-MCNC: 35 MG/DL
MICROALBUMIN UR-MCNC: <5 MG/L
MICROALBUMIN/CREAT UR: NORMAL MG/G CR (ref 0–17)
NONHDLC SERPL-MCNC: 44 MG/DL
POTASSIUM SERPL-SCNC: 4.2 MMOL/L (ref 3.4–5.3)
SODIUM SERPL-SCNC: 134 MMOL/L (ref 133–144)
TRIGL SERPL-MCNC: 44 MG/DL

## 2020-06-24 PROCEDURE — 80048 BASIC METABOLIC PNL TOTAL CA: CPT | Performed by: FAMILY MEDICINE

## 2020-06-24 PROCEDURE — 83036 HEMOGLOBIN GLYCOSYLATED A1C: CPT | Performed by: FAMILY MEDICINE

## 2020-06-24 PROCEDURE — 84460 ALANINE AMINO (ALT) (SGPT): CPT | Performed by: FAMILY MEDICINE

## 2020-06-24 PROCEDURE — 82043 UR ALBUMIN QUANTITATIVE: CPT | Performed by: FAMILY MEDICINE

## 2020-06-24 PROCEDURE — 80061 LIPID PANEL: CPT | Performed by: FAMILY MEDICINE

## 2020-06-24 PROCEDURE — 36415 COLL VENOUS BLD VENIPUNCTURE: CPT | Performed by: FAMILY MEDICINE

## 2020-07-14 DIAGNOSIS — E11.40 TYPE 2 DIABETES MELLITUS WITH DIABETIC NEUROPATHY (H): ICD-10-CM

## 2020-07-14 RX ORDER — BLOOD SUGAR DIAGNOSTIC
STRIP MISCELLANEOUS
Qty: 200 STRIP | Refills: 1 | Status: SHIPPED | OUTPATIENT
Start: 2020-07-14 | End: 2022-09-02

## 2020-09-07 DIAGNOSIS — E11.21 TYPE 2 DIABETES MELLITUS WITH DIABETIC NEPHROPATHY, WITHOUT LONG-TERM CURRENT USE OF INSULIN (H): Chronic | ICD-10-CM

## 2020-09-08 RX ORDER — GLIMEPIRIDE 2 MG/1
TABLET ORAL
Qty: 90 TABLET | Refills: 0 | Status: SHIPPED | OUTPATIENT
Start: 2020-09-08 | End: 2020-11-23

## 2020-09-11 ENCOUNTER — ALLIED HEALTH/NURSE VISIT (OUTPATIENT)
Dept: NURSING | Facility: CLINIC | Age: 70
End: 2020-09-11
Payer: MEDICARE

## 2020-09-11 DIAGNOSIS — Z23 NEED FOR PROPHYLACTIC VACCINATION AND INOCULATION AGAINST INFLUENZA: Primary | ICD-10-CM

## 2020-09-11 PROCEDURE — 90662 IIV NO PRSV INCREASED AG IM: CPT

## 2020-09-11 PROCEDURE — 99207 ZZC NO CHARGE NURSE ONLY: CPT

## 2020-09-11 PROCEDURE — G0008 ADMIN INFLUENZA VIRUS VAC: HCPCS

## 2020-11-11 DIAGNOSIS — I10 ESSENTIAL HYPERTENSION WITH GOAL BLOOD PRESSURE LESS THAN 140/90: ICD-10-CM

## 2020-11-11 RX ORDER — LOSARTAN POTASSIUM 100 MG/1
TABLET ORAL
Qty: 90 TABLET | Refills: 1 | Status: SHIPPED | OUTPATIENT
Start: 2020-11-11 | End: 2021-08-01

## 2020-11-16 DIAGNOSIS — E11.21 TYPE 2 DIABETES MELLITUS WITH DIABETIC NEPHROPATHY, WITHOUT LONG-TERM CURRENT USE OF INSULIN (H): Chronic | ICD-10-CM

## 2020-11-18 DIAGNOSIS — I10 ESSENTIAL HYPERTENSION WITH GOAL BLOOD PRESSURE LESS THAN 140/90: ICD-10-CM

## 2020-11-19 RX ORDER — AMLODIPINE BESYLATE 5 MG/1
5 TABLET ORAL DAILY
Qty: 90 TABLET | Refills: 1 | Status: SHIPPED | OUTPATIENT
Start: 2020-11-19 | End: 2021-07-23

## 2020-12-08 ENCOUNTER — VIRTUAL VISIT (OUTPATIENT)
Dept: INTERNAL MEDICINE | Facility: CLINIC | Age: 70
End: 2020-12-08
Payer: MEDICARE

## 2020-12-08 DIAGNOSIS — N52.9 ERECTILE DYSFUNCTION, UNSPECIFIED ERECTILE DYSFUNCTION TYPE: ICD-10-CM

## 2020-12-08 DIAGNOSIS — E11.21 TYPE 2 DIABETES MELLITUS WITH DIABETIC NEPHROPATHY, WITHOUT LONG-TERM CURRENT USE OF INSULIN (H): Primary | ICD-10-CM

## 2020-12-08 DIAGNOSIS — I10 HYPERTENSION GOAL BP (BLOOD PRESSURE) < 140/90: ICD-10-CM

## 2020-12-08 DIAGNOSIS — E78.5 HYPERLIPIDEMIA LDL GOAL <100: ICD-10-CM

## 2020-12-08 PROCEDURE — 99214 OFFICE O/P EST MOD 30 MIN: CPT | Mod: 95 | Performed by: FAMILY MEDICINE

## 2020-12-08 RX ORDER — SILDENAFIL 50 MG/1
50-100 TABLET, FILM COATED ORAL DAILY PRN
Qty: 6 TABLET | Refills: 0 | Status: SHIPPED | OUTPATIENT
Start: 2020-12-08 | End: 2021-07-21 | Stop reason: ALTCHOICE

## 2020-12-08 RX ORDER — FLASH GLUCOSE SCANNING READER
1 EACH MISCELLANEOUS ONCE
Qty: 1 DEVICE | Refills: 0 | Status: SHIPPED | OUTPATIENT
Start: 2020-12-08 | End: 2020-12-08

## 2020-12-08 RX ORDER — FLASH GLUCOSE SENSOR
1 KIT MISCELLANEOUS
Qty: 2 EACH | Refills: 11 | Status: SHIPPED | OUTPATIENT
Start: 2020-12-08 | End: 2021-07-12

## 2020-12-08 NOTE — PROGRESS NOTES
"Jakub Robles is a 70 year old male who is being evaluated via a billable video visit.      The patient has been notified of following:     \"This video visit will be conducted via a call between you and your physician/provider. We have found that certain health care needs can be provided without the need for an in-person physical exam.  This service lets us provide the care you need with a video conversation.  If a prescription is necessary we can send it directly to your pharmacy.  If lab work is needed we can place an order for that and you can then stop by our lab to have the test done at a later time.    Video visits are billed at different rates depending on your insurance coverage.  Please reach out to your insurance provider with any questions.    If during the course of the call the physician/provider feels a video visit is not appropriate, you will not be charged for this service.\"    Patient has given verbal consent for Video visit? Yes  How would you like to obtain your AVS? VivaSmarthareOriginal  If you are dropped from the video visit, the video invite should be resent to: Other e-mail: through [employer]  Will anyone else be joining your video visit? No      Subjective     Jakub Robles is a 70 year old male who presents today via video visit for the following health issues:    HPI     Diabetes Follow-up    How often are you checking your blood sugar? Two times daily  Blood sugar testing frequency justification:  Uncontrolled diabetes  What time of day are you checking your blood sugars (select all that apply)?  Before meals  Have you had any blood sugars above 200?  No  Have you had any blood sugars below 70?  No    What symptoms do you notice when your blood sugar is low?  None    What concerns do you have today about your diabetes? None     Do you have any of these symptoms? (Select all that apply)  No numbness or tingling in feet.  No redness, sores or blisters on feet.  No complaints of excessive thirst. "  No reports of blurry vision.  No significant changes to weight.    Have you had a diabetic eye exam in the last 12 months? Yes-  Location: [facility]  states that he is about due to have another eye exam.        BP Readings from Last 2 Encounters:   06/22/20 131/72   09/06/19 110/74     Hemoglobin A1C (%)   Date Value   06/24/2020 7.5 (H)   09/06/2019 6.7 (H)     LDL Cholesterol Calculated (mg/dL)   Date Value   06/24/2020 35   09/06/2019 34               Hypertension Follow-up      Do you check your blood pressure regularly outside of the clinic? No     Are you following a low salt diet? Yes    Are your blood pressures ever more than 140 on the top number (systolic) OR more   than 90 on the bottom number (diastolic), for example 140/90? No      How many servings of fruits and vegetables do you eat daily?  2-3    On average, how many sweetened beverages do you drink each day (Examples: soda, juice, sweet tea, etc.  Do NOT count diet or artificially sweetened beverages)?   0    How many days per week do you exercise enough to make your heart beat faster? 4    How many minutes a day do you exercise enough to make your heart beat faster? 60 or more    How many days per week do you miss taking your medication? 0         Video Start Time: 4:30    Genitourinary - Male  Onset/Duration: Months  Description:   Dysuria (painful urination): no}  Hematuria (blood in urine): no  Frequency: no  Waking at night to urinate: no  Hesitancy (delay in urine): no  Retention (unable to empty): no  Decrease in urinary flow: no  Incontinence: no  Progression of Symptoms:  intermittent  Accompanying Signs & Symptoms:  Fever: no  Back/Flank pain: no  Urethral discharge: no  Testicle lumps/masses/pain: no  Nausea and/or vomiting: no  Abdominal pain: no  History:   History of frequent UTI s: no  History of kidney stones: no  History of hernias: no  Personal or Family history of Prostate problems: no  Sexually active: YES-and the  patient is having trouble with erectile dysfunction.  Precipitating or alleviating factors: None  Therapies tried and outcome: none      Review of Systems   Constitutional, HEENT, cardiovascular, pulmonary, gi and gu systems are negative, except as otherwise noted.      Objective    Vitals - Patient Reported  Weight (Patient Reported): 86.2 kg (190 lb)      Vitals:  No vitals were obtained today due to virtual visit.    Physical Exam     GENERAL: Healthy, alert and no distress  EYES: Eyes grossly normal to inspection.  No discharge or erythema, or obvious scleral/conjunctival abnormalities.  RESP: No audible wheeze, cough, or visible cyanosis.  No visible retractions or increased work of breathing.    SKIN: Visible skin clear. No significant rash, abnormal pigmentation or lesions.  NEURO: Cranial nerves grossly intact.  Mentation and speech appropriate for age.  PSYCH: Mentation appears normal, affect normal/bright, judgement and insight intact, normal speech and appearance well-groomed.              Assessment & Plan     Type 2 diabetes mellitus with diabetic nephropathy, without long-term current use of insulin (H)    - Albumin Random Urine Quantitative with Creat Ratio  - Hemoglobin A1c  - Continuous Blood Gluc  (FREESTYLE JUNIOR 14 DAY READER) ANTONIA  Dispense: 1 Device; Refill: 0  - Continuous Blood Gluc Sensor (FREESTYLE JUNIOR 14 DAY SENSOR) MISC  Dispense: 2 each; Refill: 11    Hyperlipidemia LDL goal <100    - Lipid Profile    Hypertension goal BP (blood pressure) < 140/90    - Comprehensive metabolic panel    Erectile dysfunction, unspecified erectile dysfunction type         Tobacco Cessation:   reports that he has been smoking cigars. He has never used smokeless tobacco.           Patient Instructions   A prescription for freestyle junior  and sensors was sent to his pharmacy.  Lab tests were ordered.  I also sent a prescription for Viagra to the pharmacy for him to try  mg for erectile  dysfunction.      Return in about 6 months (around 6/8/2021) for diabetes, dyslipidemia, labs, hypertension, preventive visit.    Gavin Sevilla MD  [facility]      Video-Visit Details    Type of service:  Video Visit    Video End Time:4:48    Originating Location (pt. Location): Home    Distant Location (provider location):  [facility]     Platform used for Video Visit: May[employer]iva Sant[employer]©

## 2020-12-08 NOTE — PATIENT INSTRUCTIONS
A prescription for freestyle gwen  and sensors was sent to his pharmacy.  Lab tests were ordered.  I also sent a prescription for Viagra to the pharmacy for him to try  mg for erectile dysfunction.

## 2020-12-11 ENCOUNTER — TELEPHONE (OUTPATIENT)
Dept: INTERNAL MEDICINE | Facility: CLINIC | Age: 70
End: 2020-12-11

## 2020-12-11 NOTE — TELEPHONE ENCOUNTER
Central Prior Authorization Team   Phone: [phone]      PA Initiation    Medication: Continuous Blood Gluc Sensor (FREESTYLE JUNIOR 14 DAY SENSOR) [facility] [place] [place]  Insurance Company: [employer] - Phone [phone] Fax [phone]  Pharmacy Filling the Rx: [id number] [place], [place] - [address] [place] [facility] [place] [address]  Filling Pharmacy Phone: [phone]  Filling Pharmacy Fax: [phone]  Start Date: 12/11/2020

## 2020-12-11 NOTE — TELEPHONE ENCOUNTER
Prior Authorization Retail Medication Request    Medication/Dose: Continuous Blood Gluc Sensor (FREESTYLE JUNIOR 14 DAY SENSOR) MISC  ICD code (if different than what is on RX):  E11.21  Previously Tried and Failed:    Rationale:      Insurance Name:  Medicare  Insurance ID:  [id number]        Pharmacy Information (if different than what is on RX)  Name:  [facility]  Phone:  [phone]

## 2020-12-11 NOTE — TELEPHONE ENCOUNTER
Central Prior Authorization Team   Phone: [phone]      PRIOR AUTHORIZATION DENIED    Medication: Continuous Blood Gluc Sensor (FREESTYLE JUNIOR 14 DAY SENSOR) MISC - DENIED    Denial Date: 12/11/2020    Denial Rational:       Appeal Information:

## 2020-12-16 DIAGNOSIS — E11.21 TYPE 2 DIABETES MELLITUS WITH DIABETIC NEPHROPATHY, WITHOUT LONG-TERM CURRENT USE OF INSULIN (H): Primary | ICD-10-CM

## 2020-12-18 ENCOUNTER — TELEPHONE (OUTPATIENT)
Dept: FAMILY MEDICINE | Facility: CLINIC | Age: 70
End: 2020-12-18

## 2020-12-18 DIAGNOSIS — N52.9 ERECTILE DYSFUNCTION, UNSPECIFIED ERECTILE DYSFUNCTION TYPE: Primary | ICD-10-CM

## 2020-12-18 DIAGNOSIS — N52.9 ERECTILE DYSFUNCTION, UNSPECIFIED ERECTILE DYSFUNCTION TYPE: ICD-10-CM

## 2020-12-18 RX ORDER — SILDENAFIL 50 MG/1
50-100 TABLET, FILM COATED ORAL DAILY PRN
Qty: 6 TABLET | Refills: 0 | Status: CANCELLED | OUTPATIENT
Start: 2020-12-18

## 2020-12-18 RX ORDER — SILDENAFIL CITRATE 20 MG/1
TABLET ORAL
Qty: 30 TABLET | Refills: 0 | Status: SHIPPED | OUTPATIENT
Start: 2020-12-18 | End: 2021-07-21 | Stop reason: ALTCHOICE

## 2020-12-18 NOTE — TELEPHONE ENCOUNTER
PCP per patient MyChart message:    The prescription for 6 Viagra (generic) is close to $300.00. The pharmacist suggested a prescription for 20mg tablets and take 2 or 3 or? The price is 30 tablets for $15.00.  Would you submit a prescription for those to the [facility] on [address]?

## 2020-12-21 ENCOUNTER — TELEPHONE (OUTPATIENT)
Dept: FAMILY MEDICINE | Facility: CLINIC | Age: 70
End: 2020-12-21

## 2020-12-22 NOTE — TELEPHONE ENCOUNTER
"Good afternoon doctor Di!    Unfortunately Medicare B will not pay for the Freestyle Ross system because the patient is not on insulin. I did call the patient and he is aware- I also informed him that he has the option to pay out of pocket for the items if he would like. I encouraged him that if he chooses to pay out of pocket to price quote at other companies because the \"cash price\" of the item can vary and I want to ensure if he is paying out of pocket that he is getting them as cheap as possible. I did give him the cash price figures through [employer]. If there is any change in insurance please let us know and we would be happy to look into the coverage criteria!    Thanks!    "

## 2021-01-15 ENCOUNTER — HEALTH MAINTENANCE LETTER (OUTPATIENT)
Age: 71
End: 2021-01-15

## 2021-02-23 ENCOUNTER — MYC MEDICAL ADVICE (OUTPATIENT)
Dept: INTERNAL MEDICINE | Facility: CLINIC | Age: 71
End: 2021-02-23

## 2021-03-19 DIAGNOSIS — E78.5 HYPERLIPIDEMIA LDL GOAL <70: ICD-10-CM

## 2021-03-19 DIAGNOSIS — I10 HYPERTENSION GOAL BP (BLOOD PRESSURE) < 140/90: ICD-10-CM

## 2021-03-19 DIAGNOSIS — E11.21 TYPE 2 DIABETES MELLITUS WITH DIABETIC NEPHROPATHY, WITHOUT LONG-TERM CURRENT USE OF INSULIN (H): ICD-10-CM

## 2021-03-19 LAB
ALT SERPL W P-5'-P-CCNC: 21 U/L (ref 0–70)
CREAT UR-MCNC: 25 MG/DL
HBA1C MFR BLD: 6.8 % (ref 0–5.6)
MICROALBUMIN UR-MCNC: 7 MG/L
MICROALBUMIN/CREAT UR: 28.21 MG/G CR (ref 0–17)

## 2021-03-19 PROCEDURE — 82043 UR ALBUMIN QUANTITATIVE: CPT | Performed by: FAMILY MEDICINE

## 2021-03-19 PROCEDURE — 83036 HEMOGLOBIN GLYCOSYLATED A1C: CPT | Performed by: FAMILY MEDICINE

## 2021-03-19 PROCEDURE — 84460 ALANINE AMINO (ALT) (SGPT): CPT | Performed by: FAMILY MEDICINE

## 2021-03-19 PROCEDURE — 36415 COLL VENOUS BLD VENIPUNCTURE: CPT | Performed by: FAMILY MEDICINE

## 2021-06-22 ENCOUNTER — MYC MEDICAL ADVICE (OUTPATIENT)
Dept: INTERNAL MEDICINE | Facility: CLINIC | Age: 71
End: 2021-06-22

## 2021-06-22 DIAGNOSIS — H91.13 PRESBYCUSIS, BILATERAL: Primary | ICD-10-CM

## 2021-07-12 ENCOUNTER — OFFICE VISIT (OUTPATIENT)
Dept: INTERNAL MEDICINE | Facility: CLINIC | Age: 71
End: 2021-07-12
Payer: MEDICARE

## 2021-07-12 VITALS
WEIGHT: 194 LBS | HEART RATE: 57 BPM | RESPIRATION RATE: 16 BRPM | BODY MASS INDEX: 29.4 KG/M2 | OXYGEN SATURATION: 100 % | HEIGHT: 68 IN | SYSTOLIC BLOOD PRESSURE: 124 MMHG | DIASTOLIC BLOOD PRESSURE: 72 MMHG | TEMPERATURE: 97.7 F

## 2021-07-12 DIAGNOSIS — D64.9 ANEMIA, UNSPECIFIED TYPE: ICD-10-CM

## 2021-07-12 DIAGNOSIS — I10 ESSENTIAL HYPERTENSION: ICD-10-CM

## 2021-07-12 DIAGNOSIS — R80.9 MICROALBUMINURIA: ICD-10-CM

## 2021-07-12 DIAGNOSIS — N52.9 ERECTILE DYSFUNCTION, UNSPECIFIED ERECTILE DYSFUNCTION TYPE: ICD-10-CM

## 2021-07-12 DIAGNOSIS — E78.5 HYPERLIPIDEMIA LDL GOAL <70: ICD-10-CM

## 2021-07-12 DIAGNOSIS — Z12.11 SPECIAL SCREENING FOR MALIGNANT NEOPLASMS, COLON: ICD-10-CM

## 2021-07-12 DIAGNOSIS — I25.10 CORONARY ARTERY DISEASE INVOLVING NATIVE CORONARY ARTERY OF NATIVE HEART WITHOUT ANGINA PECTORIS: ICD-10-CM

## 2021-07-12 DIAGNOSIS — E11.21 TYPE 2 DIABETES MELLITUS WITH DIABETIC NEPHROPATHY, WITHOUT LONG-TERM CURRENT USE OF INSULIN (H): ICD-10-CM

## 2021-07-12 LAB
ALBUMIN SERPL-MCNC: 4.3 G/DL (ref 3.4–5)
ALP SERPL-CCNC: 51 U/L (ref 40–150)
ALT SERPL W P-5'-P-CCNC: 27 U/L (ref 0–70)
ANION GAP SERPL CALCULATED.3IONS-SCNC: 3 MMOL/L (ref 3–14)
AST SERPL W P-5'-P-CCNC: 19 U/L (ref 0–45)
BILIRUB SERPL-MCNC: 0.6 MG/DL (ref 0.2–1.3)
BUN SERPL-MCNC: 17 MG/DL (ref 7–30)
CALCIUM SERPL-MCNC: 9 MG/DL (ref 8.5–10.1)
CHLORIDE BLD-SCNC: 102 MMOL/L (ref 94–109)
CHOLEST SERPL-MCNC: 131 MG/DL
CO2 SERPL-SCNC: 30 MMOL/L (ref 20–32)
CREAT SERPL-MCNC: 0.86 MG/DL (ref 0.66–1.25)
CREAT UR-MCNC: 25 MG/DL
ERYTHROCYTE [DISTWIDTH] IN BLOOD BY AUTOMATED COUNT: 13.8 % (ref 10–15)
FASTING STATUS PATIENT QL REPORTED: YES
GFR SERPL CREATININE-BSD FRML MDRD: 88 ML/MIN/1.73M2
GLUCOSE BLD-MCNC: 191 MG/DL (ref 70–99)
HBA1C MFR BLD: 7.5 % (ref 0–5.6)
HCT VFR BLD AUTO: 41.1 % (ref 40–53)
HDLC SERPL-MCNC: 79 MG/DL
HGB BLD-MCNC: 13.7 G/DL (ref 13.3–17.7)
IRON SATN MFR SERPL: 44 % (ref 15–46)
IRON SERPL-MCNC: 141 UG/DL (ref 35–180)
LDLC SERPL CALC-MCNC: 42 MG/DL
MCH RBC QN AUTO: 30.3 PG (ref 26.5–33)
MCHC RBC AUTO-ENTMCNC: 33.3 G/DL (ref 31.5–36.5)
MCV RBC AUTO: 91 FL (ref 78–100)
MICROALBUMIN UR-MCNC: <5 MG/DL
MICROALBUMIN/CREAT UR: NORMAL MG/G{CREAT}
NONHDLC SERPL-MCNC: 52 MG/DL
PLATELET # BLD AUTO: 197 10E3/UL (ref 150–450)
POTASSIUM BLD-SCNC: 4.7 MMOL/L (ref 3.4–5.3)
PROT SERPL-MCNC: 7.8 G/DL (ref 6.8–8.8)
RBC # BLD AUTO: 4.52 10E6/UL (ref 4.4–5.9)
SODIUM SERPL-SCNC: 135 MMOL/L (ref 133–144)
TIBC SERPL-MCNC: 324 UG/DL (ref 240–430)
TRIGL SERPL-MCNC: 51 MG/DL
WBC # BLD AUTO: 6.3 10E3/UL (ref 4–11)

## 2021-07-12 PROCEDURE — 80061 LIPID PANEL: CPT | Performed by: INTERNAL MEDICINE

## 2021-07-12 PROCEDURE — 99214 OFFICE O/P EST MOD 30 MIN: CPT | Performed by: INTERNAL MEDICINE

## 2021-07-12 PROCEDURE — 36415 COLL VENOUS BLD VENIPUNCTURE: CPT | Performed by: INTERNAL MEDICINE

## 2021-07-12 PROCEDURE — 83550 IRON BINDING TEST: CPT | Performed by: INTERNAL MEDICINE

## 2021-07-12 PROCEDURE — 85027 COMPLETE CBC AUTOMATED: CPT | Performed by: INTERNAL MEDICINE

## 2021-07-12 PROCEDURE — 82043 UR ALBUMIN QUANTITATIVE: CPT | Performed by: INTERNAL MEDICINE

## 2021-07-12 PROCEDURE — 99207 PR FOOT EXAM NO CHARGE: CPT | Mod: 25 | Performed by: INTERNAL MEDICINE

## 2021-07-12 PROCEDURE — 83036 HEMOGLOBIN GLYCOSYLATED A1C: CPT | Performed by: INTERNAL MEDICINE

## 2021-07-12 PROCEDURE — 80053 COMPREHEN METABOLIC PANEL: CPT | Performed by: INTERNAL MEDICINE

## 2021-07-12 RX ORDER — SILDENAFIL 100 MG/1
100 TABLET, FILM COATED ORAL DAILY PRN
Qty: 30 TABLET | Refills: 3 | Status: SHIPPED | OUTPATIENT
Start: 2021-07-12 | End: 2022-02-17

## 2021-07-12 ASSESSMENT — MIFFLIN-ST. JEOR: SCORE: 1619.48

## 2021-07-12 NOTE — PROGRESS NOTES
ASSESSMENT:   1. Type 2 diabetes mellitus with diabetic nephropathy, without long-term current use of insulin (H)  Previously controlled.  Continue current medications for now.  Labs as ordered.  If blood sugars worsen, then possible use of Jardiance for diabetic control and given history of CAD.  Due for eye exam  - Hemoglobin A1c; Future  - Comprehensive metabolic panel; Future  - Albumin Random Urine Quantitative with Creat Ratio; Future  - Hemoglobin A1c  - Comprehensive metabolic panel  - Albumin Random Urine Quantitative with Creat Ratio  - FOOT EXAM    2. Coronary artery disease involving native coronary artery of native heart without angina pectoris  Currently asymptomatic.  Continue current medical management    3. Essential hypertension  Controlled.  Continue current medication  - losartan (COZAAR) 100 MG tablet; Take 1 tablet (100 mg) by mouth daily  Dispense: 90 tablet; Refill: 3    4. Hyperlipidemia LDL goal <70  On statin therapy.  Continue current medication.  Lab as ordered  - Comprehensive metabolic panel; Future  - Lipid panel reflex to direct LDL Fasting; Future  - Comprehensive metabolic panel  - Lipid panel reflex to direct LDL Fasting  - atorvastatin (LIPITOR) 40 MG tablet; TAKE 1 TABLET EVERY DAY  Dispense: 90 tablet; Refill: 3    5. Anemia, unspecified type  Mild previous anemia.  Patient denies seeing blood in stools.  Guaiac negative today.  Labs as ordered  - CBC with platelets; Future  - Iron & Iron Binding Capacity; Future  - CBC with platelets  - Iron & Iron Binding Capacity    6. Erectile dysfunction, unspecified erectile dysfunction type  No use of nitroglycerin.  No chest pain with exertion.  Okay for Viagra to use as needed rather than 20 mg sildenafil tablets  - sildenafil (VIAGRA) 100 MG tablet; Take 1 tablet (100 mg) by mouth daily as needed (erectile dysfunction)  Dispense: 30 tablet; Refill: 3    7. Special screening for malignant neoplasms, colon  Due for colon cancer  screening  - Adult Gastro Ref - Procedure Only; Future    8. Microalbuminuria  Previous mild proteinuria.  On full dose ARB.  Due for lab follow-up  - Albumin Random Urine Quantitative with Creat Ratio; Future        PLAN:  Labs today as ordered  Vaccinations: Tetanus (Td) vaccine - get at pharmacy   Stop Sildenafil 20mg tabs.  Viagra 100mg tab. The dose of this to start is 50mg (1/2 tab)  taken as needed with sexual activity. Do not take more than once a day.  If not effective at the 50mg dose after a few tries, you may increase the medication to 100mg (1 tab) as needed with sexual activity (again, do not exceed use more than once a day). The medication is taken within 1/2-2 hours before sexual activity. Possible side effects include heartburn, headache, lightheadedness, vision changes. Make sure that you have had some fluids to drink  In the few hours prior to use so that you are not dehydrated as that can increase the risk for headaches and lightheadedness. Avoid alcohol when taking the medication. Never take Viagra if you are taking nitroglycerin medication. Stop if any vision changes   Schedule an eye appointment\  Screening colonoscopy  with  [facility]. Call  them at [phone] to schedule the procedure.   Hold Aspirin 1 week prior to the colonsocopy  Check with insurance or speak with your pharmacist re: Shingrix vaccine coverage for shingles prevention.  This is a 2 shot series done 2-6 months apart   Try to stop vaping with increasing risk for worsened heart disease and reduced blood flow to hands and feet  Possible change  Glimepiride to Jardiance but will await lab tests today and also talk with pharmacy regarding medication cost  Continue current meds for now           (Chart documentation was completed, in part, with [employer] voice-recognition software. Even though reviewed, some grammatical, spelling, and word errors may remain.)    Markel Kauffman MD  Internal Medicine Department  [facility]  "[facility] APRIL Call is a 70 year old who presents for the following health issues     HPI     Chief Complaint   Patient presents with     Establish Care     was under the care of Dr. Gavin Sevilla      Most recent lab results reviewed with pt.      Denies CP, SOB, abdominal pain, polyuria, polydipsia, vision changes,  or skin problems.   Not checking sugars for 6 mos   Due for eye exam  Due for colonoscopy with hx polyp   Vaping daily. No cigarette use   Due for Td vaccine   Has tried to get CGM but not covered by usual pharmacy insurance  Playing softball 6x/week  Using Sildenafil 80mg per sex episode for ED   Slight tingling in toes bilaterally and slight numbness occ. No sx now.  Denies back pain       Additional ROS:   Constitutional, HEENT, Cardiovascular, Pulmonary, GI and , Neuro, MSK and Psych review of systems/symptoms are otherwise negative or unchanged from previous, except as noted above.      OBJECTIVE:  /72   Pulse 57   Temp 97.7  F (36.5  C) (Temporal)   Resp 16   Ht 1.727 m (5' 8\")   Wt 88 kg (194 lb)   SpO2 100%   BMI 29.50 kg/m     Estimated body mass index is 29.5 kg/m  as calculated from the following:    Height as of this encounter: 1.727 m (5' 8\").    Weight as of this encounter: 88 kg (194 lb).     HENT: ear canals and TM's normal and nose and mouth without ulcers or lesions   Neck: no adenopathy. Thyroid normal to palpation. No bruits  Pulm: Lungs clear to auscultation   CV: Regular rates and rhythm  GI: Soft, nontender, Normal active bowel sounds, No hepatosplenomegaly or masses palpable  Ext: Peripheral pulses intact. No edema.  Neuro: Normal strength and tone, sensory exam grossly normal              "

## 2021-07-12 NOTE — PATIENT INSTRUCTIONS
Labs today as ordered  Vaccinations: Tetanus (Td) vaccine - get at pharmacy   Stop Sildenafil 20mg tabs.  Viagra 100mg tab. The dose of this to start is 50mg (1/2 tab)  taken as needed with sexual activity. Do not take more than once a day.  If not effective at the 50mg dose after a few tries, you may increase the medication to 100mg (1 tab) as needed with sexual activity (again, do not exceed use more than once a day). The medication is taken within 1/2-2 hours before sexual activity. Possible side effects include heartburn, headache, lightheadedness, vision changes. Make sure that you have had some fluids to drink  In the few hours prior to use so that you are not dehydrated as that can increase the risk for headaches and lightheadedness. Avoid alcohol when taking the medication. Never take Viagra if you are taking nitroglycerin medication. Stop if any vision changes     Screening colonoscopy  with  [facility]. Call  them at [phone] to schedule the procedure.   Hold Aspirin 1 week prior to the colonsocopy  Check with insurance or speak with your pharmacist re: Shingrix vaccine coverage for shingles prevention.  This is a 2 shot series done 2-6 months apart   Try to stop vaping with increasing risk for worsened heart disease and reduced blood flow to hands and feet   Probable change  Glimepiride to Jardiance but will await lab tests today and also talk with pharmacy regarding medication cost  Continue current meds for now

## 2021-07-20 ENCOUNTER — MYC MEDICAL ADVICE (OUTPATIENT)
Dept: INTERNAL MEDICINE | Facility: CLINIC | Age: 71
End: 2021-07-20

## 2021-07-20 DIAGNOSIS — E11.21 TYPE 2 DIABETES MELLITUS WITH DIABETIC NEPHROPATHY, WITHOUT LONG-TERM CURRENT USE OF INSULIN (H): Primary | ICD-10-CM

## 2021-07-21 DIAGNOSIS — I10 ESSENTIAL HYPERTENSION WITH GOAL BLOOD PRESSURE LESS THAN 140/90: ICD-10-CM

## 2021-07-21 DIAGNOSIS — E11.21 TYPE 2 DIABETES MELLITUS WITH DIABETIC NEPHROPATHY, WITHOUT LONG-TERM CURRENT USE OF INSULIN (H): ICD-10-CM

## 2021-07-21 RX ORDER — GLIMEPIRIDE 2 MG/1
TABLET ORAL
Qty: 90 TABLET | Refills: 3 | Status: SHIPPED | OUTPATIENT
Start: 2021-07-21 | End: 2021-07-23

## 2021-07-21 NOTE — TELEPHONE ENCOUNTER
Dr. Kauffman,     Please review pt mychart message and advise.  Do not see a result note on this yet.     Mecca Ortiz, JORJE  [facility] RN Triage Team

## 2021-07-23 RX ORDER — GLIMEPIRIDE 2 MG/1
TABLET ORAL
Qty: 90 TABLET | Refills: 3 | Status: SHIPPED | OUTPATIENT
Start: 2021-07-23 | End: 2022-02-17

## 2021-07-23 RX ORDER — AMLODIPINE BESYLATE 5 MG/1
TABLET ORAL
Qty: 90 TABLET | Refills: 1 | Status: SHIPPED | OUTPATIENT
Start: 2021-07-23 | End: 2022-02-17

## 2021-08-01 PROBLEM — D64.9 ANEMIA, UNSPECIFIED TYPE: Status: RESOLVED | Noted: 2021-07-12 | Resolved: 2021-08-01

## 2021-08-01 RX ORDER — LOSARTAN POTASSIUM 100 MG/1
100 TABLET ORAL DAILY
Qty: 90 TABLET | Refills: 3 | Status: ON HOLD | OUTPATIENT
Start: 2021-08-01 | End: 2022-02-15

## 2021-08-01 RX ORDER — ATORVASTATIN CALCIUM 40 MG/1
TABLET, FILM COATED ORAL
Qty: 90 TABLET | Refills: 3 | Status: SHIPPED | OUTPATIENT
Start: 2021-08-01 | End: 2022-07-25

## 2021-08-04 ENCOUNTER — TRANSFERRED RECORDS (OUTPATIENT)
Dept: HEALTH INFORMATION MANAGEMENT | Facility: CLINIC | Age: 71
End: 2021-08-04
Payer: MEDICARE

## 2021-08-04 LAB — RETINOPATHY: NORMAL

## 2021-08-10 ENCOUNTER — MYC MEDICAL ADVICE (OUTPATIENT)
Dept: INTERNAL MEDICINE | Facility: CLINIC | Age: 71
End: 2021-08-10

## 2021-08-10 DIAGNOSIS — E11.21 TYPE 2 DIABETES MELLITUS WITH DIABETIC NEPHROPATHY, WITHOUT LONG-TERM CURRENT USE OF INSULIN (H): Primary | ICD-10-CM

## 2021-08-11 NOTE — TELEPHONE ENCOUNTER
Please see Cubresa message and advise.    Thank you,    Roseann SORN BSN  [facility]  [phone]

## 2021-08-25 ENCOUNTER — TRANSFERRED RECORDS (OUTPATIENT)
Dept: HEALTH INFORMATION MANAGEMENT | Facility: CLINIC | Age: 71
End: 2021-08-25

## 2021-08-27 ENCOUNTER — TRANSFERRED RECORDS (OUTPATIENT)
Dept: HEALTH INFORMATION MANAGEMENT | Facility: CLINIC | Age: 71
End: 2021-08-27

## 2021-08-30 ENCOUNTER — TELEPHONE (OUTPATIENT)
Dept: INTERNAL MEDICINE | Facility: CLINIC | Age: 71
End: 2021-08-30

## 2021-08-30 ENCOUNTER — MYC MEDICAL ADVICE (OUTPATIENT)
Dept: INTERNAL MEDICINE | Facility: CLINIC | Age: 71
End: 2021-08-30

## 2021-08-30 NOTE — TELEPHONE ENCOUNTER
Patient Quality Outreach      Summary:    Patient has the following on his problem list/HM:     Diabetes    Last A1C:  Lab Results   Component Value Date    A1C 7.5 07/12/2021    A1C 6.8 03/19/2021    A1C 7.5 06/24/2020       Last LDL:    Lab Results   Component Value Date    LDL 42 07/12/2021    LDL 35 06/24/2020       Is the patient on a Statin? Yes          Is the patient on Aspirin? Yes    Medications     HMG CoA Reductase Inhibitors     atorvastatin (LIPITOR) 40 MG tablet       Salicylates     aspirin 81 MG tablet             Last three blood pressure readings:  BP Readings from Last 3 Encounters:   07/12/21 124/72   06/22/20 131/72   09/06/19 110/74            Tobacco Use      Smoking status: Current Some Day Smoker        Types: Cigars      Smokeless tobacco: Never Used      Tobacco comment: Now smoking an Ecig        IVD     ASA: Passed    Last LDL:    Lab Results   Component Value Date    CHOL 131 07/12/2021    CHOL 113 06/24/2020     Lab Results   Component Value Date    HDL 79 07/12/2021    HDL 69 06/24/2020     Lab Results   Component Value Date    LDL 42 07/12/2021    LDL 35 06/24/2020     Lab Results   Component Value Date    TRIG 51 07/12/2021    TRIG 44 06/24/2020        Lab Results   Component Value Date    CHOLHDLRATIO 1.9 11/16/2015        Is the patient on a Statin? Yes   Is the patient on Aspirin? Yes                  Medications     HMG CoA Reductase Inhibitors     atorvastatin (LIPITOR) 40 MG tablet       Salicylates     aspirin 81 MG tablet             Last three blood pressure readings:  BP Readings from Last 3 Encounters:   07/12/21 124/72   06/22/20 131/72   09/06/19 110/74        Tobacco History:       Tobacco Use      Smoking status: Current Some Day Smoker        Types: Cigars      Smokeless tobacco: Never Used      Tobacco comment: Now smoking an Ecig      Hypertension   Last three blood pressure readings:  BP Readings from Last 3 Encounters:   07/12/21 124/72   06/22/20 131/72    09/06/19 110/74     Blood pressure: Passed    HTN Guidelines:  ? 139/89     Patient is due/failing the following:   Eye Exam, Colonoscopy, Annual wellness, date due: 09/20/2020 and Immunizations    Type of outreach:    Sent [employer] message.    Questions for provider review:    None                                                                                                                                     Gayle Steen CMA      Chart routed to Care Team.

## 2021-09-04 ENCOUNTER — HEALTH MAINTENANCE LETTER (OUTPATIENT)
Age: 71
End: 2021-09-04

## 2021-09-16 ENCOUNTER — DOCUMENTATION ONLY (OUTPATIENT)
Dept: LAB | Facility: CLINIC | Age: 71
End: 2021-09-16

## 2021-09-16 DIAGNOSIS — Z12.5 SCREENING FOR PROSTATE CANCER: Primary | ICD-10-CM

## 2021-09-16 NOTE — PROGRESS NOTES
PATIENT COMING IN FOR LABS 09/24/2021. I PLACED HM ORDERS, BUT APPT NOTES INDICATES THE PATIENT IS WANTING A1C. PLEASE PLACE ORDER IF NEEDED. THANK YOU.

## 2021-09-23 ENCOUNTER — DOCUMENTATION ONLY (OUTPATIENT)
Dept: LAB | Facility: CLINIC | Age: 71
End: 2021-09-23

## 2021-09-23 DIAGNOSIS — E11.21 TYPE 2 DIABETES MELLITUS WITH DIABETIC NEPHROPATHY, WITHOUT LONG-TERM CURRENT USE OF INSULIN (H): Primary | ICD-10-CM

## 2021-09-23 NOTE — PROGRESS NOTES
Patient is due for nonfasting A1c starting 10/13/2021 or later.  Last A1c was drawn 7/12/2021 so cannot be drawn earlier than mid-October.  Lab order in chart.   Called patient and he will cancel lab appointment for tomorrow and reschedule for mid-October

## 2021-09-23 NOTE — PROGRESS NOTES
PATIENT COMING IN FOR LABS TOMORROW 09/24/2021. I PLACED HM ORDERS, BUT ACCORDING TO APPT NOTES THEY ARE WANTING THEIR A1C. PLEASE PLACE AND OR CONFIRM ORDERS. THANK YOU.

## 2021-09-30 ENCOUNTER — MEDICAL CORRESPONDENCE (OUTPATIENT)
Dept: HEALTH INFORMATION MANAGEMENT | Facility: CLINIC | Age: 71
End: 2021-09-30
Payer: MEDICARE

## 2021-10-13 ENCOUNTER — LAB (OUTPATIENT)
Dept: LAB | Facility: CLINIC | Age: 71
End: 2021-10-13
Payer: MEDICARE

## 2021-10-13 DIAGNOSIS — E11.21 TYPE 2 DIABETES MELLITUS WITH DIABETIC NEPHROPATHY, WITHOUT LONG-TERM CURRENT USE OF INSULIN (H): ICD-10-CM

## 2021-10-13 DIAGNOSIS — E78.5 HYPERLIPIDEMIA LDL GOAL <70: Primary | ICD-10-CM

## 2021-10-13 DIAGNOSIS — Z12.5 SCREENING FOR PROSTATE CANCER: ICD-10-CM

## 2021-10-13 LAB
HBA1C MFR BLD: 6.4 % (ref 0–5.6)
PSA SERPL-MCNC: 1.44 UG/L (ref 0–4)

## 2021-10-13 PROCEDURE — G0103 PSA SCREENING: HCPCS

## 2021-10-13 PROCEDURE — 83036 HEMOGLOBIN GLYCOSYLATED A1C: CPT

## 2021-10-13 PROCEDURE — 36415 COLL VENOUS BLD VENIPUNCTURE: CPT

## 2021-10-14 ENCOUNTER — MYC MEDICAL ADVICE (OUTPATIENT)
Dept: INTERNAL MEDICINE | Facility: CLINIC | Age: 71
End: 2021-10-14

## 2021-10-14 DIAGNOSIS — E11.21 TYPE 2 DIABETES MELLITUS WITH DIABETIC NEPHROPATHY, WITHOUT LONG-TERM CURRENT USE OF INSULIN (H): ICD-10-CM

## 2021-12-07 ENCOUNTER — TELEPHONE (OUTPATIENT)
Dept: INTERNAL MEDICINE | Facility: CLINIC | Age: 71
End: 2021-12-07
Payer: MEDICARE

## 2021-12-10 ASSESSMENT — ENCOUNTER SYMPTOMS
FEVER: 0
COUGH: 0
HEMATOCHEZIA: 0
PARESTHESIAS: 0
DIZZINESS: 0
FREQUENCY: 0
DIARRHEA: 1
NAUSEA: 0
HEMATURIA: 0
NERVOUS/ANXIOUS: 0
WEAKNESS: 0
DYSURIA: 0
SORE THROAT: 0
ARTHRALGIAS: 1
CONSTIPATION: 0
HEADACHES: 0
MYALGIAS: 0
EYE PAIN: 0
CHILLS: 0
JOINT SWELLING: 0
PALPITATIONS: 0
SHORTNESS OF BREATH: 0
ABDOMINAL PAIN: 0
HEARTBURN: 0

## 2021-12-10 ASSESSMENT — ACTIVITIES OF DAILY LIVING (ADL): CURRENT_FUNCTION: NO ASSISTANCE NEEDED

## 2021-12-13 ENCOUNTER — OFFICE VISIT (OUTPATIENT)
Dept: INTERNAL MEDICINE | Facility: CLINIC | Age: 71
End: 2021-12-13
Payer: MEDICARE

## 2021-12-13 VITALS
SYSTOLIC BLOOD PRESSURE: 124 MMHG | OXYGEN SATURATION: 100 % | WEIGHT: 199.8 LBS | HEART RATE: 62 BPM | BODY MASS INDEX: 30.28 KG/M2 | DIASTOLIC BLOOD PRESSURE: 70 MMHG | TEMPERATURE: 97.4 F | HEIGHT: 68 IN | RESPIRATION RATE: 14 BRPM

## 2021-12-13 DIAGNOSIS — R29.898 THUMB WEAKNESS: ICD-10-CM

## 2021-12-13 DIAGNOSIS — Z13.6 SCREENING FOR AAA (ABDOMINAL AORTIC ANEURYSM): ICD-10-CM

## 2021-12-13 DIAGNOSIS — E11.21 TYPE 2 DIABETES MELLITUS WITH DIABETIC NEPHROPATHY, WITHOUT LONG-TERM CURRENT USE OF INSULIN (H): ICD-10-CM

## 2021-12-13 DIAGNOSIS — Z00.00 MEDICARE ANNUAL WELLNESS VISIT, INITIAL: ICD-10-CM

## 2021-12-13 PROCEDURE — G0439 PPPS, SUBSEQ VISIT: HCPCS | Performed by: INTERNAL MEDICINE

## 2021-12-13 ASSESSMENT — ENCOUNTER SYMPTOMS
FEVER: 0
FREQUENCY: 0
MYALGIAS: 0
ABDOMINAL PAIN: 0
DIARRHEA: 1
ARTHRALGIAS: 1
HEADACHES: 0
SORE THROAT: 0
NERVOUS/ANXIOUS: 0
DIZZINESS: 0
DYSURIA: 0
SHORTNESS OF BREATH: 0
PALPITATIONS: 0
HEARTBURN: 0
CHILLS: 0
WEAKNESS: 0
HEMATURIA: 0
PARESTHESIAS: 0
JOINT SWELLING: 0
HEMATOCHEZIA: 0
NAUSEA: 0
CONSTIPATION: 0
EYE PAIN: 0
COUGH: 0

## 2021-12-13 ASSESSMENT — MIFFLIN-ST. JEOR: SCORE: 1640.79

## 2021-12-13 ASSESSMENT — ACTIVITIES OF DAILY LIVING (ADL): CURRENT_FUNCTION: NO ASSISTANCE NEEDED

## 2021-12-13 NOTE — PROGRESS NOTES
"SUBJECTIVE:   Jakub Robles is a 71 year old male who presents for Preventive Visit.  Established medical issues previously addressed with clinic visit July 2021 and my chart message update October 2021  Patient has been advised of split billing requirements and indicates understanding: Yes   Are you in the first 12 months of your Medicare coverage?  No    Healthy Habits:     In general, how would you rate your overall health?  Excellent    Frequency of exercise:  4-5 days/week    Duration of exercise:  45-60 minutes    Do you usually eat at least 4 servings of fruit and vegetables a day, include whole grains    & fiber and avoid regularly eating high fat or \"junk\" foods?  Yes    Taking medications regularly:  Yes    Medication side effects:  None    Ability to successfully perform activities of daily living:  No assistance needed    Home Safety:  No safety concerns identified    Hearing Impairment:  No hearing concerns    In the past 6 months, have you been bothered by leaking of urine?  No    In general, how would you rate your overall mental or emotional health?  Excellent      PHQ-2 Total Score: 0    Additional concerns today:  No  Diabetic eye exam- done Summer 2021, [facility]     Do you feel safe in your environment? Yes    Have you ever done Advance Care Planning? (For example, a Health Directive, POLST, or a discussion with a medical provider or your loved ones about your wishes): Yes, patient states has an Advance Care Planning document and will bring a copy to the clinic.       Fall risk  Fallen 2 or more times in the past year?: No  Any fall with injury in the past year?: No    Cognitive Screening   1) Repeat 3 items (Leader, Season, Table)    2) Clock draw: NORMAL  3) 3 item recall: Recalls 3 objects  Results: 3 items recalled: COGNITIVE IMPAIRMENT LESS LIKELY    Mini-CogTM Copyright PAUL Rivas. Licensed by the author for use in [facility]; reprinted with permission " (boni@[email or web address]). All rights reserved.          Reviewed and updated as needed this visit by clinical staff   Allergies              Reviewed and updated as needed this visit by Provider               Social History     Tobacco Use     Smoking status: Light Tobacco Smoker     Packs/day: 0.00     Years: 0.00     Pack years: 0.00     Types: Cigars     Start date: 1968     Last attempt to quit: 2001     Years since quittin.9     Smokeless tobacco: Never Used     Tobacco comment: I still vape   Substance Use Topics     Alcohol use: Yes     Comment: couple glasses wine/day     If you drink alcohol do you typically have >3 drinks per day or >7 drinks per week? Yes      Alcohol Use 12/10/2021   Prescreen: >3 drinks/day or >7 drinks/week? Yes   Prescreen: >3 drinks/day or >7 drinks/week? -   AUDIT SCORE  9     AUDIT - Alcohol Use Disorders Identification Test - Reproduced from the World Health Organization Audit 2001 (Second Edition) 12/10/2021   1.  How often do you have a drink containing alcohol? 4 or more times a week   2.  How many drinks containing alcohol do you have on a typical day when you are drinking? 1 or 2   3.  How often do you have five or more drinks on one occasion? Less than monthly   4.  How often during the last year have you found that you were not able to stop drinking once you had started? Never   5.  How often during the last year have you failed to do what was normally expected of you because of drinking? Never   6.  How often during the last year have you needed a first drink in the morning to get yourself going after a heavy drinking session? Never   7.  How often during the last year have you had a feeling of guilt or remorse after drinking? Never   8.  How often during the last year have you been unable to remember what happened the night before because of your drinking? Never   9.  Have you or someone else been injured because of your drinking? No   10. Has a relative, friend,  doctor or other health care worker been concerned about your drinking or suggested you cut down? Yes, during the last year   TOTAL SCORE 9             Current providers sharing in care for this patient include:   Patient Care Team:  Markel Kauffman MD as PCP - General (Internal Medicine)  Markel Kauffman MD as Assigned PCP    The following health maintenance items are reviewed in Epic and correct as of today:  Health Maintenance Due   Topic Date Due     LUNG CANCER SCREENING  Never done     AORTIC ANEURYSM SCREENING (SYSTEM ASSIGNED)  Never done     MEDICARE ANNUAL WELLNESS VISIT  09/06/2020     EYE EXAM  11/20/2020     DTAP/TDAP/TD IMMUNIZATION (2 - Td or Tdap) 06/27/2021     Labs reviewed in EPIC      Component      Latest Ref Rng & Units 7/12/2021 10/13/2021   Sodium      133 - 144 mmol/L 135    Potassium      3.4 - 5.3 mmol/L 4.7    Chloride      94 - 109 mmol/L 102    Carbon Dioxide      20 - 32 mmol/L 30    Anion Gap      3 - 14 mmol/L 3    Urea Nitrogen      7 - 30 mg/dL 17    Creatinine      0.66 - 1.25 mg/dL 0.86    Calcium      8.5 - 10.1 mg/dL 9.0    Glucose      70 - 99 mg/dL 191 (H)    Alkaline Phosphatase      40 - 150 U/L 51    AST      0 - 45 U/L 19    ALT      0 - 70 U/L 27    Protein Total      6.8 - 8.8 g/dL 7.8    Albumin      3.4 - 5.0 g/dL 4.3    Bilirubin Total      0.2 - 1.3 mg/dL 0.6    GFR Estimate      >60 mL/min/1.73m2 88    WBC      4.0 - 11.0 10e3/uL 6.3    RBC Count      4.40 - 5.90 10e6/uL 4.52    Hemoglobin      13.3 - 17.7 g/dL 13.7    Hematocrit      40.0 - 53.0 % 41.1    MCV      78 - 100 fL 91    MCH      26.5 - 33.0 pg 30.3    MCHC      31.5 - 36.5 g/dL 33.3    RDW      10.0 - 15.0 % 13.8    Platelet Count      150 - 450 10e3/uL 197    Cholesterol      <200 mg/dL 131    Triglycerides      <150 mg/dL 51    HDL Cholesterol      >=40 mg/dL 79    LDL Cholesterol Calculated      <=100 mg/dL 42    Non HDL Cholesterol      <130 mg/dL 52    Patient Fasting > 8hrs?       Yes    Iron      35  "- 180 ug/dL 141    Iron Binding Cap      240 - 430 ug/dL 324    Iron Saturation Index      15 - 46 % 44    Creatinine Urine      mg/dL 25    Albumin Urine mg/L      mg/dL <5    Albumin Urine mg/g Cr           Hemoglobin A1C      0.0 - 5.6 % 7.5 (H) 6.4 (H)   PSA      0.00 - 4.00 ug/L  1.44             Review of Systems   Constitutional: Negative for chills and fever.   HENT: Negative for congestion, ear pain, hearing loss and sore throat.    Eyes: Negative for pain and visual disturbance.   Respiratory: Negative for cough and shortness of breath.    Cardiovascular: Negative for chest pain, palpitations and peripheral edema.   Gastrointestinal: Positive for diarrhea. Negative for abdominal pain, constipation, heartburn, hematochezia and nausea.   Genitourinary: Positive for impotence. Negative for dysuria, frequency, genital sores, hematuria, penile discharge and urgency.   Musculoskeletal: Positive for arthralgias. Negative for joint swelling and myalgias.   Skin: Positive for rash.   Neurological: Negative for dizziness, weakness, headaches and paresthesias.   Psychiatric/Behavioral: Negative for mood changes. The patient is not nervous/anxious.       BMs looser  with Metformin. Formed in AM and loser later part of the day. No blood   ED controlled with prn Viagra   Has chronic arthritis left hand with prior sprains (Saw [facility]  Feb 2020)  Left 4th finger and now having trouble with extension of the distal left thumb joint  Rash\" all my life\" on general skin with dryness of the skin and slight irritation response to  topical therapy with  OTC steroids and lotion.  Mostly in winter.  No symptoms now with treatment    OBJECTIVE:   /70   Pulse 62   Temp 97.4  F (36.3  C) (Temporal)   Resp 14   Ht 1.727 m (5' 8\")   Wt 90.6 kg (199 lb 12.8 oz)   SpO2 100%   BMI 30.38 kg/m   Estimated body mass index is 30.38 kg/m  as calculated from the following:    Height as of this encounter: 1.727 m (5' 8\").    Weight " as of this encounter: 90.6 kg (199 lb 12.8 oz).  Physical Exam  General appearance -   alert, no distress  Skin - No rashes or lesions.  Minimal dryness of the skin distal bilateral lower extremity  Head - normocephalic, atraumatic  Eyes - PARDEEP, EOMI, fundi exam with nondilated pupils negative.  Ears - External ears normal. Canals clear. TM's normal.  Nose/Sinuses - Nares normal. Septum midline. Mucosa normal. No drainage or sinus tenderness.  Oropharynx - No erythema, no adenopathy, no exudates.  Neck - Supple without adenopathy or thyromegaly. No bruits.  Lungs - Clear to auscultation without wheezes/rhonchi.  Heart - Regular rate and rhythm without murmurs, clicks, or gallops.  Nodes - No supraclavicular, axillary, or inguinal adenopathy palpable.  Abdomen - Abdomen mildly obese, soft, non-tender. BS normal. No masses or hepatosplenomegaly palpable. No bruits.  Extremities -No cyanosis, clubbing or edema.    Musculoskeletal - Spine ROM normal. Muscular strength intact exceot left thumb distal joint loss of extension strength against resistance. DIIP and PIP joints of hands  bilaterally with osteoarthritis changes  Peripheral pulses - radial=4/4, femoral=4/4, posterior tibial=4/4, dorsalis pedis=4/4,  Neuro - Gait normal. Reflexes normal and symmetric. Sensation grossly WNL.  Genital - Normal-appearing male external genitalia. No scrotal masses or inguinal hernia palpable.   Rectal - Guaic negative stool. Normal tone. Prostate normal in size to palpation. No rectal masses or prostate nodularity palpable          ASSESSMENT / PLAN:   1. Medicare annual wellness visit, initial  See below for AAA screening.  Healthcare maintenance otherwise up-to-date.  Counts regarding reducing alcohol intake to 1 glass of wine per day or less to lower carbohydrate intake and help with weight reduction.  Counseled to discontinue use of cigars/tobacco    2. Screening for AAA (abdominal aortic aneurysm)   Family history AAA and  "personal history of hypertension.  Will therefore do screening  - HC US ABD AORTA SCREEN; Future    3. Thumb weakness  Extensor tendon weakness which is chronic left distal thumb directed patient does have osteoarthritis changes in the joints.  Patient to f/u with [facility] who he has seen previously for left hand issues and your x-rays of previously been done    4. Type 2 diabetes mellitus with diabetic nephropathy, without long-term current use of insulin (H)  Controlled.  Occasional loose stool with Metformin.  Carbs-alcohol reduction as above.  Not candidate for recently Jardiance due to medication cost.  Future labs as previously ordered and discussed with previous encounters      Patient has been advised of split billing requirements and indicates understanding: Yes but established med issues addressed with other encounters recently or being deferred to specialists so no additional billing submitted      COUNSELING:  Reviewed preventive health counseling, as reflected in patient instructions    Estimated body mass index is 29.5 kg/m  as calculated from the following:    Height as of 7/12/21: 1.727 m (5' 8\").    Weight as of 7/12/21: 88 kg (194 lb).        He reports that he has been smoking cigars. He started smoking about 53 years ago. He has been smoking about 0.00 packs per day for the past 0.00 years. He has never used smokeless tobacco.  Tobacco Cessation Action Plan:   See plan discussion below for counseling      Appropriate preventive services were discussed with this patient, including applicable screening as appropriate for cardiovascular disease, diabetes, osteopenia/osteoporosis, and glaucoma.  As appropriate for age/gender, discussed screening for colorectal cancer, prostate cancer, breast cancer, and cervical cancer. Checklist reviewing preventive services available has been given to the patient.    Reviewed patients plan of care and provided an AVS. The Basic Care Plan (routine screening as documented " in Health Maintenance) for Jakub meets the Care Plan requirement. This Care Plan has been established and reviewed with the Patient.       PLAN:  Continue current meds  Reduce alcohol intake by half and carb intake. If loose stools persists,  then reduce Metformin to 1000mg tab, 1/2 tab (500mg) twice  A day)  Call  [phone] or use [facility] to schedule a future lab appointment  fasting in  mid/late April 2022 week.   For fasting labs, please refrain from eating for 8 hours or more.   Drink 2 glasses of water before your lab appointment. It is fine to take your  oral medications on the morning of the lab test as usual  Abdominal ultrasound of the aorta for screening.      [facility] will call you to schedule. If you have not heard from their schedulers within 2 business days, then call [phone] (radiology scheduling)  Discontinue all use of cigars/tobacco given increased risk for worsened vascular disease, lung issues, etc.  Schedule follow-up appointment with [facility] regarding left thumb tendon weakness (Dr Alexandre) [phone]      Markel Kauffman MD  [facility]

## 2021-12-17 ENCOUNTER — TELEPHONE (OUTPATIENT)
Dept: INTERNAL MEDICINE | Facility: CLINIC | Age: 71
End: 2021-12-17
Payer: MEDICARE

## 2021-12-17 NOTE — TELEPHONE ENCOUNTER
----- Message from Markel Kauffman MD sent at 12/13/2021  8:31 AM CST -----  Regarding: Eye exam report  Please elie eye rep[ort from [facility] done Summer 2021 and have them fax to us

## 2021-12-22 ENCOUNTER — TELEPHONE (OUTPATIENT)
Dept: INTERNAL MEDICINE | Facility: CLINIC | Age: 71
End: 2021-12-22
Payer: MEDICARE

## 2021-12-22 NOTE — TELEPHONE ENCOUNTER
Anju calling in from [employer] in regards to some forms she faxed over regards insulin injecting times and sliding scale frequent adjustments.     Nurse unable to locate forms    Message left on Jeffrey voicemail to refax forms that need to be completed. Advised to fax to [phone]    Fax number to [facility] [employer]  [phone]    Padma Sams RN

## 2021-12-28 NOTE — TELEPHONE ENCOUNTER
Anju calling back from [employer]. She states that she did not receive message below. She agrees to refax form. Lori Morales RN

## 2021-12-31 NOTE — TELEPHONE ENCOUNTER
The patient is not on insulin therapy.  He has only on oral medication.  When I initially submitted the request for the freestyle gwen CGM, I was not aware by Medicare rules that he would require to be on insulin.  The form that was submitted  (see  correspondence 9/30/2021 ) did not state that the patient was on insulin and once I learned about the tighter Medicare restriction, I had told the patient then  that he would not be able to use the CGM since he had Medicare insurance and Medicare policy required the use of insulin 3 or more times a day plus  making adjustments to his insulin dosing based on blood sugar readings.  My assumption then was that the patient would not be able to qualify for the CGM.  However, the patient then contacted us stating that he had already been sent the CGM by [employer] and that he had been approved to use it so I assumed his secondary insurance was covering it instead.  I have sent a [facility] message to the patient asking him to record recent blood sugars but I cannot document his use of insulin because he is not taking insulin at this time.  Please speak with Anju to find out if this means that patient cannot be using the CGM and that there has been some error on the part of [employer]  In sending the patient the CGM  Or what needs to be done now

## 2022-01-11 NOTE — TELEPHONE ENCOUNTER
Spoke with patient's wife, Esmer, she states they understand that Patient cannot get CGM due to not being on Insulin, but is requesting that [facility] send a DENIAL to us and them in  Writing stating stating that fact. So that they can try to appeal to medicare and we can have a copy for our insurance.   I will contact Anju at [facility] and get information that is being requested.

## 2022-02-03 ENCOUNTER — APPOINTMENT (OUTPATIENT)
Dept: CT IMAGING | Facility: CLINIC | Age: 72
DRG: 377 | End: 2022-02-03
Attending: EMERGENCY MEDICINE
Payer: MEDICARE

## 2022-02-03 ENCOUNTER — HOSPITAL ENCOUNTER (OUTPATIENT)
Facility: CLINIC | Age: 72
Setting detail: OBSERVATION
Discharge: HOME | DRG: 377 | End: 2022-02-04
Attending: EMERGENCY MEDICINE | Admitting: INTERNAL MEDICINE
Payer: MEDICARE

## 2022-02-03 DIAGNOSIS — K92.2 GASTROINTESTINAL HEMORRHAGE, UNSPECIFIED GASTROINTESTINAL HEMORRHAGE TYPE: ICD-10-CM

## 2022-02-03 DIAGNOSIS — I25.10 CORONARY ARTERY DISEASE INVOLVING NATIVE CORONARY ARTERY OF NATIVE HEART WITHOUT ANGINA PECTORIS: Primary | ICD-10-CM

## 2022-02-03 DIAGNOSIS — I71.43 ANEURYSM OF INFRARENAL ABDOMINAL AORTA (H): ICD-10-CM

## 2022-02-03 DIAGNOSIS — K29.81 DUODENITIS WITH BLEEDING: ICD-10-CM

## 2022-02-03 DIAGNOSIS — R55 NEAR SYNCOPE: ICD-10-CM

## 2022-02-03 DIAGNOSIS — D50.0 NORMOCYTIC ANEMIA DUE TO BLOOD LOSS: ICD-10-CM

## 2022-02-03 DIAGNOSIS — R52 PAIN: ICD-10-CM

## 2022-02-03 DIAGNOSIS — D62 ACUTE BLOOD LOSS ANEMIA: ICD-10-CM

## 2022-02-03 LAB
ABO/RH(D): NORMAL
ALBUMIN SERPL-MCNC: 3.4 G/DL (ref 3.4–5)
ALP SERPL-CCNC: 42 U/L (ref 40–150)
ALT SERPL W P-5'-P-CCNC: 21 U/L (ref 0–70)
ANION GAP SERPL CALCULATED.3IONS-SCNC: 3 MMOL/L (ref 3–14)
ANTIBODY SCREEN: NEGATIVE
APTT PPP: 25 SECONDS (ref 22–38)
AST SERPL W P-5'-P-CCNC: 11 U/L (ref 0–45)
BASOPHILS # BLD AUTO: 0.1 10E3/UL (ref 0–0.2)
BASOPHILS NFR BLD AUTO: 0 %
BILIRUB SERPL-MCNC: 0.3 MG/DL (ref 0.2–1.3)
BUN SERPL-MCNC: 31 MG/DL (ref 7–30)
CALCIUM SERPL-MCNC: 8.2 MG/DL (ref 8.5–10.1)
CHLORIDE BLD-SCNC: 103 MMOL/L (ref 94–109)
CO2 SERPL-SCNC: 27 MMOL/L (ref 20–32)
CREAT SERPL-MCNC: 0.8 MG/DL (ref 0.66–1.25)
EOSINOPHIL # BLD AUTO: 0.2 10E3/UL (ref 0–0.7)
EOSINOPHIL NFR BLD AUTO: 2 %
ERYTHROCYTE [DISTWIDTH] IN BLOOD BY AUTOMATED COUNT: 13.4 % (ref 10–15)
GFR SERPL CREATININE-BSD FRML MDRD: >90 ML/MIN/1.73M2
GLUCOSE BLD-MCNC: 282 MG/DL (ref 70–99)
GLUCOSE BLDC GLUCOMTR-MCNC: 136 MG/DL (ref 70–99)
GLUCOSE BLDC GLUCOMTR-MCNC: 258 MG/DL (ref 70–99)
GLUCOSE BLDC GLUCOMTR-MCNC: 271 MG/DL (ref 70–99)
HCT VFR BLD AUTO: 28.6 % (ref 40–53)
HGB BLD-MCNC: 7.3 G/DL (ref 13.3–17.7)
HGB BLD-MCNC: 8.3 G/DL (ref 13.3–17.7)
HGB BLD-MCNC: 9.3 G/DL (ref 13.3–17.7)
IMM GRANULOCYTES # BLD: 0 10E3/UL
IMM GRANULOCYTES NFR BLD: 0 %
INR PPP: 1.17 (ref 0.85–1.15)
LACTATE SERPL-SCNC: 1.3 MMOL/L (ref 0.7–2)
LIPASE SERPL-CCNC: 85 U/L (ref 73–393)
LYMPHOCYTES # BLD AUTO: 0.9 10E3/UL (ref 0.8–5.3)
LYMPHOCYTES NFR BLD AUTO: 8 %
MCH RBC QN AUTO: 29.9 PG (ref 26.5–33)
MCHC RBC AUTO-ENTMCNC: 32.5 G/DL (ref 31.5–36.5)
MCV RBC AUTO: 92 FL (ref 78–100)
MONOCYTES # BLD AUTO: 0.8 10E3/UL (ref 0–1.3)
MONOCYTES NFR BLD AUTO: 7 %
NEUTROPHILS # BLD AUTO: 9.3 10E3/UL (ref 1.6–8.3)
NEUTROPHILS NFR BLD AUTO: 83 %
NRBC # BLD AUTO: 0 10E3/UL
NRBC BLD AUTO-RTO: 0 /100
PLATELET # BLD AUTO: 229 10E3/UL (ref 150–450)
POTASSIUM BLD-SCNC: 3.7 MMOL/L (ref 3.4–5.3)
POTASSIUM BLD-SCNC: 5.4 MMOL/L (ref 3.4–5.3)
PROT SERPL-MCNC: 6.4 G/DL (ref 6.8–8.8)
RBC # BLD AUTO: 3.11 10E6/UL (ref 4.4–5.9)
SARS-COV-2 RNA RESP QL NAA+PROBE: NEGATIVE
SODIUM SERPL-SCNC: 133 MMOL/L (ref 133–144)
SPECIMEN EXPIRATION DATE: NORMAL
TROPONIN I SERPL HS-MCNC: 8 NG/L
UPPER GI ENDOSCOPY: NORMAL
WBC # BLD AUTO: 11.2 10E3/UL (ref 4–11)

## 2022-02-03 PROCEDURE — 86901 BLOOD TYPING SEROLOGIC RH(D): CPT | Performed by: PHYSICIAN ASSISTANT

## 2022-02-03 PROCEDURE — 85025 COMPLETE CBC W/AUTO DIFF WBC: CPT | Performed by: EMERGENCY MEDICINE

## 2022-02-03 PROCEDURE — 96372 THER/PROPH/DIAG INJ SC/IM: CPT | Mod: XS | Performed by: PHYSICIAN ASSISTANT

## 2022-02-03 PROCEDURE — 84132 ASSAY OF SERUM POTASSIUM: CPT | Performed by: PHYSICIAN ASSISTANT

## 2022-02-03 PROCEDURE — 86923 COMPATIBILITY TEST ELECTRIC: CPT | Performed by: EMERGENCY MEDICINE

## 2022-02-03 PROCEDURE — 250N000011 HC RX IP 250 OP 636: Performed by: PHYSICIAN ASSISTANT

## 2022-02-03 PROCEDURE — 74177 CT ABD & PELVIS W/CONTRAST: CPT | Mod: ME

## 2022-02-03 PROCEDURE — 99285 EMERGENCY DEPT VISIT HI MDM: CPT | Mod: 25

## 2022-02-03 PROCEDURE — G0500 MOD SEDAT ENDO SERVICE >5YRS: HCPCS | Performed by: INTERNAL MEDICINE

## 2022-02-03 PROCEDURE — 36415 COLL VENOUS BLD VENIPUNCTURE: CPT | Performed by: EMERGENCY MEDICINE

## 2022-02-03 PROCEDURE — 250N000011 HC RX IP 250 OP 636: Performed by: INTERNAL MEDICINE

## 2022-02-03 PROCEDURE — 85730 THROMBOPLASTIN TIME PARTIAL: CPT | Performed by: EMERGENCY MEDICINE

## 2022-02-03 PROCEDURE — 250N000009 HC RX 250: Performed by: EMERGENCY MEDICINE

## 2022-02-03 PROCEDURE — 36415 COLL VENOUS BLD VENIPUNCTURE: CPT | Performed by: PHYSICIAN ASSISTANT

## 2022-02-03 PROCEDURE — 83605 ASSAY OF LACTIC ACID: CPT | Performed by: EMERGENCY MEDICINE

## 2022-02-03 PROCEDURE — 93005 ELECTROCARDIOGRAM TRACING: CPT

## 2022-02-03 PROCEDURE — 43235 EGD DIAGNOSTIC BRUSH WASH: CPT | Performed by: INTERNAL MEDICINE

## 2022-02-03 PROCEDURE — C9803 HOPD COVID-19 SPEC COLLECT: HCPCS

## 2022-02-03 PROCEDURE — 96374 THER/PROPH/DIAG INJ IV PUSH: CPT | Mod: 59

## 2022-02-03 PROCEDURE — 258N000003 HC RX IP 258 OP 636: Performed by: EMERGENCY MEDICINE

## 2022-02-03 PROCEDURE — 250N000012 HC RX MED GY IP 250 OP 636 PS 637: Performed by: PHYSICIAN ASSISTANT

## 2022-02-03 PROCEDURE — C9113 INJ PANTOPRAZOLE SODIUM, VIA: HCPCS | Performed by: PHYSICIAN ASSISTANT

## 2022-02-03 PROCEDURE — 80053 COMPREHEN METABOLIC PANEL: CPT | Performed by: EMERGENCY MEDICINE

## 2022-02-03 PROCEDURE — 96376 TX/PRO/DX INJ SAME DRUG ADON: CPT | Mod: 59

## 2022-02-03 PROCEDURE — 120N000001 HC R&B MED SURG/OB

## 2022-02-03 PROCEDURE — 99223 1ST HOSP IP/OBS HIGH 75: CPT | Mod: AI | Performed by: PHYSICIAN ASSISTANT

## 2022-02-03 PROCEDURE — 258N000003 HC RX IP 258 OP 636: Performed by: PHYSICIAN ASSISTANT

## 2022-02-03 PROCEDURE — C9113 INJ PANTOPRAZOLE SODIUM, VIA: HCPCS | Performed by: EMERGENCY MEDICINE

## 2022-02-03 PROCEDURE — 250N000011 HC RX IP 250 OP 636: Performed by: EMERGENCY MEDICINE

## 2022-02-03 PROCEDURE — 85018 HEMOGLOBIN: CPT | Performed by: PHYSICIAN ASSISTANT

## 2022-02-03 PROCEDURE — 84484 ASSAY OF TROPONIN QUANT: CPT | Performed by: EMERGENCY MEDICINE

## 2022-02-03 PROCEDURE — 96361 HYDRATE IV INFUSION ADD-ON: CPT | Mod: 59

## 2022-02-03 PROCEDURE — 250N000013 HC RX MED GY IP 250 OP 250 PS 637: Performed by: INTERNAL MEDICINE

## 2022-02-03 PROCEDURE — 86850 RBC ANTIBODY SCREEN: CPT | Performed by: PHYSICIAN ASSISTANT

## 2022-02-03 PROCEDURE — 85610 PROTHROMBIN TIME: CPT | Performed by: EMERGENCY MEDICINE

## 2022-02-03 PROCEDURE — 87635 SARS-COV-2 COVID-19 AMP PRB: CPT | Performed by: EMERGENCY MEDICINE

## 2022-02-03 PROCEDURE — 83690 ASSAY OF LIPASE: CPT | Performed by: EMERGENCY MEDICINE

## 2022-02-03 RX ORDER — ONDANSETRON 4 MG/1
4 TABLET, ORALLY DISINTEGRATING ORAL EVERY 6 HOURS PRN
Status: DISCONTINUED | OUTPATIENT
Start: 2022-02-03 | End: 2022-02-04 | Stop reason: HOSPADM

## 2022-02-03 RX ORDER — DEXTROSE MONOHYDRATE 25 G/50ML
25-50 INJECTION, SOLUTION INTRAVENOUS
Status: DISCONTINUED | OUTPATIENT
Start: 2022-02-03 | End: 2022-02-04 | Stop reason: HOSPADM

## 2022-02-03 RX ORDER — MAGNESIUM CARB/ALUMINUM HYDROX 105-160MG
296 TABLET,CHEWABLE ORAL ONCE
Status: COMPLETED | OUTPATIENT
Start: 2022-02-04 | End: 2022-02-04

## 2022-02-03 RX ORDER — NALOXONE HYDROCHLORIDE 0.4 MG/ML
0.2 INJECTION, SOLUTION INTRAMUSCULAR; INTRAVENOUS; SUBCUTANEOUS
Status: DISCONTINUED | OUTPATIENT
Start: 2022-02-03 | End: 2022-02-04 | Stop reason: HOSPADM

## 2022-02-03 RX ORDER — NALOXONE HYDROCHLORIDE 0.4 MG/ML
0.4 INJECTION, SOLUTION INTRAMUSCULAR; INTRAVENOUS; SUBCUTANEOUS
Status: DISCONTINUED | OUTPATIENT
Start: 2022-02-03 | End: 2022-02-04 | Stop reason: HOSPADM

## 2022-02-03 RX ORDER — NICOTINE POLACRILEX 4 MG
15-30 LOZENGE BUCCAL
Status: DISCONTINUED | OUTPATIENT
Start: 2022-02-03 | End: 2022-02-04 | Stop reason: HOSPADM

## 2022-02-03 RX ORDER — FENTANYL CITRATE 50 UG/ML
25 INJECTION, SOLUTION INTRAMUSCULAR; INTRAVENOUS EVERY 5 MIN PRN
Status: DISCONTINUED | OUTPATIENT
Start: 2022-02-03 | End: 2022-02-03

## 2022-02-03 RX ORDER — FLUMAZENIL 0.1 MG/ML
0.2 INJECTION, SOLUTION INTRAVENOUS
Status: DISCONTINUED | OUTPATIENT
Start: 2022-02-03 | End: 2022-02-04 | Stop reason: HOSPADM

## 2022-02-03 RX ORDER — LIDOCAINE 40 MG/G
CREAM TOPICAL
Status: DISCONTINUED | OUTPATIENT
Start: 2022-02-03 | End: 2022-02-04 | Stop reason: HOSPADM

## 2022-02-03 RX ORDER — ONDANSETRON 2 MG/ML
4 INJECTION INTRAMUSCULAR; INTRAVENOUS EVERY 6 HOURS PRN
Status: DISCONTINUED | OUTPATIENT
Start: 2022-02-03 | End: 2022-02-04 | Stop reason: HOSPADM

## 2022-02-03 RX ORDER — FENTANYL CITRATE 50 UG/ML
INJECTION, SOLUTION INTRAMUSCULAR; INTRAVENOUS PRN
Status: COMPLETED | OUTPATIENT
Start: 2022-02-03 | End: 2022-02-03

## 2022-02-03 RX ORDER — ONDANSETRON 2 MG/ML
4 INJECTION INTRAMUSCULAR; INTRAVENOUS ONCE
Status: DISCONTINUED | OUTPATIENT
Start: 2022-02-03 | End: 2022-02-03

## 2022-02-03 RX ORDER — FENTANYL CITRATE 50 UG/ML
50 INJECTION, SOLUTION INTRAMUSCULAR; INTRAVENOUS
Status: DISCONTINUED | OUTPATIENT
Start: 2022-02-03 | End: 2022-02-03

## 2022-02-03 RX ORDER — BISACODYL 5 MG
10 TABLET, DELAYED RELEASE (ENTERIC COATED) ORAL ONCE
Status: DISCONTINUED | OUTPATIENT
Start: 2022-02-03 | End: 2022-02-04 | Stop reason: HOSPADM

## 2022-02-03 RX ORDER — IOPAMIDOL 755 MG/ML
100 INJECTION, SOLUTION INTRAVASCULAR ONCE
Status: COMPLETED | OUTPATIENT
Start: 2022-02-03 | End: 2022-02-03

## 2022-02-03 RX ORDER — SODIUM CHLORIDE 9 MG/ML
INJECTION, SOLUTION INTRAVENOUS CONTINUOUS
Status: DISCONTINUED | OUTPATIENT
Start: 2022-02-03 | End: 2022-02-04

## 2022-02-03 RX ORDER — LIDOCAINE 40 MG/G
CREAM TOPICAL
Status: DISCONTINUED | OUTPATIENT
Start: 2022-02-03 | End: 2022-02-03 | Stop reason: HOSPADM

## 2022-02-03 RX ORDER — POLYETHYLENE GLYCOL 3350 17 G/17G
238 POWDER, FOR SOLUTION ORAL ONCE
Status: COMPLETED | OUTPATIENT
Start: 2022-02-03 | End: 2022-02-03

## 2022-02-03 RX ORDER — ACETAMINOPHEN 325 MG/1
650 TABLET ORAL EVERY 6 HOURS PRN
Status: DISCONTINUED | OUTPATIENT
Start: 2022-02-03 | End: 2022-02-04 | Stop reason: HOSPADM

## 2022-02-03 RX ORDER — ACETAMINOPHEN 650 MG/1
650 SUPPOSITORY RECTAL EVERY 6 HOURS PRN
Status: DISCONTINUED | OUTPATIENT
Start: 2022-02-03 | End: 2022-02-04 | Stop reason: HOSPADM

## 2022-02-03 RX ADMIN — SODIUM CHLORIDE: 9 INJECTION, SOLUTION INTRAVENOUS at 12:54

## 2022-02-03 RX ADMIN — PANTOPRAZOLE SODIUM 40 MG: 40 INJECTION, POWDER, FOR SOLUTION INTRAVENOUS at 22:20

## 2022-02-03 RX ADMIN — MIDAZOLAM 2 MG: 1 INJECTION INTRAMUSCULAR; INTRAVENOUS at 13:36

## 2022-02-03 RX ADMIN — POLYETHYLENE GLYCOL 3350 238 G: 17 POWDER, FOR SOLUTION ORAL at 18:25

## 2022-02-03 RX ADMIN — SODIUM CHLORIDE 68 ML: 900 INJECTION INTRAVENOUS at 08:44

## 2022-02-03 RX ADMIN — FENTANYL CITRATE 100 MCG: 50 INJECTION, SOLUTION INTRAMUSCULAR; INTRAVENOUS at 13:35

## 2022-02-03 RX ADMIN — IOPAMIDOL 100 ML: 755 INJECTION, SOLUTION INTRAVENOUS at 08:44

## 2022-02-03 RX ADMIN — INSULIN ASPART 3 UNITS: 100 INJECTION, SOLUTION INTRAVENOUS; SUBCUTANEOUS at 23:31

## 2022-02-03 RX ADMIN — SODIUM CHLORIDE 1000 ML: 9 INJECTION, SOLUTION INTRAVENOUS at 07:39

## 2022-02-03 RX ADMIN — SODIUM CHLORIDE: 9 INJECTION, SOLUTION INTRAVENOUS at 22:25

## 2022-02-03 RX ADMIN — PANTOPRAZOLE SODIUM 40 MG: 40 INJECTION, POWDER, FOR SOLUTION INTRAVENOUS at 09:37

## 2022-02-03 ASSESSMENT — ACTIVITIES OF DAILY LIVING (ADL)
CONCENTRATING,_REMEMBERING_OR_MAKING_DECISIONS_DIFFICULTY: NO
ADLS_ACUITY_SCORE: 3
FALL_HISTORY_WITHIN_LAST_SIX_MONTHS: NO
WALKING_OR_CLIMBING_STAIRS_DIFFICULTY: NO
ADLS_ACUITY_SCORE: 7
ADLS_ACUITY_SCORE: 3
ADLS_ACUITY_SCORE: 3
DIFFICULTY_COMMUNICATING: NO
WEAR_GLASSES_OR_BLIND: NO
TOILETING_ISSUES: NO
DRESSING/BATHING_DIFFICULTY: NO
ADLS_ACUITY_SCORE: 3
DOING_ERRANDS_INDEPENDENTLY_DIFFICULTY: NO
ADLS_ACUITY_SCORE: 7
ADLS_ACUITY_SCORE: 3
HEARING_DIFFICULTY_OR_DEAF: NO
ADLS_ACUITY_SCORE: 3
ADLS_ACUITY_SCORE: 7
ADLS_ACUITY_SCORE: 3
ADLS_ACUITY_SCORE: 3
DIFFICULTY_EATING/SWALLOWING: NO
ADLS_ACUITY_SCORE: 3
ADLS_ACUITY_SCORE: 7
ADLS_ACUITY_SCORE: 7

## 2022-02-03 ASSESSMENT — ENCOUNTER SYMPTOMS
SHORTNESS OF BREATH: 0
BLOOD IN STOOL: 1
ABDOMINAL PAIN: 0
LIGHT-HEADEDNESS: 1
NAUSEA: 1
DIZZINESS: 1

## 2022-02-03 NOTE — PROGRESS NOTES
[facility] Brief Note    EGD completed.    Findings:        The Z-line was regular and was found 38 cm from the incisors.        A small 1cm hiatal hernia was present. The gastric mucosa appeared        normal.        A few localized erosions were in the duodenal bulb. Very friable and        small amount of oozing with passage of the scope but no oozing prior to passage of the scope. Mild erythema        surrounding. No ulcer seen. The more distal duodenum was normal. No        blood seen in duodenum downstream.                                                                                     A/P:  Duodenitis, likely from ASA. Did not appear severe enough to have caused the bloody stools described or the drop in hemoglobin.  I would recommend a colonoscopy for further evaluation.    - colonoscopy tomorrow  - clear liquid diet  - recommend chewable ASA taken with food to protect the duodenum  - Continue IV ppi today and tomorrow until eating normally  - at discharge omeprazole 40mg daily    Cookie Staley MD  [facility]  [phone]

## 2022-02-03 NOTE — H&P
[facility]    History and Physical  Hospitalist       Date of Admission:  2/3/2022    Assessment & Plan   Jakub Robles is a 71 year old male with a past medical history significant for coronary artery disease s/p stenting 2016, T2DM, HTN, HLD, hx tobacco use, diverticulosis who was admitted on 2/3/2022 after presenting to the ED with dizziness hematochezia.     GI bleed  Acute blood loss anemia   Presents with 2 episodes of dark, bloody stool with near syncope. No associated abdominal pain. He is on asa 81mg daily, otherwise denies further nsaid use or AC. Last colonoscopy  with diverticulosis and polyp removal.   Hypotensive for EMS (took nitro prior to arrival as well), BP now soft but stable. HR WNL.  BUN 31, K 5.4.   hgb 9.3 from 13.7 2021.   --admit to inpatient  --type and screen  --hold asa   --conditional unit for hgb <7, consent has been obtained  --protonix IV bid  --hgb q6 hours for now  --keep NPO  --GI consult, appreciate assistance    Hyperkalemia   K 5.4. in the setting of GIB, ARB use  --hold ARB  --repeat K later today  --tele  --IVF    AAA.   Newly identified infrarenal AAA measuring 3.1cm. father  of ruptured AAA. Reports he was due for US screening. Advised to follow up P[facility] for surveillance.     CAD s/p Smooth to mCx, RCA in 2016  HTN  HLD  CAD diagnosed after abnormal stress test obtained due to high calcium score. Plays softball regularly without anginal symptoms. EKG without acute changes. Trop negative. He was concerned about his hear with dizziness/nausea this am and took nitro. Denies CP presently.   --asa on hold  --continue statin  --hold losartan, amlodipine in the setting of GIB    T2DM  Last A1C 6.4.  in ED  PTA: metformin 1000mg bid, glimepiride 2mg daily   --hold PTA oral meds given npo status  --medium sliding scale insulin prn     DVT Prophylaxis: Pneumatic Compression Devices  Code Status: No Order    Disposition: Expected  discharge in 2 days pending clinical  Course, GI evaluation    Patient was discussed with Dr. Newman who agrees with the above plan     Harriet Mendoza PA-C    Primary Care Physician   Markel Kauffman    Chief Complaint   Bloody stool    History is obtained from the patient    History of Present Illness   Jakub Robles is a 71 year old male with a past medical history significant for coronary artery disease s/p stenting 2016, T2DM, HTN, HLD, hx tobacco use, diverticulosis who was admitted on 2/3/2022 after presenting to the ED with dizziness hematochezia.  The patient reports he was in his usual state of health when he went to bed the evening prior to admission.  He had lasagna and 2 glasses of wine for dinner and woke up in the middle the night with some acid reflux symptoms.  He took some baking soda with little improvement and went to the bathroom where he had an episode of diarrhea.  He reports he did not look closely but felt this was dark and possibly bloody.  He went back to bed and when he woke up again this morning had another episode of diarrhea with associated lightheadedness, nausea.  He describes stool as loose, dark red/maroon with clots.  Shortly after this he felt very close to passing out.  He had some mild left arm pain and was concerned that his symptoms may be related to his heart (no chest pain or shortness of breath at the time) and took a nitroglycerin.  He yelled for his wife who called EMS.  On arrival patient was reportedly hypotensive in the 70s with blood sugars in the 400s.  EMS gave aspirin and patient was brought to the emergency department for further evaluation.  Work-up revealed hemoglobin of 9.3 with elevated BUN and potassium of 5.4.  He had some tenderness on exam and CT of the abdomen and pelvis was pursued which was negative for acute findings but did show an infrarenal abdominal aortic aneurysm.  He was given Protonix for suspected GI bleed and admission was  requested.  He is presently evaluated in his room in the ED.  He denies any abdominal pain at this time and states he had no pain prior to admission either.  His reflux symptoms have improved and he denies any nausea.  His last stool was prior to presentation.  He currently does not feel dizzy or lightheaded just sitting here.  Denies chest pain or shortness of breath.  He does not take NSAIDs regularly outside of his daily aspirin.  Denies previous issues with GI bleeding.  He has had some intermittent loose stools since increasing Metformin dose a few months ago.    Past Medical History    Past Medical History:   Diagnosis Date     Diabetes mellitus (H)      Heart disease      Hepatitis C 2012     Hyperlipidemia      Hypertension        Past Surgical History   Past Surgical History:   Procedure Laterality Date     CARDIAC SURGERY  2016    stents x 2     COLONOSCOPY  2021     HEAD & NECK SURGERY      dental implants     implant[      dental     SINUS SURGERY  1998       Prior to Admission Medications   Prior to Admission Medications   Prescriptions Last Dose Informant Patient Reported? Taking?   ACCU-CHEK RYAN PLUS test strip   No No   Sig: USE TO TEST BLOOD SUGAR TWICE DAILY   NITROGLYCERIN SL   Yes No   Sig: Place 0.4 mg under the tongue as needed for chest pain   amLODIPine (NORVASC) 5 MG tablet   No No   Sig: TAKE 1 TABLET EVERY DAY   aspirin 81 MG tablet   Yes No   Sig: Take 1 tablet by mouth daily   atorvastatin (LIPITOR) 40 MG tablet   No No   Sig: TAKE 1 TABLET EVERY DAY   blood glucose monitoring (ACCU-CHEK MULTICLIX) lancets   No No   Sig: by In Vitro route 2 times daily   glimepiride (AMARYL) 2 MG tablet   No No   Sig: TAKE 1 TABLET EVERY MORNING BEFORE BREAKFAST (NEED 6 MONTH LAB TO CHECK A1C AND OFFICE VISIT BEFORE MORE REFILLS)   losartan (COZAAR) 100 MG tablet   No No   Sig: Take 1 tablet (100 mg) by mouth daily   metFORMIN (GLUCOPHAGE) 1000 MG tablet   No No   Sig: Take 1 tablet (1,000 mg) by mouth  2 times daily (with meals)   omega 3 1000 MG CAPS   No No   Sig: Take 1 g by mouth daily   sildenafil (VIAGRA) 100 MG tablet   No No   Sig: Take 1 tablet (100 mg) by mouth daily as needed (erectile dysfunction)      Facility-Administered Medications: None     Allergies   No Known Allergies    Social History   Former smoker. Quit smoking cigarettes 20 years ago. Still smokes cigars occasionally. Drinks 2 glasses of wine every night with dinner.     Family History   Father  of ruptured AAA    Review of Systems   The 10 point Review of Systems is negative other than noted in the HPI or here.     Physical Exam   Temp: 98.2  F (36.8  C) Temp src: Oral BP: 118/72 Pulse: 74   Resp: 11 SpO2: 100 %      There were no vitals filed for this visit.  Vital Signs with Ranges  Temp:  [98.2  F (36.8  C)] 98.2  F (36.8  C)  Pulse:  [60-82] 74  Resp:  [9-18] 11  BP: ()/(49-72) 118/72  SpO2:  [99 %-100 %] 100 %  No intake/output data recorded.    Constitutional: Alert and oriented, laying down in bed. Appears comfortable and is appropriately conversant   ENT:  moist mucous membranes  Eyes:  Sclera anicteric, EOMI  Respiratory: Lungs clear to auscultation bilaterally, no increased work of breathing  Cardiovascular: Regular rate and rhythm  GI: active bowel sounds, abdomen soft, non-tender to palpation   MSK:  Moves all four extremities. Normal tone  Neuro:  Speech is clear. Face symmetric. Follows commands.     Data   Data reviewed today:  I personally reviewed the EKG tracing showing no acute ST changes .  Recent Labs   Lab 22  0736 22  0709   WBC 11.2*  --    HGB 9.3*  --    MCV 92  --      --    INR 1.17*  --      --    POTASSIUM 5.4*  --    CHLORIDE 103  --    CO2 27  --    BUN 31*  --    CR 0.80  --    ANIONGAP 3  --    VALENTIN 8.2*  --    * 271*   ALBUMIN 3.4  --    PROTTOTAL 6.4*  --    BILITOTAL 0.3  --    ALKPHOS 42  --    ALT 21  --    AST 11  --    LIPASE 85  --        Recent Results  (from the past 24 hour(s))   CT Abdomen Pelvis w Contrast    Narrative    CT ABDOMEN AND PELVIS WITH CONTRAST 2/3/2022 8:56 AM    CLINICAL HISTORY: Abdominal pain, acute, nonlocalized.    TECHNIQUE: CT scan of the abdomen and pelvis was performed following  injection of IV contrast. Multiplanar reformats were obtained. Dose  reduction techniques were used.  CONTRAST: 100 mL Isovue-[id number]    COMPARISON: None.    FINDINGS:   LOWER CHEST: No infiltrates or effusions.    HEPATOBILIARY: Diffuse hepatic steatosis. No significant mass. No bile  duct dilatation. No calcified gallstones.    PANCREAS: No significant mass, duct dilatation, or inflammatory  change.    SPLEEN: Normal size.    ADRENAL GLANDS: No significant nodules.    KIDNEYS/BLADDER: No significant mass, stones, or hydronephrosis.    BOWEL: No obstruction or inflammatory change.    PELVIC ORGANS: No pelvic masses.    ADDITIONAL FINDINGS: 3.1 cm infrarenal abdominal aortic aneurysm. No  free air or free fluid.    MUSCULOSKELETAL: No frankly destructive bony lesions.      Impression    IMPRESSION:   1.  No acute process demonstrated.  2.  3.1 cm infrarenal abdominal aortic aneurysm.    BIANCA MARIE MD         SYSTEM ID:  [id number]

## 2022-02-03 NOTE — ED NOTES
Bed: ED21  Expected date: 2/3/22  Expected time: 7:00 AM  Means of arrival: Ambulance  Comments:  Ana [address] 71M hypotensive/elevated glucose/blood in stool

## 2022-02-03 NOTE — CONSULTS
[facility] GASTROENTEROLOGY CONSULTATION     Jakub Robles  [address] [facility] [address]  71 year old male    Admission Date/Time: 2/3/2022  Primary Care Provider: Markel Kauffman    We were asked to see the patient in consultation by Dr. Dudley for evaluation of bloody stools.        HPI:  Jakub Robles is a 71 year old male with a history of diabetes and CAD  who presented to [facility] 2/3/2022 with bloody diarrhea.  He states he woke up very early this morning with two episodes of bloody diarrhea. No abdominal pain but he did have nausea. Stools appeared dark per his report. [place] light headed and took an ambulance to the hospital. Denies fever or chills. No bowel movement since about 5am.  Does not use any NSAIDs for pain but he does take an NSAID daily and also drinks alcohol.    Last colonoscopy August 2021 with several small adenomas and left sided diverticular disease.    Hemoglobin 9.3.  Was 13.7 in July 2022.     ROS: A comprehensive ten point review of systems was negative aside from those in mentioned in the HPI.      MEDICATIONS: No current facility-administered medications on file prior to encounter.  amLODIPine (NORVASC) 5 MG tablet, TAKE 1 TABLET EVERY DAY  aspirin 81 MG tablet, Take 1 tablet by mouth daily  atorvastatin (LIPITOR) 40 MG tablet, TAKE 1 TABLET EVERY DAY  glimepiride (AMARYL) 2 MG tablet, TAKE 1 TABLET EVERY MORNING BEFORE BREAKFAST (NEED 6 MONTH LAB TO CHECK A1C AND OFFICE VISIT BEFORE MORE REFILLS)  losartan (COZAAR) 100 MG tablet, Take 1 tablet (100 mg) by mouth daily  metFORMIN (GLUCOPHAGE) 1000 MG tablet, Take 1 tablet (1,000 mg) by mouth 2 times daily (with meals)  omega 3 1000 MG CAPS, Take 1 g by mouth daily  ACCU-CHEK RYAN PLUS test strip, USE TO TEST BLOOD SUGAR TWICE DAILY  blood glucose monitoring (ACCU-CHEK MULTICLIX) lancets, by In Vitro route 2 times daily  NITROGLYCERIN SL, Place 0.4 mg under the tongue as needed for chest pain  sildenafil  (VIAGRA) 100 MG tablet, Take 1 tablet (100 mg) by mouth daily as needed (erectile dysfunction)        ALLERGIES: No Known Allergies    Past Medical History:   Diagnosis Date     Diabetes mellitus (H)      Heart disease      Hepatitis C 2012     Hyperlipidemia      Hypertension        Past Surgical History:   Procedure Laterality Date     CARDIAC SURGERY  2016    stents x 2     COLONOSCOPY       HEAD & NECK SURGERY      dental implants     implant[      dental     SINUS SURGERY           SOCIAL HISTORY:  Social History     Tobacco Use     Smoking status: Light Tobacco Smoker     Packs/day: 0.00     Years: 0.00     Pack years: 0.00     Types: Cigars     Start date: 1968     Last attempt to quit: 2001     Years since quittin.1     Smokeless tobacco: Never Used     Tobacco comment: I still vape   Substance Use Topics     Alcohol use: Yes     Comment: couple glasses wine/day     Drug use: No       FAMILY HISTORY:  Aunt with some type of colon issue.    PHYSICAL EXAM:   BP 99/62 (BP Location: Left arm)   Pulse 75   Temp 97.9  F (36.6  C) (Oral)   Resp 11   SpO2 100%     Constitutional: NAD, comfortable  Cardiovascular: RRR, normal S1 and S2  Respiratory: CTAB  Psychiatric: mentation appears normal and affect normal/bright  Head: Normocephalic. Atraumatic.    Neck:  normal size, trachea midline  Eyes:   no icterus  ENT: hearing adequate, pharynx normal without erythema or exudate  Abdomen:   Auscultation: noral  Appearance: normal  Palpation: nontender  NEURO: grossly negative  SKIN: no suspicious lesions or rashes          ADDITIONAL COMMENTS:   I reviewed the patient's new clinical lab test results.   Recent Labs   Lab Test 22  0736 21  0918 19  1114   WBC 11.2* 6.3 5.8   HGB 9.3* 13.7 13.0*   MCV 92 91 88    197 196   INR 1.17*  --   --      Recent Labs   Lab Test 22  0736 22  0709 21  0918 20  0746     --  135 134   POTASSIUM 5.4*  --  4.7  4.2   CHLORIDE 103  --  102 103   CO2 27  --  30 27   BUN 31*  --  17 17   CR 0.80  --  0.86 0.81   ANIONGAP 3  --  3 4   VALENTIN 8.2*  --  9.0 9.0   * 271* 191* 184*     Recent Labs   Lab Test 02/03/22  0736 07/12/21  0918 03/19/21  1056 06/24/20  0746 09/06/19  1114 08/31/18  0836 08/18/17  0758 08/18/17  0757   ALBUMIN 3.4 4.3  --   --  4.5  --    < >  --    BILITOTAL 0.3 0.6  --   --  0.5  --    < >  --    ALT 21 27 21   < > 24  --    < >  --    AST 11 19  --   --  15  --    < >  --    ALKPHOS 42 51  --   --  46  --    < >  --    PROTEIN  --   --   --   --  Negative Negative  --  Negative   LIPASE 85  --   --   --   --   --   --   --     < > = values in this interval not displayed.       2/3/22 CT Abdomen:  1.  No acute process demonstrated.  2.  3.1 cm infrarenal abdominal aortic aneurysm.    CONSULTATION ASSESSMENT AND PLAN:    Active Problems:    GI bleed    Assessment: Several episodes of hematochezia overnight.  Drop in hemoglobin.  Painless, consider diverticular bleed. However, BUN slightly elevated and report of nausea, upper GI bleeding should be ruled out as well.  Ischemia possible as well. Recent colonoscopy about 6 months ago with diverticulosis and small polyps.  Has never had an upper endoscopy.      Plan:   - EGD today  - if negative, colonoscopy tomorrow      Cookie Staley MD  [facility]  Office:  [phone]  Cell phone  [phone]

## 2022-02-03 NOTE — PHARMACY-ADMISSION MEDICATION HISTORY
Pharmacy Medication History  Admission medication history interview status for the 2/3/2022  admission is complete. See EPIC admission navigator for prior to admission medications     Location of Interview: Patient room  Medication history sources: Patient    Significant changes made to the medication list:  none    In the past week, patient estimated taking medication this percent of the time: greater than 90%    Additional medication history information:   none    Medication reconciliation completed by provider prior to medication history? No    Time spent in this activity: 15 mins    Prior to Admission medications    Medication Sig Last Dose Taking? Auth Provider   amLODIPine (NORVASC) 5 MG tablet TAKE 1 TABLET EVERY DAY 2/2/2022 at Unknown time Yes Gavin Sevilla MD   aspirin 81 MG tablet Take 1 tablet by mouth daily 2/2/2022 at Unknown time Yes Reported, Patient   atorvastatin (LIPITOR) 40 MG tablet TAKE 1 TABLET EVERY DAY 2/2/2022 at Unknown time Yes Markel Kauffman MD   glimepiride (AMARYL) 2 MG tablet TAKE 1 TABLET EVERY MORNING BEFORE BREAKFAST (NEED 6 MONTH LAB TO CHECK A1C AND OFFICE VISIT BEFORE MORE REFILLS) 2/2/2022 at Unknown time Yes Gavin Sevilla MD   losartan (COZAAR) 100 MG tablet Take 1 tablet (100 mg) by mouth daily 2/2/2022 at Unknown time Yes Markel Kauffman MD   metFORMIN (GLUCOPHAGE) 1000 MG tablet Take 1 tablet (1,000 mg) by mouth 2 times daily (with meals) 2/2/2022 at Unknown time Yes Markel Kauffman MD   omega 3 1000 MG CAPS Take 1 g by mouth daily 2/2/2022 at Unknown time Yes Gavin Sevilla MD   ACCU-CHEK RYAN PLUS test strip USE TO TEST BLOOD SUGAR TWICE DAILY   Brenna Carrero MD   blood glucose monitoring (ACCU-CHEK MULTICLIX) lancets by In Vitro route 2 times daily   Brenna Carrero MD   NITROGLYCERIN SL Place 0.4 mg under the tongue as needed for chest pain prn  Reported, Patient   sildenafil (VIAGRA) 100 MG tablet Take 1 tablet (100  mg) by mouth daily as needed (erectile dysfunction) did not ask  Markel Kauffman MD Tiffany M. Reinitz, PharmD      The information provided in this note is only as accurate as the sources available at the time of update(s)

## 2022-02-03 NOTE — ED TRIAGE NOTES
Pt BIBA from home     Pt woke up at 0300. Pt noticed dizzy, pt walked to bathroom had 2x diarrhea. Pt noticed blood in BM.     Pt took 1 NTG because he felt heartburn.     One EMS arrival pt still was lightheaded, hypotensive.       324mg ASA was given by EMS, pt never co chest pain

## 2022-02-03 NOTE — ED PROVIDER NOTES
"  History   Chief Complaint:  Dizziness and Bloody Stools     The history is provided by the patient.      Jakub Robles is a 71 year old male with history of hypertension and type II diabetes who presents with dizziness and bloody stools. Jakub went to bed at his baseline state of health. About 4 hours prior to arrival he awoke and felt dizzy. He had a bowel movement which he believed was bloody (though it was dark in the room). He went back to bed and awoke 2 hours prior to arrival with another episode of hematochezia. He felt lightheaded with nausea and took a nitroglycerin because \"I don't know what a heart attack feels like\" but was having no chest pain or dyspnea. He called paramedics who found him initially hypotensive to 70/40s with hyperglycemia to 400s. His blood pressure improved en route. He was given aspirin 324 mg en route.    Notably, Jakub has had \"acid stomach\" recently including last night for which he drank water with baking soda. He last had a colonoscopy in August 2021. He has never had endoscopy. He denies history of ulcers. He denies abdominal pain.    Review of Systems   Constitutional: Negative for activity change.   Respiratory: Negative for shortness of breath.    Cardiovascular: Negative for chest pain.   Gastrointestinal: Positive for blood in stool and nausea. Negative for abdominal pain and vomiting.   Neurological: Positive for dizziness and light-headedness.   All other systems reviewed and are negative.    Allergies:  The patient has no known allergies.     Medications:  Norvasc  Lipitor  Amaryl  Cozaar  Glucophage  Nitroglycerin  Viagra  Aspirin    Past Medical History:   CAD  Colonic polyps  Dyspnea on exertion  GI bleed  Hepatitis C  Hyperlipidemia  Hypertension  Microalbuminuria  Type II diabetes    Past Surgical History:    Coronary stent placement x2  Colonoscopy  Dental implant surgery  Sinus surgery     Family History:    Mother: Arthritis, pancreatic cancer  Father: " AAA  Brother: Unspecified cancer    Social History:  The patient presents to the ED with EMS.    Physical Exam     Patient Vitals for the past 24 hrs:   BP Temp Temp src Pulse Resp SpO2   02/03/22 1115 97/66 -- -- 74 11 100 %   02/03/22 1100 101/66 -- -- 67 10 100 %   02/03/22 1045 107/58 -- -- 67 10 100 %   02/03/22 1030 112/70 -- -- 73 13 100 %   02/03/22 1015 108/70 -- -- 74 13 100 %   02/03/22 0945 118/72 -- -- 74 11 100 %   02/03/22 0915 107/62 -- -- 74 12 100 %   02/03/22 0900 100/58 -- -- 60 10 100 %   02/03/22 0830 109/56 -- -- 61 12 99 %   02/03/22 0800 94/56 -- -- 60 9 100 %   02/03/22 0718 -- 98.2  F (36.8  C) Oral -- -- --   02/03/22 0707 106/49 -- -- 82 18 100 %       Physical Exam  General: Well-developed and well-nourished. Well appearing elderly  man. Cooperative.  Head:  Atraumatic.  Eyes:  Conjunctivae, lids, and sclerae are normal.  Neck:  Supple. Normal range of motion.  CV:  Regular rate and rhythm. Normal heart sounds with no murmurs, rubs, or gallops detected.  Resp:  No respiratory distress. Clear to auscultation bilaterally without decreased breath sounds, wheezing, rales, or rhonchi.  GI:  Soft. Non-distended. Right lower quadrant tenderness.   Rectal: MAGGY with return of maroon-red blood.  MS:  Normal ROM.   Skin:  Warm. Non-diaphoretic. No pallor.  Neuro:  Awake. A&Ox3. Normal strength.  Psych: Normal mood and affect. Normal speech.  Vitals reviewed.    Emergency Department Course   EKG  Indication: Dizziness  Time: 0708  Rate 63 bpm. WV interval 152. QRS duration 90. QT/QTc 400/409.   Normal sinus rhythm.   ST changes inferiorly are improved/resolved  No significant change as compared to prior, dated 5/22/13.    Imaging:  CT Abdomen Pelvis w Contrast   Final Result   IMPRESSION:    1.  No acute process demonstrated.   2.  3.1 cm infrarenal abdominal aortic aneurysm.      BIANCA MARIE MD            SYSTEM ID:  [id number]        Report per radiology    Laboratory:  Labs Ordered and  Resulted from Time of ED Arrival to Time of ED Departure   INR - Abnormal       Result Value    INR 1.17 (*)    COMPREHENSIVE METABOLIC PANEL - Abnormal    Sodium 133      Potassium 5.4 (*)     Chloride 103      Carbon Dioxide (CO2) 27      Anion Gap 3      Urea Nitrogen 31 (*)     Creatinine 0.80      Calcium 8.2 (*)     Glucose 282 (*)     Alkaline Phosphatase 42      AST 11      ALT 21      Protein Total 6.4 (*)     Albumin 3.4      Bilirubin Total 0.3      GFR Estimate >90     GLUCOSE BY METER - Abnormal    GLUCOSE BY METER POCT 271 (*)    CBC WITH PLATELETS AND DIFFERENTIAL - Abnormal    WBC Count 11.2 (*)     RBC Count 3.11 (*)     Hemoglobin 9.3 (*)     Hematocrit 28.6 (*)     MCV 92      MCH 29.9      MCHC 32.5      RDW 13.4      Platelet Count 229      % Neutrophils 83      % Lymphocytes 8      % Monocytes 7      % Eosinophils 2      % Basophils 0      % Immature Granulocytes 0      NRBCs per 100 WBC 0      Absolute Neutrophils 9.3 (*)     Absolute Lymphocytes 0.9      Absolute Monocytes 0.8      Absolute Eosinophils 0.2      Absolute Basophils 0.1      Absolute Immature Granulocytes 0.0      Absolute NRBCs 0.0     PARTIAL THROMBOPLASTIN TIME - Normal    aPTT 25     LIPASE - Normal    Lipase 85     LACTIC ACID WHOLE BLOOD - Normal    Lactic Acid 1.3     TROPONIN I - Normal    Troponin I High Sensitivity 8     COVID-19 VIRUS (CORONAVIRUS) BY PCR - Normal    SARS CoV2 PCR Negative        Emergency Department Course:     Reviewed:  I reviewed nursing notes, vitals, past medical history, and Care Everywhere.    Assessments:  0703 I obtained history and examined the patient as noted above.   0726 I rechecked the patient.  0913 I rechecked the patient and updated him on plans for admission.    Consults:  0925 I spoke to Dr. Newman, hospitalist, who agreed to admit the patient.    Interventions:  0739 NS Bolus 1 L IV  0841 Zofran 4 mg IV  0937 Protonix 40 mg IV     Disposition:  He was admitted to the hospital  "under the care of Dr. Newman.     Impression & Plan     Medical Decision Making:  Jakub is a 71-year-old man who has had 2 episodes of dark blood per rectum in the last 4 hours.  He also describes some dizziness.  He tells me he took nitroglycerin because he \"does not know what a heart attack feels like\" which likely contributed to worsening dizziness and initial hypotension for paramedics.  Blood pressure is already improved upon arrival.  He appears generally well.  He does have some right lower quadrant tenderness on my exam and MAGGY reveals return of maroon-colored blood.  EKG is reassuring without acute ST changes or arrhythmias and troponin is normal.  I doubt a cardiac etiology for his dizziness/lightheadedness which is likely vasovagal in nature.  Laboratory studies are significant for hyperglycemia to 282 and negligible hyperkalemia of 5.4.  Elevated BUN could be related to an upper GI bleed although the blood on rectal exam would suggest a lower GI source.  He did have a colonoscopy in October 2021 revealing diverticulosis and multiple polypectomies only.  There is no pancreatitis, hepatitis, biliary obstruction, lactic acidosis, or meaningful leukocytosis (11.2).  However, he does have a drop in his hemoglobin from baseline of 13 in July 2021 to 9.3 today.  CT does not reveal cause for this GI bleed with only incidental AAA of 3.1 cm noted.  He was treated with IV fluids, Zofran, and Protonix and on repeat evaluation is doing well.  He understands the need for admission given what appears to be acute GI bleed with drop in hemoglobin and near syncope.  All questions answered after we discussed findings.  I discussed the patient with Dr. Newman, hospitalist, who accepts admission and has no further orders.    Diagnosis:    ICD-10-CM    1. Gastrointestinal hemorrhage, unspecified gastrointestinal hemorrhage type  K92.2    2. Near syncope  R55    3. Aneurysm of infrarenal abdominal aorta (H)  I71.4    4. " Normocytic anemia due to blood loss  D50.0          Scribe Disclosure:  I, Uriel Lind, am serving as a scribe at 7:03 AM on 2/3/2022 to document services personally performed by Wendie Vanessa MD based on my observations and the provider's statements to me.    I, Kevin Pierre, am serving as a scribe  on 2/3/2022 at 11:25 AM to document services personally performed by Wendie Vanessa MD based on my observations and the provider's statements to me.         Wendie Vanessa MD  02/04/22 1114

## 2022-02-03 NOTE — PROGRESS NOTES
RECEIVING UNIT ED HANDOFF REVIEW    ED Nurse Handoff Report was reviewed by: Leanne Nogueira RN on February 3, 2022 at 11:04 AM

## 2022-02-03 NOTE — ED NOTES
[facility]  ED Nurse Handoff Report    ED Chief complaint: Dizziness, GI Bleeding, and Gastrophageal Reflux      ED Diagnosis:   Final diagnoses:   None       Code Status: Admitting MD to discuss    Allergies: No Known Allergies    Patient Story: Pt brought in by ambulance from home. Pt reports he awoke at 0300 to use restroom and felt dizzy, Pt had x2 episodes of diarrhea with noticeable blood in stool. EMS reports Pt was having heartburn and took 1 NTG, Pt hypotensive for EMS. EMS gave 324 ASA.  Hx DM2, HTN, and HLD. Pt alert and oriented x4.   Focused Assessment:  Pt reports felling lightheaded and dizzy. Denies N/V. Denies CP.             Treatments and/or interventions provided: 1L NS bolus, 40mg Protonix  Patient's response to treatments and/or interventions: Pt denies N/V, denies abdominal pain.     To be done/followed up on inpatient unit:  Per admitting    Does this patient have any cognitive concerns?: A&O x4    Activity level - Baseline/Home:  Independent  Activity Level - Current:   Independent    Patient's Preferred language: English   Needed?: No    Isolation: None  Infection: Not Applicable  Patient tested for COVID 19 prior to admission: YES  Bariatric?: No    Vital Signs:   Vitals:    02/03/22 0830 02/03/22 0900 02/03/22 0915 02/03/22 0945   BP: 109/56 100/58 107/62 118/72   Pulse: 61 60 74 74   Resp: 12 10 12 11   Temp:       TempSrc:       SpO2: 99% 100% 100% 100%       Cardiac Rhythm:     Was the PSS-3 completed:   Yes  What interventions are required if any?               Family Comments:   OBS brochure/video discussed/provided to patient/family: N/A                  For the majority of the shift this patient's behavior was Green.   Behavioral interventions performed were Calm and cooperative.    ED NURSE PHONE NUMBER: *[phone]

## 2022-02-03 NOTE — PROGRESS NOTES
PRE-PROCEDURE NOTE    CHIEF COMPLAINT / REASON FOR PROCEDURE:  melena    History and Physical Reviewed:  yes    PRE-SEDATION ASSESSMENT:    Lung Exam:  normal  Heart Exam:  normal  Mallampati Airway Classification:  2   Previous reaction to anesthesia/sedation:   no  Sedation plan based on assessment:  moderate  Comment(s):  Risks explained  ASA Classification:  3    IMPRESSION:  Rule out upper GI bleeding    PLAN:  egd    Cookie Staley MD, MD

## 2022-02-04 ENCOUNTER — APPOINTMENT (OUTPATIENT)
Dept: GENERAL RADIOLOGY | Facility: CLINIC | Age: 72
DRG: 377 | End: 2022-02-04
Attending: EMERGENCY MEDICINE
Payer: MEDICARE

## 2022-02-04 ENCOUNTER — HOSPITAL ENCOUNTER (INPATIENT)
Facility: CLINIC | Age: 72
LOS: 4 days | Discharge: HOME | DRG: 377 | End: 2022-02-08
Attending: EMERGENCY MEDICINE | Admitting: INTERNAL MEDICINE
Payer: MEDICARE

## 2022-02-04 VITALS
DIASTOLIC BLOOD PRESSURE: 60 MMHG | RESPIRATION RATE: 16 BRPM | OXYGEN SATURATION: 98 % | HEART RATE: 64 BPM | SYSTOLIC BLOOD PRESSURE: 99 MMHG | TEMPERATURE: 97.6 F

## 2022-02-04 DIAGNOSIS — K92.2 GASTROINTESTINAL HEMORRHAGE, UNSPECIFIED GASTROINTESTINAL HEMORRHAGE TYPE: ICD-10-CM

## 2022-02-04 PROBLEM — D62 ACUTE BLOOD LOSS ANEMIA: Status: ACTIVE | Noted: 2022-02-04

## 2022-02-04 PROBLEM — K29.81 DUODENITIS WITH BLEEDING: Status: ACTIVE | Noted: 2022-02-04

## 2022-02-04 PROBLEM — K29.81 DUODENITIS WITH HEMORRHAGE: Status: ACTIVE | Noted: 2022-02-04

## 2022-02-04 PROBLEM — E78.5 HYPERLIPIDEMIA LDL GOAL <70: Status: ACTIVE | Noted: 2021-07-12

## 2022-02-04 PROBLEM — I71.40 AAA (ABDOMINAL AORTIC ANEURYSM) (H): Status: ACTIVE | Noted: 2022-02-04

## 2022-02-04 PROBLEM — I10 ESSENTIAL HYPERTENSION: Status: ACTIVE | Noted: 2021-07-12

## 2022-02-04 PROBLEM — E87.5 HYPERKALEMIA: Status: ACTIVE | Noted: 2022-02-04

## 2022-02-04 LAB
ABO/RH(D): NORMAL
ALBUMIN SERPL-MCNC: 2.9 G/DL (ref 3.4–5)
ALP SERPL-CCNC: 36 U/L (ref 40–150)
ALT SERPL W P-5'-P-CCNC: 17 U/L (ref 0–70)
ANION GAP SERPL CALCULATED.3IONS-SCNC: 3 MMOL/L (ref 3–14)
ANION GAP SERPL CALCULATED.3IONS-SCNC: 4 MMOL/L (ref 3–14)
ANTIBODY SCREEN: NEGATIVE
AST SERPL W P-5'-P-CCNC: 12 U/L (ref 0–45)
ATRIAL RATE - MUSE: 103 BPM
BASOPHILS # BLD AUTO: 0.1 10E3/UL (ref 0–0.2)
BASOPHILS NFR BLD AUTO: 0 %
BILIRUB SERPL-MCNC: 0.2 MG/DL (ref 0.2–1.3)
BLD PROD TYP BPU: NORMAL
BLOOD COMPONENT TYPE: NORMAL
BUN SERPL-MCNC: 12 MG/DL (ref 7–30)
BUN SERPL-MCNC: 15 MG/DL (ref 7–30)
CALCIUM SERPL-MCNC: 7.7 MG/DL (ref 8.5–10.1)
CALCIUM SERPL-MCNC: 8.1 MG/DL (ref 8.5–10.1)
CHLORIDE BLD-SCNC: 107 MMOL/L (ref 94–109)
CHLORIDE BLD-SCNC: 108 MMOL/L (ref 94–109)
CO2 SERPL-SCNC: 25 MMOL/L (ref 20–32)
CO2 SERPL-SCNC: 26 MMOL/L (ref 20–32)
CODING SYSTEM: NORMAL
COLONOSCOPY: NORMAL
CPB POCT: NO
CREAT SERPL-MCNC: 0.75 MG/DL (ref 0.66–1.25)
CREAT SERPL-MCNC: 0.84 MG/DL (ref 0.66–1.25)
CROSSMATCH: NORMAL
DIASTOLIC BLOOD PRESSURE - MUSE: NORMAL MMHG
EOSINOPHIL # BLD AUTO: 0.3 10E3/UL (ref 0–0.7)
EOSINOPHIL NFR BLD AUTO: 2 %
ERYTHROCYTE [DISTWIDTH] IN BLOOD BY AUTOMATED COUNT: 13.5 % (ref 10–15)
ERYTHROCYTE [DISTWIDTH] IN BLOOD BY AUTOMATED COUNT: 13.6 % (ref 10–15)
GFR SERPL CREATININE-BSD FRML MDRD: >90 ML/MIN/1.73M2
GFR SERPL CREATININE-BSD FRML MDRD: >90 ML/MIN/1.73M2
GLUCOSE BLD-MCNC: 190 MG/DL (ref 70–99)
GLUCOSE BLD-MCNC: 248 MG/DL (ref 70–99)
GLUCOSE BLDC GLUCOMTR-MCNC: 138 MG/DL (ref 70–99)
GLUCOSE BLDC GLUCOMTR-MCNC: 152 MG/DL (ref 70–99)
GLUCOSE BLDC GLUCOMTR-MCNC: 161 MG/DL (ref 70–99)
GLUCOSE BLDC GLUCOMTR-MCNC: 254 MG/DL (ref 70–99)
HBA1C MFR BLD: 7.4 % (ref 0–5.6)
HCO3 BLDV-SCNC: 24 MMOL/L (ref 21–28)
HCT VFR BLD AUTO: 19.1 % (ref 40–53)
HCT VFR BLD AUTO: 23.7 % (ref 40–53)
HCT VFR BLD CALC: 18 % (ref 40–53)
HGB BLD-MCNC: 6.1 G/DL (ref 13.3–17.7)
HGB BLD-MCNC: 6.2 G/DL (ref 13.3–17.7)
HGB BLD-MCNC: 7.2 G/DL (ref 13.3–17.7)
HGB BLD-MCNC: 7.7 G/DL (ref 13.3–17.7)
HGB BLD-MCNC: 7.7 G/DL (ref 13.3–17.7)
HOLD SPECIMEN: NORMAL
IMM GRANULOCYTES # BLD: 0.1 10E3/UL
IMM GRANULOCYTES NFR BLD: 1 %
INR PPP: 1.29 (ref 0.85–1.15)
INTERPRETATION ECG - MUSE: NORMAL
ISSUE DATE AND TIME: NORMAL
LYMPHOCYTES # BLD AUTO: 1.9 10E3/UL (ref 0.8–5.3)
LYMPHOCYTES NFR BLD AUTO: 13 %
MCH RBC QN AUTO: 29.2 PG (ref 26.5–33)
MCH RBC QN AUTO: 30.4 PG (ref 26.5–33)
MCHC RBC AUTO-ENTMCNC: 32.5 G/DL (ref 31.5–36.5)
MCHC RBC AUTO-ENTMCNC: 32.5 G/DL (ref 31.5–36.5)
MCV RBC AUTO: 90 FL (ref 78–100)
MCV RBC AUTO: 94 FL (ref 78–100)
MONOCYTES # BLD AUTO: 1.2 10E3/UL (ref 0–1.3)
MONOCYTES NFR BLD AUTO: 8 %
NEUTROPHILS # BLD AUTO: 10.9 10E3/UL (ref 1.6–8.3)
NEUTROPHILS NFR BLD AUTO: 76 %
NRBC # BLD AUTO: 0 10E3/UL
NRBC BLD AUTO-RTO: 0 /100
P AXIS - MUSE: 68 DEGREES
PCO2 BLDV: 44 MM HG (ref 40–50)
PH BLDV: 7.34 [PH] (ref 7.32–7.43)
PLATELET # BLD AUTO: 190 10E3/UL (ref 150–450)
PLATELET # BLD AUTO: 192 10E3/UL (ref 150–450)
PO2 BLDV: 21 MM HG (ref 25–47)
POTASSIUM BLD-SCNC: 3.8 MMOL/L (ref 3.4–5.3)
POTASSIUM BLD-SCNC: 3.9 MMOL/L (ref 3.4–5.3)
POTASSIUM BLD-SCNC: 3.9 MMOL/L (ref 3.4–5.3)
PR INTERVAL - MUSE: 150 MS
PROT SERPL-MCNC: 5.4 G/DL (ref 6.8–8.8)
QRS DURATION - MUSE: 86 MS
QT - MUSE: 334 MS
QTC - MUSE: 437 MS
R AXIS - MUSE: 47 DEGREES
RBC # BLD AUTO: 2.04 10E6/UL (ref 4.4–5.9)
RBC # BLD AUTO: 2.64 10E6/UL (ref 4.4–5.9)
SAO2 % BLDV: 31 % (ref 94–100)
SARS-COV-2 RNA RESP QL NAA+PROBE: NEGATIVE
SODIUM BLD-SCNC: 138 MMOL/L (ref 133–144)
SODIUM SERPL-SCNC: 136 MMOL/L (ref 133–144)
SODIUM SERPL-SCNC: 137 MMOL/L (ref 133–144)
SPECIMEN EXPIRATION DATE: NORMAL
SYSTOLIC BLOOD PRESSURE - MUSE: NORMAL MMHG
T AXIS - MUSE: 62 DEGREES
TROPONIN I SERPL HS-MCNC: 8 NG/L
UNIT ABO/RH: NORMAL
UNIT NUMBER: NORMAL
UNIT STATUS: NORMAL
UNIT TYPE ISBT: 6200
VENTRICULAR RATE- MUSE: 103 BPM
WBC # BLD AUTO: 14.4 10E3/UL (ref 4–11)
WBC # BLD AUTO: 5.8 10E3/UL (ref 4–11)

## 2022-02-04 PROCEDURE — 96361 HYDRATE IV INFUSION ADD-ON: CPT

## 2022-02-04 PROCEDURE — 86850 RBC ANTIBODY SCREEN: CPT | Performed by: EMERGENCY MEDICINE

## 2022-02-04 PROCEDURE — C9113 INJ PANTOPRAZOLE SODIUM, VIA: HCPCS | Performed by: EMERGENCY MEDICINE

## 2022-02-04 PROCEDURE — 85027 COMPLETE CBC AUTOMATED: CPT | Performed by: EMERGENCY MEDICINE

## 2022-02-04 PROCEDURE — 99223 1ST HOSP IP/OBS HIGH 75: CPT | Mod: AI | Performed by: INTERNAL MEDICINE

## 2022-02-04 PROCEDURE — 250N000011 HC RX IP 250 OP 636: Performed by: EMERGENCY MEDICINE

## 2022-02-04 PROCEDURE — 93005 ELECTROCARDIOGRAM TRACING: CPT

## 2022-02-04 PROCEDURE — 36430 TRANSFUSION BLD/BLD COMPNT: CPT

## 2022-02-04 PROCEDURE — 0DJD8ZZ INSPECTION OF LOWER INTESTINAL TRACT, VIA NATURAL OR ARTIFICIAL OPENING ENDOSCOPIC: ICD-10-PCS | Performed by: INTERNAL MEDICINE

## 2022-02-04 PROCEDURE — 258N000003 HC RX IP 258 OP 636: Performed by: EMERGENCY MEDICINE

## 2022-02-04 PROCEDURE — 84484 ASSAY OF TROPONIN QUANT: CPT | Performed by: EMERGENCY MEDICINE

## 2022-02-04 PROCEDURE — 83036 HEMOGLOBIN GLYCOSYLATED A1C: CPT | Performed by: INTERNAL MEDICINE

## 2022-02-04 PROCEDURE — 99291 CRITICAL CARE FIRST HOUR: CPT | Mod: 25

## 2022-02-04 PROCEDURE — 250N000011 HC RX IP 250 OP 636: Performed by: INTERNAL MEDICINE

## 2022-02-04 PROCEDURE — 96366 THER/PROPH/DIAG IV INF ADDON: CPT

## 2022-02-04 PROCEDURE — 45378 DIAGNOSTIC COLONOSCOPY: CPT | Performed by: INTERNAL MEDICINE

## 2022-02-04 PROCEDURE — 258N000003 HC RX IP 258 OP 636: Performed by: PHYSICIAN ASSISTANT

## 2022-02-04 PROCEDURE — 96375 TX/PRO/DX INJ NEW DRUG ADDON: CPT | Mod: 59

## 2022-02-04 PROCEDURE — 96365 THER/PROPH/DIAG IV INF INIT: CPT

## 2022-02-04 PROCEDURE — 86923 COMPATIBILITY TEST ELECTRIC: CPT | Performed by: INTERNAL MEDICINE

## 2022-02-04 PROCEDURE — C9803 HOPD COVID-19 SPEC COLLECT: HCPCS

## 2022-02-04 PROCEDURE — 250N000013 HC RX MED GY IP 250 OP 250 PS 637: Performed by: INTERNAL MEDICINE

## 2022-02-04 PROCEDURE — G0500 MOD SEDAT ENDO SERVICE >5YRS: HCPCS | Performed by: INTERNAL MEDICINE

## 2022-02-04 PROCEDURE — 258N000003 HC RX IP 258 OP 636: Performed by: INTERNAL MEDICINE

## 2022-02-04 PROCEDURE — 96376 TX/PRO/DX INJ SAME DRUG ADON: CPT

## 2022-02-04 PROCEDURE — 84295 ASSAY OF SERUM SODIUM: CPT

## 2022-02-04 PROCEDURE — 85610 PROTHROMBIN TIME: CPT | Performed by: EMERGENCY MEDICINE

## 2022-02-04 PROCEDURE — 36415 COLL VENOUS BLD VENIPUNCTURE: CPT | Performed by: EMERGENCY MEDICINE

## 2022-02-04 PROCEDURE — 85018 HEMOGLOBIN: CPT | Performed by: PHYSICIAN ASSISTANT

## 2022-02-04 PROCEDURE — 82310 ASSAY OF CALCIUM: CPT | Performed by: PHYSICIAN ASSISTANT

## 2022-02-04 PROCEDURE — P9016 RBC LEUKOCYTES REDUCED: HCPCS | Performed by: EMERGENCY MEDICINE

## 2022-02-04 PROCEDURE — G0378 HOSPITAL OBSERVATION PER HR: HCPCS

## 2022-02-04 PROCEDURE — 86901 BLOOD TYPING SEROLOGIC RH(D): CPT | Performed by: EMERGENCY MEDICINE

## 2022-02-04 PROCEDURE — 71046 X-RAY EXAM CHEST 2 VIEWS: CPT

## 2022-02-04 PROCEDURE — 120N000001 HC R&B MED SURG/OB

## 2022-02-04 PROCEDURE — 99217 PR OBSERVATION CARE DISCHARGE: CPT | Performed by: INTERNAL MEDICINE

## 2022-02-04 PROCEDURE — 82803 BLOOD GASES ANY COMBINATION: CPT

## 2022-02-04 PROCEDURE — 36415 COLL VENOUS BLD VENIPUNCTURE: CPT | Performed by: PHYSICIAN ASSISTANT

## 2022-02-04 PROCEDURE — 87635 SARS-COV-2 COVID-19 AMP PRB: CPT | Performed by: EMERGENCY MEDICINE

## 2022-02-04 PROCEDURE — 96375 TX/PRO/DX INJ NEW DRUG ADDON: CPT

## 2022-02-04 PROCEDURE — 85027 COMPLETE CBC AUTOMATED: CPT | Performed by: PHYSICIAN ASSISTANT

## 2022-02-04 PROCEDURE — 99153 MOD SED SAME PHYS/QHP EA: CPT | Performed by: INTERNAL MEDICINE

## 2022-02-04 RX ORDER — DEXTROSE MONOHYDRATE 25 G/50ML
25-50 INJECTION, SOLUTION INTRAVENOUS
Status: DISCONTINUED | OUTPATIENT
Start: 2022-02-04 | End: 2022-02-08 | Stop reason: HOSPADM

## 2022-02-04 RX ORDER — ONDANSETRON 2 MG/ML
4 INJECTION INTRAMUSCULAR; INTRAVENOUS ONCE
Status: COMPLETED | OUTPATIENT
Start: 2022-02-04 | End: 2022-02-04

## 2022-02-04 RX ORDER — PANTOPRAZOLE SODIUM 40 MG/1
40 TABLET, DELAYED RELEASE ORAL
Status: DISCONTINUED | OUTPATIENT
Start: 2022-02-04 | End: 2022-02-04 | Stop reason: HOSPADM

## 2022-02-04 RX ORDER — FENTANYL CITRATE 50 UG/ML
INJECTION, SOLUTION INTRAMUSCULAR; INTRAVENOUS PRN
Status: COMPLETED | OUTPATIENT
Start: 2022-02-04 | End: 2022-02-04

## 2022-02-04 RX ORDER — ACETAMINOPHEN 325 MG/1
650 TABLET ORAL EVERY 6 HOURS PRN
Refills: 0
Start: 2022-02-04 | End: 2022-07-25

## 2022-02-04 RX ORDER — PIPERACILLIN SODIUM, TAZOBACTAM SODIUM 3; .375 G/15ML; G/15ML
3.38 INJECTION, POWDER, LYOPHILIZED, FOR SOLUTION INTRAVENOUS EVERY 6 HOURS
Status: DISCONTINUED | OUTPATIENT
Start: 2022-02-04 | End: 2022-02-06

## 2022-02-04 RX ORDER — ASPIRIN 81 MG/1
81 TABLET, CHEWABLE ORAL DAILY
Refills: 0 | Status: ON HOLD
Start: 2022-02-04 | End: 2022-02-08

## 2022-02-04 RX ORDER — NICOTINE POLACRILEX 4 MG
15-30 LOZENGE BUCCAL
Status: DISCONTINUED | OUTPATIENT
Start: 2022-02-04 | End: 2022-02-08 | Stop reason: HOSPADM

## 2022-02-04 RX ORDER — PANTOPRAZOLE SODIUM 40 MG/1
40 TABLET, DELAYED RELEASE ORAL
Qty: 60 TABLET | Refills: 1 | Status: SHIPPED | OUTPATIENT
Start: 2022-02-04 | End: 2022-03-10

## 2022-02-04 RX ORDER — ONDANSETRON 2 MG/ML
4 INJECTION INTRAMUSCULAR; INTRAVENOUS
Status: DISCONTINUED | OUTPATIENT
Start: 2022-02-04 | End: 2022-02-04 | Stop reason: HOSPADM

## 2022-02-04 RX ORDER — MULTIVIT WITH MINERALS/LUTEIN
500 TABLET ORAL DAILY
Qty: 60 TABLET | Refills: 0 | Status: SHIPPED | OUTPATIENT
Start: 2022-02-04 | End: 2022-02-17

## 2022-02-04 RX ORDER — FERROUS SULFATE 325(65) MG
325 TABLET ORAL
Qty: 60 TABLET | Refills: 1 | Status: SHIPPED | OUTPATIENT
Start: 2022-02-04 | End: 2022-02-17

## 2022-02-04 RX ADMIN — ONDANSETRON 4 MG: 2 INJECTION INTRAMUSCULAR; INTRAVENOUS at 19:47

## 2022-02-04 RX ADMIN — SODIUM CHLORIDE: 9 INJECTION, SOLUTION INTRAVENOUS at 11:39

## 2022-02-04 RX ADMIN — MAGNESIUM CITRATE 296 ML: 1.75 LIQUID ORAL at 05:12

## 2022-02-04 RX ADMIN — SODIUM CHLORIDE 1000 ML: 9 INJECTION, SOLUTION INTRAVENOUS at 19:47

## 2022-02-04 RX ADMIN — MIDAZOLAM 1 MG: 1 INJECTION INTRAMUSCULAR; INTRAVENOUS at 12:58

## 2022-02-04 RX ADMIN — FENTANYL CITRATE 100 MCG: 50 INJECTION, SOLUTION INTRAMUSCULAR; INTRAVENOUS at 12:46

## 2022-02-04 RX ADMIN — IRON SUCROSE 300 MG: 20 INJECTION, SOLUTION INTRAVENOUS at 14:28

## 2022-02-04 RX ADMIN — SODIUM CHLORIDE 8 MG/HR: 9 INJECTION, SOLUTION INTRAVENOUS at 20:40

## 2022-02-04 RX ADMIN — MIDAZOLAM 2 MG: 1 INJECTION INTRAMUSCULAR; INTRAVENOUS at 12:46

## 2022-02-04 RX ADMIN — PANTOPRAZOLE SODIUM 80 MG: 40 INJECTION, POWDER, FOR SOLUTION INTRAVENOUS at 20:36

## 2022-02-04 RX ADMIN — PANTOPRAZOLE SODIUM 40 MG: 40 TABLET, DELAYED RELEASE ORAL at 08:29

## 2022-02-04 ASSESSMENT — ACTIVITIES OF DAILY LIVING (ADL)
ADLS_ACUITY_SCORE: 3
ADLS_ACUITY_SCORE: 3
ADLS_ACUITY_SCORE: 12
ADLS_ACUITY_SCORE: 3
ADLS_ACUITY_SCORE: 3
ADLS_ACUITY_SCORE: 12
ADLS_ACUITY_SCORE: 3

## 2022-02-04 ASSESSMENT — ENCOUNTER SYMPTOMS
NAUSEA: 1
ACTIVITY CHANGE: 0
LIGHT-HEADEDNESS: 1
VOMITING: 0
VOMITING: 1

## 2022-02-04 NOTE — PLAN OF CARE
Shift Note:  Hgb 7.2 at 0029 recheck this am drawn and pending.Compa bloody stools persist. Mild abd pain, no meds needed.  Up with SBA d/t dizziness. EGD done, mild erosions, but MD wants colonoscopy (prepping now) to investigate bleeding. Jyotsna cl liquid well, no nausea.

## 2022-02-04 NOTE — DISCHARGE SUMMARY
Discharge Note    Patient discharged to home via private vehicle  accompanied by significant other .  IV: Discontinued  Prescriptions filled and given to patient/family.   Belongings reviewed and sent with patient.   Home medications returned to patient: NA  Equipment sent with: patient.   patient verbalizes understanding of discharge instructions. AVS given to patient.

## 2022-02-04 NOTE — PROGRESS NOTES
"Writer met with the patient and spouse at the bedside. Provided the patient with \"what is outpatient observation\" brochure and answered questions.  Patient is currently receiving an Iron infusion and will discharge to home after completion.  Patient and spouse identify no further needs at discharge.   "

## 2022-02-04 NOTE — DISCHARGE SUMMARY
[facility]    Discharge Summary  Hospitalist    Date of Admission:  2/3/2022  Date of Discharge:  2/4/2022  5:40 PM  Discharging Provider: Manolo Rios MD    Discharge Diagnoses   Principal Problem:    Duodenitis with hemorrhage  Active Problems:    DM 2 w nephropathy, Hgb A1C 6.4 on 10/13/21    CAD -- S/P HUNG x 2 in 2016    Essential hypertension    Hyperlipidemia LDL goal <70    Acute blood loss anemia    Hyperkalemia    AAA -- 3.1 cm on Abd CT 2/3/22      History of Present Illness   71 year old male with a past medical history significant for coronary artery disease s/p stenting 2016, T2DM, HTN, HLD, hx tobacco use, diverticulosis who was admitted on 2/3/2022 after presenting to the ED with dizziness hematochezia.  The patient reports he was in his usual state of health when he went to bed the evening prior to admission.  He had lasagna and 2 glasses of wine for dinner and woke up in the middle the night with some acid reflux symptoms.  He took some baking soda with little improvement and went to the bathroom where he had an episode of diarrhea.  He reports he did not look closely but felt this was dark and possibly bloody.  He went back to bed and when he woke up again this morning had another episode of diarrhea with associated lightheadedness, nausea.  He describes stool as loose, dark red/maroon with clots.  Shortly after this he felt very close to passing out.  He had some mild left arm pain and was concerned that his symptoms may be related to his heart (no chest pain or shortness of breath at the time) and took a nitroglycerin.  He yelled for his wife who called EMS.  On arrival patient was reportedly hypotensive in the 70s with blood sugars in the 400s.  EMS gave aspirin and patient was brought to the emergency department for further evaluation.  Work-up revealed hemoglobin of 9.3 with elevated BUN and potassium of 5.4.  He had some tenderness on exam and CT of the  abdomen and pelvis was pursued which was negative for acute findings but did show an infrarenal abdominal aortic aneurysm.  He was given Protonix for suspected GI bleed. He does not take NSAIDs regularly.    Hospital Course   Given IV fluids, Hgb did drop to 7.3, but overall he felt better once hydrated.  He did have an EGD which showed area of duodenitis, which was felt to be probable source of UGI bleed, but no bleeding at this time.  To be certain, he was prepped for a colonoscopy and did have maroon stool pass with that, but no further bleeding noted after prep completed, and colonoscopy did not show any bleeding.      His hgb at discharge was 7.7, and he was tolerating a regular diet, and asking if he could play softball -- but encouraged to avoid stressful activity for 1 week.  He was on Ecotrin 81 mg daily, but told to switch to chewable aspirin 81 mg daily and take with food, Protonix 40 mg bid for 2 months, then probably decrease to 40 mg daily, avoid NSAID's, and limit alcohol to 2-3 glasses of wine a week (currently has 2 glasses daily).      Manolo Rios MD  Pager: [phone]  Cell Phone:  [phone]       Significant Results and Procedures   As above    Pending Results   These results will be followed up by Dr. Rios  Unresulted Labs Ordered in the Past 30 Days of this Admission     No orders found for last 31 day(s).          Code Status   Full Code       Primary Care Physician   Markel Kauffman    Physical Exam   Temp: 97.4  F (36.3  C) Temp src: Oral BP: 91/68 Pulse: 68   Resp: 15 SpO2: 99 % O2 Device: None (Room air)    There were no vitals filed for this visit.  Vital Signs with Ranges  Temp:  [97.4  F (36.3  C)-98  F (36.7  C)] 97.4  F (36.3  C)  Pulse:  [60-88] 68  Resp:  [6-24] 15  BP: ()/(41-68) 91/68  SpO2:  [95 %-100 %] 99 %  I/O last 3 completed shifts:  In: -   Out: 750 [Urine:750]    Exam on discharge:   Lungs clear  CV with reg S1S2    Discharge Disposition    Discharged to home  Condition at discharge: Good    Consultations This Hospital Stay   GASTROENTEROLOGY IP CONSULT    Time Spent on this Encounter   I spent a total of 25 minutes discharging this patient.     Discharge Orders   No discharge procedures on file.  Discharge Medications   Current Discharge Medication List      CONTINUE these medications which have NOT CHANGED    Details   amLODIPine (NORVASC) 5 MG tablet TAKE 1 TABLET EVERY DAY  Qty: 90 tablet, Refills: 1    Associated Diagnoses: Essential hypertension with goal blood pressure less than 140/90      aspirin 81 MG tablet Take 1 tablet by mouth daily      atorvastatin (LIPITOR) 40 MG tablet TAKE 1 TABLET EVERY DAY  Qty: 90 tablet, Refills: 3    Associated Diagnoses: Hyperlipidemia LDL goal <70      glimepiride (AMARYL) 2 MG tablet TAKE 1 TABLET EVERY MORNING BEFORE BREAKFAST (NEED 6 MONTH LAB TO CHECK A1C AND OFFICE VISIT BEFORE MORE REFILLS)  Qty: 90 tablet, Refills: 3    Associated Diagnoses: Type 2 diabetes mellitus with diabetic nephropathy, without long-term current use of insulin (H)      losartan (COZAAR) 100 MG tablet Take 1 tablet (100 mg) by mouth daily  Qty: 90 tablet, Refills: 3    Associated Diagnoses: Essential hypertension      metFORMIN (GLUCOPHAGE) 1000 MG tablet Take 1 tablet (1,000 mg) by mouth 2 times daily (with meals)  Qty: 180 tablet, Refills: 3    Associated Diagnoses: Type 2 diabetes mellitus with diabetic nephropathy, without long-term current use of insulin (H)      omega 3 1000 MG CAPS Take 1 g by mouth daily  Qty: 90 capsule    Associated Diagnoses: Routine medical exam      ACCU-CHEK RYAN PLUS test strip USE TO TEST BLOOD SUGAR TWICE DAILY  Qty: 200 strip, Refills: 1    Associated Diagnoses: Type 2 diabetes mellitus with diabetic neuropathy (H)      blood glucose monitoring (ACCU-CHEK MULTICLIX) lancets by In Vitro route 2 times daily  Qty: 2 Box, Refills: 1    Comments: 200 each  Associated Diagnoses: Type 2 diabetes  mellitus with diabetic neuropathy (H)      NITROGLYCERIN SL Place 0.4 mg under the tongue as needed for chest pain      sildenafil (VIAGRA) 100 MG tablet Take 1 tablet (100 mg) by mouth daily as needed (erectile dysfunction)  Qty: 30 tablet, Refills: 3    Associated Diagnoses: Erectile dysfunction, unspecified erectile dysfunction type           Allergies   No Known Allergies  Data   Most Recent 3 CBC's:Recent Labs   Lab Test 02/04/22 1948 02/04/22 1947 02/04/22  0547 02/03/22  1214 02/03/22  0736   WBC 14.4*  --  5.8  --  11.2*   HGB 6.2* 6.1* 7.7*  7.7*   < > 9.3*   MCV 94  --  90  --  92     --  192  --  229    < > = values in this interval not displayed.      Most Recent 3 BMP's:  Recent Labs   Lab Test 02/04/22 1948 02/04/22 1947 02/04/22 1945 02/04/22  1433 02/04/22  0610 02/04/22  0547 02/03/22  1439 02/03/22  0736    138  --   --   --  137  --  133   POTASSIUM 3.8 3.9  --   --   --  3.9   < > 5.4*   CHLORIDE 107  --   --   --   --  108  --  103   CO2 26  --   --   --   --  25  --  27   BUN 12  --   --   --   --  15  --  31*   CR 0.84  --   --   --   --  0.75  --  0.80   ANIONGAP 3  --   --   --   --  4  --  3   VALENTIN 7.7*  --   --   --   --  8.1*  --  8.2*   *  --  254* 152*   < > 190*   < > 282*    < > = values in this interval not displayed.     Most Recent 2 LFT's:  Recent Labs   Lab Test 02/04/22 1948 02/03/22  0736   AST 12 11   ALT 17 21   ALKPHOS 36* 42   BILITOTAL 0.2 0.3     Most Recent INR's and Anticoagulation Dosing History:  Anticoagulation Dose History     Recent Dosing and Labs Latest Ref Rng & Units 2/3/2022 2/4/2022    INR 0.85 - 1.15 1.17(H) 1.29(H)        Most Recent 3 Troponin's:No lab results found.  Most Recent Cholesterol Panel:  Recent Labs   Lab Test 07/12/21  0918   CHOL 131   LDL 42   HDL 79   TRIG 51     Most Recent 6 Bacteria Isolates From Any Culture (See EPIC Reports for Culture Details):No lab results found.  Most Recent TSH, T4 and A1c Labs:  Recent  Labs   Lab Test 10/13/21  0902 09/06/19  1114 08/31/18  0821   TSH  --   --  1.73   A1C 6.4*   < > 6.5*    < > = values in this interval not displayed.

## 2022-02-04 NOTE — PROVIDER NOTIFICATION
MD Notification    Notified Person: MD    Notified Person Name: Dr. Olivera    Notification Date/Time: 2/4/2022 2:45 PM     Notification Interaction: vocera message    Purpose of Notification: Pt started on regular diet post colonoscopy, can we change BG and sliding scale insulin from q4hrs to AC/HS?    Orders Received: yes, MD will put in orders.    Comments:    Pt and wife requesting to have hgb checked prior to discharge, per MD, unnecessary at this time and they have MD cell number should any questions arise.

## 2022-02-04 NOTE — PROGRESS NOTES
[facility] Brief Note    Colonoscopy completed.    Findings:   - no active bleeding the colon  - left sided diverticula with old blood  - light brown fluid in the right colon and terminal ileum    A/P:  Very likely a left sided diverticular bleed that has now stopped.    - resume diet  - if rebleeding, please obtain a CT Angiogram and if positive an Interventional Radiology consult  - take ASA with food   - omeprazole 40mg daily given the duodenitis seen on the EGD  - IV iron infusion prior to discharge (ordered venofer  - oral iron replacement tablet every other day for about a month, he should follow up with his PCP for hemoglobin check in a week or so    If tolerating diet, can likely discharge home late today or tomorrow morning.    Cookie Staley MD  [facility]  [phone]    We will not actively follow, please call with questions and we would be happy to see the patient again.    Cookie Staley MD  [facility]  [phone] cell

## 2022-02-04 NOTE — PROGRESS NOTES
PRE-PROCEDURE NOTE    CHIEF COMPLAINT / REASON FOR PROCEDURE:  hematochezia    History and Physical Reviewed:  yes    PRE-SEDATION ASSESSMENT:    Lung Exam:  normal  Heart Exam:  normal  Mallampati Airway Classification:  3   Previous reaction to anesthesia/sedation:   no  Sedation plan based on assessment:  moderate  Comment(s):  Risks explained  ASA Classification:  3    IMPRESSION:  Rule out lower GI bleeding source    PLAN:  colonoscopy    Cookie Staley MD, MD

## 2022-02-04 NOTE — PLAN OF CARE
Hgb 8.3, tx if <7. Bloody stools persist. Mild abd pain, no meds needed.  Up with SBA d/t dizziness. EGD done, mild erosions, but MD wants colonoscopy (prepping now) to investigate bleeding. Jyotsna cl liquid well, no nausea. Hypotensive, 70-80's/50, but better now.

## 2022-02-04 NOTE — UTILIZATION REVIEW
Admission Status; Secondary Review Determination       Under the authority of the Utilization Management Committee, the utilization review process indicated a secondary review on the above patient. The review outcome is based on review of the medical records, discussions with staff, and applying clinical experience noted on the date of the review.     (x) Inpatient Status Appropriate - This patient's medical care is consistent with medical management for inpatient care and reasonable inpatient medical practice.     RATIONALE FOR DETERMINATION     Jakub Robles is a 71 year old male with history of coronary artery disease s/p stenting 2016, T2DM, HTN, HLD, hx tobacco use, and diverticulosis.  He presented to the [facility] emergency department on 2/3/2022 after presenting to the ED with dizziness and hematochezia.  Emergency department evaluation showed stable vital signs.  Laboratory evaluation showed potassium of 5.4, white blood cells 11.2, hemoglobin 9.3, INR 1.17, and otherwise unremarkable comprehensive metabolic panel and CBC.  Lipase and troponin were unremarkable. Of note, last hemoglobin prior to this presentation was 13.79 7/12/2021.  Critical was admitted to the hospital for further evaluation acute GI bleed and acute blood loss anemia.  He was seen promptly by gastroenterology and upper endoscopy was performed.  This showed esophagitis but this was not thought to be cause of bleeding.  Colonoscopy prep with subsequent colonoscopy was recommended and planned for 2/4/2022.  Overnight after admission hemoglobin continued to decline to as low as 7.3.  Hospital course was complicated by hypotension with blood pressure as low as 79/54.  Inpatient admission is appropriate for further evaluation and monitoring of hematochezia, and acute blood loss anemia, and hypotension.      At the time of admission with the information available to the attending physician more than 2 nights Hospital  complex care was anticipated, based on patient risk of adverse outcome if treated as outpatient and complex care required. Inpatient admission is appropriate based on the Medicare guidelines.     This document was produced using voice recognition software       The information on this document is developed by the utilization review team in order for the business office to ensure compliance. This only denotes the appropriateness of proper admission status and does not reflect the quality of care rendered.   The definitions of Inpatient Status and Observation Status used in making the determination above are those provided in the CMS Coverage Manual, Chapter 1 and Chapter 6, section 70.4.   Sincerely,     Colten Nunez MD    Utilization Review  Physician Advisor  [facility].

## 2022-02-05 LAB
ANION GAP SERPL CALCULATED.3IONS-SCNC: 4 MMOL/L (ref 3–14)
BLD PROD TYP BPU: NORMAL
BLOOD COMPONENT TYPE: NORMAL
BUN SERPL-MCNC: 14 MG/DL (ref 7–30)
CALCIUM SERPL-MCNC: 7.4 MG/DL (ref 8.5–10.1)
CHLORIDE BLD-SCNC: 109 MMOL/L (ref 94–109)
CO2 SERPL-SCNC: 24 MMOL/L (ref 20–32)
CODING SYSTEM: NORMAL
CREAT SERPL-MCNC: 0.89 MG/DL (ref 0.66–1.25)
CROSSMATCH: NORMAL
ERYTHROCYTE [DISTWIDTH] IN BLOOD BY AUTOMATED COUNT: 13.8 % (ref 10–15)
GFR SERPL CREATININE-BSD FRML MDRD: >90 ML/MIN/1.73M2
GLUCOSE BLD-MCNC: 191 MG/DL (ref 70–99)
GLUCOSE BLDC GLUCOMTR-MCNC: 151 MG/DL (ref 70–99)
GLUCOSE BLDC GLUCOMTR-MCNC: 155 MG/DL (ref 70–99)
GLUCOSE BLDC GLUCOMTR-MCNC: 156 MG/DL (ref 70–99)
GLUCOSE BLDC GLUCOMTR-MCNC: 173 MG/DL (ref 70–99)
GLUCOSE BLDC GLUCOMTR-MCNC: 221 MG/DL (ref 70–99)
GLUCOSE BLDC GLUCOMTR-MCNC: 250 MG/DL (ref 70–99)
HCT VFR BLD AUTO: 21 % (ref 40–53)
HGB BLD-MCNC: 5.7 G/DL (ref 13.3–17.7)
HGB BLD-MCNC: 6.6 G/DL (ref 13.3–17.7)
HGB BLD-MCNC: 7 G/DL (ref 13.3–17.7)
HGB BLD-MCNC: 7.5 G/DL (ref 13.3–17.7)
ISSUE DATE AND TIME: NORMAL
MCH RBC QN AUTO: 30.3 PG (ref 26.5–33)
MCHC RBC AUTO-ENTMCNC: 33.3 G/DL (ref 31.5–36.5)
MCV RBC AUTO: 91 FL (ref 78–100)
PLATELET # BLD AUTO: 144 10E3/UL (ref 150–450)
POTASSIUM BLD-SCNC: 3.8 MMOL/L (ref 3.4–5.3)
RBC # BLD AUTO: 2.31 10E6/UL (ref 4.4–5.9)
SODIUM SERPL-SCNC: 137 MMOL/L (ref 133–144)
UNIT ABO/RH: NORMAL
UNIT NUMBER: NORMAL
UNIT STATUS: NORMAL
UNIT TYPE ISBT: 6200
WBC # BLD AUTO: 13.7 10E3/UL (ref 4–11)

## 2022-02-05 PROCEDURE — 85018 HEMOGLOBIN: CPT | Performed by: INTERNAL MEDICINE

## 2022-02-05 PROCEDURE — 36415 COLL VENOUS BLD VENIPUNCTURE: CPT | Performed by: INTERNAL MEDICINE

## 2022-02-05 PROCEDURE — 85027 COMPLETE CBC AUTOMATED: CPT | Performed by: INTERNAL MEDICINE

## 2022-02-05 PROCEDURE — 250N000012 HC RX MED GY IP 250 OP 636 PS 637: Performed by: INTERNAL MEDICINE

## 2022-02-05 PROCEDURE — 87040 BLOOD CULTURE FOR BACTERIA: CPT | Performed by: INTERNAL MEDICINE

## 2022-02-05 PROCEDURE — 258N000003 HC RX IP 258 OP 636: Performed by: INTERNAL MEDICINE

## 2022-02-05 PROCEDURE — 99233 SBSQ HOSP IP/OBS HIGH 50: CPT | Performed by: INTERNAL MEDICINE

## 2022-02-05 PROCEDURE — 96365 THER/PROPH/DIAG IV INF INIT: CPT | Mod: 59

## 2022-02-05 PROCEDURE — P9016 RBC LEUKOCYTES REDUCED: HCPCS | Performed by: INTERNAL MEDICINE

## 2022-02-05 PROCEDURE — 250N000011 HC RX IP 250 OP 636: Performed by: INTERNAL MEDICINE

## 2022-02-05 PROCEDURE — C9113 INJ PANTOPRAZOLE SODIUM, VIA: HCPCS | Performed by: INTERNAL MEDICINE

## 2022-02-05 PROCEDURE — 120N000001 HC R&B MED SURG/OB

## 2022-02-05 PROCEDURE — 80048 BASIC METABOLIC PNL TOTAL CA: CPT | Performed by: INTERNAL MEDICINE

## 2022-02-05 RX ORDER — ONDANSETRON 2 MG/ML
4 INJECTION INTRAMUSCULAR; INTRAVENOUS EVERY 6 HOURS PRN
Status: DISCONTINUED | OUTPATIENT
Start: 2022-02-05 | End: 2022-02-08 | Stop reason: HOSPADM

## 2022-02-05 RX ORDER — PROCHLORPERAZINE 25 MG
12.5 SUPPOSITORY, RECTAL RECTAL EVERY 12 HOURS PRN
Status: DISCONTINUED | OUTPATIENT
Start: 2022-02-05 | End: 2022-02-08 | Stop reason: HOSPADM

## 2022-02-05 RX ORDER — ONDANSETRON 4 MG/1
4 TABLET, ORALLY DISINTEGRATING ORAL EVERY 6 HOURS PRN
Status: DISCONTINUED | OUTPATIENT
Start: 2022-02-05 | End: 2022-02-08 | Stop reason: HOSPADM

## 2022-02-05 RX ORDER — LIDOCAINE 40 MG/G
CREAM TOPICAL
Status: DISCONTINUED | OUTPATIENT
Start: 2022-02-05 | End: 2022-02-08 | Stop reason: HOSPADM

## 2022-02-05 RX ORDER — SODIUM CHLORIDE 9 MG/ML
INJECTION, SOLUTION INTRAVENOUS CONTINUOUS
Status: DISCONTINUED | OUTPATIENT
Start: 2022-02-05 | End: 2022-02-08

## 2022-02-05 RX ORDER — PROCHLORPERAZINE MALEATE 5 MG
5 TABLET ORAL EVERY 6 HOURS PRN
Status: DISCONTINUED | OUTPATIENT
Start: 2022-02-05 | End: 2022-02-08 | Stop reason: HOSPADM

## 2022-02-05 RX ORDER — NITROGLYCERIN 0.4 MG/1
0.4 TABLET SUBLINGUAL EVERY 5 MIN PRN
Status: DISCONTINUED | OUTPATIENT
Start: 2022-02-05 | End: 2022-02-08 | Stop reason: HOSPADM

## 2022-02-05 RX ADMIN — PIPERACILLIN SODIUM AND TAZOBACTAM SODIUM 3.38 G: 3; .375 INJECTION, POWDER, LYOPHILIZED, FOR SOLUTION INTRAVENOUS at 10:52

## 2022-02-05 RX ADMIN — SODIUM CHLORIDE: 9 INJECTION, SOLUTION INTRAVENOUS at 20:56

## 2022-02-05 RX ADMIN — PANTOPRAZOLE SODIUM 40 MG: 40 INJECTION, POWDER, FOR SOLUTION INTRAVENOUS at 13:22

## 2022-02-05 RX ADMIN — INSULIN ASPART 3 UNITS: 100 INJECTION, SOLUTION INTRAVENOUS; SUBCUTANEOUS at 05:11

## 2022-02-05 RX ADMIN — PIPERACILLIN SODIUM AND TAZOBACTAM SODIUM 3.38 G: 3; .375 INJECTION, POWDER, LYOPHILIZED, FOR SOLUTION INTRAVENOUS at 17:13

## 2022-02-05 RX ADMIN — INSULIN ASPART 1 UNITS: 100 INJECTION, SOLUTION INTRAVENOUS; SUBCUTANEOUS at 10:13

## 2022-02-05 RX ADMIN — PIPERACILLIN SODIUM AND TAZOBACTAM SODIUM 3.38 G: 3; .375 INJECTION, POWDER, LYOPHILIZED, FOR SOLUTION INTRAVENOUS at 00:36

## 2022-02-05 RX ADMIN — PIPERACILLIN SODIUM AND TAZOBACTAM SODIUM 3.38 G: 3; .375 INJECTION, POWDER, LYOPHILIZED, FOR SOLUTION INTRAVENOUS at 23:21

## 2022-02-05 RX ADMIN — PIPERACILLIN SODIUM AND TAZOBACTAM SODIUM 3.38 G: 3; .375 INJECTION, POWDER, LYOPHILIZED, FOR SOLUTION INTRAVENOUS at 05:36

## 2022-02-05 RX ADMIN — SODIUM CHLORIDE: 9 INJECTION, SOLUTION INTRAVENOUS at 01:39

## 2022-02-05 ASSESSMENT — ACTIVITIES OF DAILY LIVING (ADL)
ADLS_ACUITY_SCORE: 5
ADLS_ACUITY_SCORE: 5
ADLS_ACUITY_SCORE: 3
ADLS_ACUITY_SCORE: 5
ADLS_ACUITY_SCORE: 5
ADLS_ACUITY_SCORE: 3
ADLS_ACUITY_SCORE: 5
ADLS_ACUITY_SCORE: 3
ADLS_ACUITY_SCORE: 12
ADLS_ACUITY_SCORE: 5
ADLS_ACUITY_SCORE: 3
ADLS_ACUITY_SCORE: 5
ADLS_ACUITY_SCORE: 3
ADLS_ACUITY_SCORE: 5
ADLS_ACUITY_SCORE: 3
ADLS_ACUITY_SCORE: 3
ADLS_ACUITY_SCORE: 5
ADLS_ACUITY_SCORE: 3
ADLS_ACUITY_SCORE: 3
ADLS_ACUITY_SCORE: 5
ADLS_ACUITY_SCORE: 3
ADLS_ACUITY_SCORE: 12

## 2022-02-05 NOTE — ED NOTES
Bed: ED23  Expected date:   Expected time:   Means of arrival:   Comments:  546 71m Syncope, hypotension recent GI bleed

## 2022-02-05 NOTE — ED PROVIDER NOTES
History   Chief Complaint:  Hypotension       HPI   Jakub Robles is a 71 year old male, recent admission yesterday for duodenitis with hemorrhage,  who presents after a syncopal episode. About an hour ago he was sitting at the dining table taking his Protonix which he was instructed to do so before he eats when he suddenly felt lightheaded and nauseous. The next thing he remembers is being on the floor. Family called fire for a lift assist. When EMS sat him up he became nauseous again  En route, the patient was hypotensive. While getting checked in the patient had another syncopal episode with emesis. RN reports that he went pale and unresponsive at this time. He shortly came to and states that he felt better.     Hemoglobin 7/21: 13.7    Hemoglobin 2/3: 9.3    Review of Systems   Gastrointestinal: Positive for nausea and vomiting.   Neurological: Positive for syncope and light-headedness.   10 systems reviewed and negative except as above and in HPI.    Allergies:  The patient has no known allergies.     Medications:  Norvasc  Aspirin 81 mg  Lipitor  Amaryl  Cozaar  Glucophage  Protonix  Viagra  Nitroglycerin    Past Medical History:     Type 2 diabetes  Heart disease  Hepatitis C  Hyperlipidemia  Hypertension  ED  Acute blood loss anemia  Duodenitis with hemorrhage  AAA    Past Surgical History:    Cardiac stenting x2  Cardiac catheterization  Colonoscopy  EGD combined  Dental implants  Sinus surgery    Family History:    Mother: Arthritis, pancreatic cancer  Father: AAA  Brother: Cancer  Son: Type 1 diabetes    Social History:  The patient presents to the ED alone via EMS.     Physical Exam     Patient Vitals for the past 24 hrs:   BP Temp Temp src Pulse Resp SpO2   02/04/22 2200 122/71 98.7  F (37.1  C) Oral 89 16 100 %   02/04/22 2159 -- -- -- 92 11 100 %   02/04/22 2032 -- -- -- 90 8 100 %   02/04/22 2030 100/60 -- -- 98 23 (!) 86 %   02/04/22 2015 (!) 113/102 -- -- 87 17 --   02/04/22 2000 -- -- -- 97  13 --   02/04/22 1950 104/88 -- -- 108 16 100 %   02/04/22 1945 105/78 -- -- 101 19 100 %       Physical Exam  General: Resting on the gurney, appears uncomfortable  Head:  The scalp, face, and head appear normal  Mouth/Throat: Mucus membranes are moist  CV:  Regular rate    Normal S1 and S2  No pathological murmur   Resp:  Breath sounds clear and equal bilaterally    Non-labored, no retractions or accessory muscle use    No coarseness    No wheezing   GI:  Abdomen is soft, no rigidity    No tenderness to palpation. No guarding or rebound.     Emesis on his gown which is brown and blood streaked.   MS:  Normal motor assessment of all extremities.    Good capillary refill noted.  Skin:   No rash or lesions noted. Somewhat pale.  Neuro:   Speech is normal and fluent. No apparent deficit.  Psych: Awake. Alert.  Normal affect.      Appropriate interactions    Emergency Department Course   ECG  ECG obtained at 1943, ECG read at 1945  Sinus tachycardia. Nonspecific T wave abnormality.    Rate 103 bpm. AK interval 150 ms. QRS duration 86 ms. QT/QTc 334/437 ms. P-R-T axes 68 47 62.     Imaging:  XR Chest 2 Views   Final Result   IMPRESSION: Negative chest.        Report per radiology    Laboratory:  Labs Ordered and Resulted from Time of ED Arrival to Time of ED Departure   COMPREHENSIVE METABOLIC PANEL - Abnormal       Result Value    Sodium 136      Potassium 3.8      Chloride 107      Carbon Dioxide (CO2) 26      Anion Gap 3      Urea Nitrogen 12      Creatinine 0.84      Calcium 7.7 (*)     Glucose 248 (*)     Alkaline Phosphatase 36 (*)     AST 12      ALT 17      Protein Total 5.4 (*)     Albumin 2.9 (*)     Bilirubin Total 0.2      GFR Estimate >90     CBC WITH PLATELETS AND DIFFERENTIAL - Abnormal    WBC Count 14.4 (*)     RBC Count 2.04 (*)     Hemoglobin 6.2 (*)     Hematocrit 19.1 (*)     MCV 94      MCH 30.4      MCHC 32.5      RDW 13.6      Platelet Count 190      % Neutrophils 76      % Lymphocytes 13      %  Monocytes 8      % Eosinophils 2      % Basophils 0      % Immature Granulocytes 1      NRBCs per 100 WBC 0      Absolute Neutrophils 10.9 (*)     Absolute Lymphocytes 1.9      Absolute Monocytes 1.2      Absolute Eosinophils 0.3      Absolute Basophils 0.1      Absolute Immature Granulocytes 0.1      Absolute NRBCs 0.0     INR - Abnormal    INR 1.29 (*)    GLUCOSE BY METER - Abnormal    GLUCOSE BY METER POCT 254 (*)    ISTAT GASES ELECTROLYTES VENOUS POCT - Abnormal    CPB Applied No      Hematocrit POCT 18 (*)     Bicarbonate Venous POCT 24      Hemoglobin POCT 6.1 (*)     Potassium POCT 3.9      Sodium POCT 138      pCO2 Venous POCT 44      pH Venous POCT 7.34      pO2 Venous POCT 21 (*)     O2 Sat, Venous POCT 31 (*)    COVID-19 VIRUS (CORONAVIRUS) BY PCR - Normal    SARS CoV2 PCR Negative     TROPONIN I - Normal    Troponin I High Sensitivity 8     COVID-19 VIRUS (CORONAVIRUS) BY PCR   TYPE AND SCREEN, ADULT    ABO/RH(D) A POS      Antibody Screen Negative      SPECIMEN EXPIRATION DATE [id number]     PREPARE RED BLOOD CELLS (UNIT)    CROSSMATCH Compatible      UNIT ABO/RH A Pos      Unit Number [id number]      Unit Status Issued      Blood Component Type Red Blood Cells      Product Code J3036L21      CODING SYSTEM VVYL033      UNIT TYPE ISBT 6200      ISSUE DATE AND TIME [id number]     PREPARE RED BLOOD CELLS (UNIT)   TRANSFUSE RED BLOOD CELLS (UNIT)   ABO/RH TYPE AND SCREEN      Emergency Department Course:    Reviewed:  I reviewed nursing notes, vitals, past medical history and Care Everywhere    Assessments:  1940 I obtained history and examined the patient as noted above.     2123 I rechecked the patient and explained findings.     Consults:  2043 I spoke with a member of the hospitalist team regarding the patient's presentation    2121 I spoke with Dr. Leslie, hospitalist, who accepts the patient.     Interventions:  1947 Zofran 4 mg IV  1947 NS 1L IV  2036 Protonix 80 mg IV  2040 Protonix  infusion 8 mg/hr IV    Disposition:  The patient was admitted to the hospital under the care of Dr. Leslie.     Impression & Plan     Medical Decision Making:  Jakub Robles is a 71 year old male presents for evaluation after a syncopal episode with vomiting at home. This occurred prior to arrival but patient had another episode while in the ED. En route to the emergency department, the patient's was noted to be hypotensive but appeared normal here. The two IVs were placed and a type and screen was obtained, as well as standard labs. The patient's hemoglobin was 6.2 compared to his 9.3 yesterday during admission for duodenitis hemorrhage. Therefore, transfusion was initiated. The patient did remain hemodynamically stable while in the emergency department. The patient was discussed with the hospitalist for admission and will be discussed with GI by the hospitalist.     Diagnosis:    ICD-10-CM    1. Gastrointestinal hemorrhage, unspecified gastrointestinal hemorrhage type  K92.2        Scribe Disclosure:  MORE, J Luis Burnette, am serving as a scribe at 9:40 PM on 2/4/2022 to document services personally performed by Naya Rucker MD based on my observations and the provider's statements to me.     Naya Rucker MD  02/04/22 9037

## 2022-02-05 NOTE — PROGRESS NOTES
RECEIVING UNIT ED HANDOFF REVIEW    ED Nurse Handoff Report was reviewed by: Martine Wheeler RN on February 5, 2022 at 12:17 AM

## 2022-02-05 NOTE — CONSULTS
GASTROENTEROLOGY CONSULTATION      Jakub Robles  71 year old male  Admission Date: 2/4/2022  Care Provider: Markel Kauffman was asked to see this patient in consultation by Dr. Leslie for evaluation of acute anemia and hematemesis.     HPI:  Jakub Robles is a 71 year old man with a history of diabetes and CAD s/p stents (on chronic aspirin 81 mg), who presents for syncope and acute anemia with report of hematemesis. Symptoms occurred abruptly yesterday within hours after discharge from [facility] (admitted 2/3/2022-2/4/2022 for bloody diarrhea likely due to diverticular bleed).     He had vomiting at home, but on inspection by family, looked more like tomatoes (which he had recently eaten) then blood. However, he had 1-2 more episodes of vomiting in ED that were described as having blood. He has not had any further nausea or vomiting today and has not had any bowel movements since yesterday. Hgb found to be 5.7 early this morning (down from 7.7 on discharge yesterday). Baseline hemoglobin before recent admission was normal. BUN/Cr normal this admission.     EGD 2/3/22: hiatal hernia, duodenitis that was not bleeding on initial exam but had some oozing with passage of scope (biopsied, likely related to chronic aspirin).  Colonoscopy 2/3/22: old blood in the sigmoid colon with extensive diverticulosis, brown stool in right colon (suspect diverticular bleed resolved).     CT A/P 2/3/22: no acute abnormalities    He does take NSAIDs other than aspirin.     Last screening colonoscopy August 2021 with 4 small tubular adenomas and one small SSA removed, and left sided diverticular disease.     Hemoglobin 9.3.  Was 13.7 in July 2022.      ROS: A comprehensive ten point review of systems was negative aside from those in mentioned in the HPI.          MEDICAL HISTORY:  Patient Active Problem List    Diagnosis Date Noted     Acute blood loss anemia 02/04/2022     Priority: Medium     Duodenitis with hemorrhage  02/04/2022     Priority: Medium     Hyperkalemia 02/04/2022     Priority: Medium     AAA -- 3.1 cm on Abd CT 2/3/22 02/04/2022     Priority: Medium     Duodenitis with bleeding 02/04/2022     Priority: Medium     Gastrointestinal hemorrhage, unspecified gastrointestinal hemorrhage type 02/04/2022     Priority: Medium     Essential hypertension 07/12/2021     Priority: Medium     Hyperlipidemia LDL goal <70 07/12/2021     Priority: Medium     Erectile dysfunction, unspecified erectile dysfunction type 12/08/2020     Priority: Medium     CAD -- S/P HUNG x 2 in 2016 08/22/2016     Priority: Medium     S/p stent placement x2       Microalbuminuria 10/17/2015     Priority: Medium     History of colonic polyps 10/09/2013     Priority: Medium     Problem list name updated by automated process. Provider to review       DM 2 w nephropathy, Hgb A1C 6.4 on 10/13/21 05/09/2009     Priority: Medium     A comprehensive ten point review of systems was performed and was negative aside from those in mentioned in the HPI.       :   Prior to Admission Medications   Prescriptions Last Dose Informant Patient Reported? Taking?   ACCU-CHEK RYAN PLUS test strip  Self No No   Sig: USE TO TEST BLOOD SUGAR TWICE DAILY   NITROGLYCERIN SL  Self Yes No   Sig: Place 0.4 mg under the tongue as needed for chest pain   acetaminophen (TYLENOL) 325 MG tablet   No No   Sig: Take 2 tablets (650 mg) by mouth every 6 hours as needed for mild pain or other (and adjunct with moderate or severe pain or per patient request)   amLODIPine (NORVASC) 5 MG tablet  Self No No   Sig: TAKE 1 TABLET EVERY DAY   aspirin (ASA) 81 MG chewable tablet   No No   Sig: Take 1 tablet (81 mg) by mouth daily   atorvastatin (LIPITOR) 40 MG tablet  Self No No   Sig: TAKE 1 TABLET EVERY DAY   blood glucose monitoring (ACCU-CHEK MULTICLIX) lancets  Self No No   Sig: by In Vitro route 2 times daily   ferrous sulfate (FEROSUL) 325 (65 Fe) MG tablet   No No   Sig: Take 1 tablet (325  mg) by mouth daily (with breakfast)   glimepiride (AMARYL) 2 MG tablet  Self No No   Sig: TAKE 1 TABLET EVERY MORNING BEFORE BREAKFAST (NEED 6 MONTH LAB TO CHECK A1C AND OFFICE VISIT BEFORE MORE REFILLS)   losartan (COZAAR) 100 MG tablet  Self No No   Sig: Take 1 tablet (100 mg) by mouth daily   metFORMIN (GLUCOPHAGE) 1000 MG tablet  Self No No   Sig: Take 1 tablet (1,000 mg) by mouth 2 times daily (with meals)   omega 3 1000 MG CAPS  Self No No   Sig: Take 1 g by mouth daily   pantoprazole (PROTONIX) 40 MG EC tablet   No No   Sig: Take 1 tablet (40 mg) by mouth 2 times daily (before meals)   sildenafil (VIAGRA) 100 MG tablet  Self No No   Sig: Take 1 tablet (100 mg) by mouth daily as needed (erectile dysfunction)   vitamin C (ASCORBIC ACID) 250 MG tablet   No No   Sig: Take 2 tablets (500 mg) by mouth daily      Facility-Administered Medications: None      : No Known Allergies   Social History:  Social History     Tobacco Use     Smoking status: Light Tobacco Smoker     Packs/day: 0.00     Years: 0.00     Pack years: 0.00     Types: Cigars     Start date: 1968     Last attempt to quit: 2001     Years since quittin.1     Smokeless tobacco: Never Used     Tobacco comment: I still vape   Substance Use Topics     Alcohol use: Yes     Comment: couple glasses wine/day     Drug use: No       Family History:  No first degree relative with colon cancer, gastric cancer, crohn's disease, ulcerative colitis, or liver disease.     EXAM:  General Appearance: Alert, oriented x3, no acute distress.  BP (!) 84/52 (BP Location: Left arm)   Pulse 86   Temp 98.4  F (36.9  C) (Oral)   Resp 18   Wt 88 kg (194 lb)   SpO2 95%   BMI 29.50 kg/m    Eye:  PERRL, sclera anicteric.  ENT: oropharynx clear, no thrush.  CV: RRR.  Resp: CTA bilaterally.  GI: Soft, NABS, NT/ND.  No HSM.  No stigmata of chronic liver disease.  Musculoskeletal: no joint swelling, erythema, or warmth.  Neuro: no asterixis.      Labs and imaging  reviewed    Recent Labs   Lab Test 02/05/22  0334 02/04/22 1948 02/04/22 1947 02/04/22  0547 02/03/22  1214 02/03/22  0736   WBC  --  14.4*  --  5.8  --  11.2*   HGB 5.7* 6.2* 6.1* 7.7*  7.7*   < > 9.3*   MCV  --  94  --  90  --  92   PLT  --  190  --  192  --  229   INR  --  1.29*  --   --   --  1.17*    < > = values in this interval not displayed.     Recent Labs   Lab Test 02/04/22 1948 02/04/22 1947 02/04/22  0547 02/03/22  1901 02/03/22  0736   POTASSIUM 3.8 3.9 3.9   < > 5.4*   CHLORIDE 107  --  108  --  103   CO2 26  --  25  --  27   BUN 12  --  15  --  31*   ANIONGAP 3  --  4  --  3    < > = values in this interval not displayed.     Recent Labs   Lab Test 02/04/22 1948 02/03/22  0736 07/12/21  0918 06/24/20  0746 09/06/19  1114 08/31/18  0836 08/18/17  0758 08/18/17  0757   ALBUMIN 2.9* 3.4 4.3  --  4.5  --    < >  --    BILITOTAL 0.2 0.3 0.6  --  0.5  --    < >  --    ALT 17 21 27   < > 24  --    < >  --    AST 12 11 19  --  15  --    < >  --    PROTEIN  --   --   --   --  Negative Negative  --  Negative   LIPASE  --  85  --   --   --   --   --   --     < > = values in this interval not displayed.     CT A/P (2/3/22):  LOWER CHEST: No infiltrates or effusions.     HEPATOBILIARY: Diffuse hepatic steatosis. No significant mass. No bile  duct dilatation. No calcified gallstones.     PANCREAS: No significant mass, duct dilatation, or inflammatory  change.     SPLEEN: Normal size.     ADRENAL GLANDS: No significant nodules.     KIDNEYS/BLADDER: No significant mass, stones, or hydronephrosis.     BOWEL: No obstruction or inflammatory change.     PELVIC ORGANS: No pelvic masses.     ADDITIONAL FINDINGS: 3.1 cm infrarenal abdominal aortic aneurysm. No  free air or free fluid.     MUSCULOSKELETAL: No frankly destructive bony lesions.                                                                      IMPRESSION:   1.  No acute process demonstrated.  2.  3.1 cm infrarenal abdominal aortic aneurysm.    CXR  (2/4/22):  INDICATION: Aspiration.  COMPARISON: None.                                                         IMPRESSION: Negative chest.    ASSESSMENT AND PLAN:    72 y/o man with CAD (on chronic aspirin 81 mg daily, held since recent admission on 2/2/22), recent admission for suspected sigmoid diverticular bleed (also with friable duodenitis, likely due to chronic aspirin use), who presents with acute anemia, syncope and reported hematemesis.   Current admission certainly more suspicious for UGIB, possible from duodenitis and recent biopsies. Significant pathology (malignancy, ulcers, varices, etc) ruled out by recent EGD. Still suspect recent admission hematochezia was from diverticular bleed. Current admission unlikely to be from lower GI bleed given no hematochezia.   Awaiting repeat hgb this morning (after 2 U PRBC transfusion). If appropriate increase in hgb and vitals remain stable, then would advance diet to clears and monitor for signs of recurrent bleeding. If evidence of recurrent upper GI bleed, then would proceed with EGD. If significant hematochezia concerning for recurrent diverticular bleed, then to consider CTA.    -Monitor for recurrent/overt bleeding, hemodynamic changes  -Continue PPI bid IV for now  -Holding aspirin  -If repeat hgb appropriate after transfusion, could advance diet to clears  -If recurrent signs of UGIB, consider EGD  -If hematochezia or concerns for lower GI bleed, consider CTA    A total of 40 minutes was spent in reviewing chart, assessing patient, formulating plan, and coordinating care.    Thank you for this interesting consult, please feel to call me if any questions arise.    Ish Ambriz MD  February 5, 2022

## 2022-02-05 NOTE — PROGRESS NOTES
[facility]    Medicine Progress Note - Hospitalist Service        Date of Admission:  2/4/2022  7:33 PM    Assessment & Plan:   Jakub Robles is 71, who was recently admitted from 02/03/2022 to 02/04/2022 with duodenitis, GIB and blood loss anemia and was discharged, now returns just within a few hours discharge with hematemesis and worsening acute blood loss anemia.     Acute gastrointestinal bleed  Acute blood loss anemia  Syncope most likely due to acute blood loss anemia  -EGD done on 2/3/2022 showed duodenitis with few localized erosions in the duodenal bulb, very friable and small amount of oozing with passage of the scope no blood was seen downstream in the duodenum.  -Subsequent colonoscopy on 2/4/2022 showed few small-mouthed diverticula in the sigmoid colon, large amount of old, liquid blood suctioned from the left colon.  No fresh blood or active bleeding seen.  It was felt that patient likely had a sigmoid diverticular bleed  -Hemoglobin at discharge was 8.3, presented with hematemesis at home with hemoglobin bonnie of 5.7 early this morning at 3 AM  -No clinical evidence of hematochezia, melena or bright red bleeding since admission  -He is s/p 3 units of PRBC overnight, awaiting hemoglobin checked this morning.  -[facility] following, discussed with Dr. Ambriz this morning.If repeat Hb is appropriate after transfusion, then plan is to start clears a and monitor clinically and with serial hemoglobin for any evidence of ongoing bleeding.  -If hemoglobin is not appropriate then she will have a repeat endoscopy today.  -If hematochezia or concerns for lower bleed then will be considered for CTA  -Continue Protonix drip for now  -Serial hemoglobin every 6 hours  -Blood pressure is soft but patient is more or less asymptomatic at the moment, continue to monitor closely  -Syncope most likely is due to blood loss anemia/vasovagal event.  Monitor on telemetry for 24 hours.    Suspected  aspiration pneumonitis and transient hypoxia  -The patient had a witnessed aspiration event and initially was on oxygen at 13 L via nonrebreather.    -Initial chest x-ray does not show any infiltrates  -Patient now completely weaned off oxygen and saturating normally on room air  -Likely initial hypoxia was related to the aspiration event which is now resolved  -We will recheck x-ray tomorrow morning, anticipate discontinuing antibiotics if no evidence of aspiration pneumonia in the next 24 hours.  -Continue IV Zosyn for today     Coronary artery disease s/p PCI in 2016  Hypertension  Hyperlipidemia  -Hold prior to admission aspirin  -Blood pressures are soft at the moment, hold antihypertensive agents at the moment.    Diabetes mellitus type II   -Patient is n.p.o. at the moment, continue sliding scale insulin for now.      Diet: NPO for Medical/Clinical Reasons Except for: Meds     DVT Prophylaxis: Pneumatic Compression Devices   Maguire Catheter: Not present  Code Status: Full Code     Disposition Plan    Expected Discharge: 02/07/2022     Anticipated discharge location:  Awaiting care coordination huddle  Delays:          Entered: Calixto Morgan MD 02/05/2022, 7:45 AM        Clinically Significant Risk Factors Present on Admission              # Coagulation Defect: INR = 1.29 (Ref range: 0.85 - 1.15) and/or PTT = 25 Seconds (Ref range: 22 - 38 Seconds) on admission, will monitor for bleeding  # Platelet Defect: home medication list includes an antiplatelet medication   # Diabetes, type II: last A1C 7.4 % (Ref range: 0.0 - 5.6 %)        The patient's care was discussed with the Bedside Nurse, Patient and Patient's Family.    Calixto Morgan MD  Hospitalist Service  [facility]  Text Page 7AM-6PM  Securely message with the Vocera Web Console (learn more here)  Text page via [employer] Paging/Directory    ______________________________________________________________________    Interval  "History   Patient feels weak and tired.  Denies lightheadedness or dizziness.  Hemoglobin dropped to 5.7 overnight.  He is s/p 3 units of PRBC.  Febrile up to 100.9  F.  Endorses minimal cough now.  No dyspnea.  Completely weaned off oxygen.    Data reviewed today: I reviewed all medications, new labs and imaging results over the last 24 hours. I personally reviewed no images or EKG's today.    Physical Exam   Vital signs:  Temp: 98.8  F (37.1  C) Temp src: Oral BP: (!) 87/59 Pulse: 82   Resp: 16 SpO2: 97 % O2 Device: None (Room air) Oxygen Delivery: 1 LPM   Weight: 88 kg (194 lb)  Estimated body mass index is 29.5 kg/m  as calculated from the following:    Height as of 12/13/21: 1.727 m (5' 8\").    Weight as of this encounter: 88 kg (194 lb).      Wt Readings from Last 2 Encounters:   02/05/22 88 kg (194 lb)   12/13/21 90.6 kg (199 lb 12.8 oz)       Gen: AAOX3, NAD, comfortable  HEENT: pallor +  Resp: CTA B/L, normal WOB, no crackles, no wheezes  CVS: RRR, no murmur  Abd/GI: Soft, non-tender. BS- normoactive.  No G/R/R  Skin: Warm, dry no rashes  MSK: No joint deformities, no pedal edema  Neuro- CN- intact. No focal deficits.  Spontaneously moving all 4 extremities      Data   Recent Labs   Lab 02/05/22  0658 02/05/22  0334 02/05/22  0309 02/04/22  1948 02/04/22  1947 02/04/22  0610 02/04/22  0547 02/03/22  1214 02/03/22  0736   WBC  --   --   --  14.4*  --   --  5.8  --  11.2*   HGB  --  5.7*  --  6.2* 6.1*  --  7.7*  7.7*   < > 9.3*   MCV  --   --   --  94  --   --  90  --  92   PLT  --   --   --  190  --   --  192  --  229   INR  --   --   --  1.29*  --   --   --   --  1.17*   NA  --   --   --  136 138  --  137  --  133   POTASSIUM  --   --   --  3.8 3.9  --  3.9   < > 5.4*   CHLORIDE  --   --   --  107  --   --  108  --  103   CO2  --   --   --  26  --   --  25  --  27   BUN  --   --   --  12  --   --  15  --  31*   CR  --   --   --  0.84  --   --  0.75  --  0.80   ANIONGAP  --   --   --  3  --   --  4  --  3 "   VALENTIN  --   --   --  7.7*  --   --  8.1*  --  8.2*   *  --  250* 248*  --    < > 190*   < > 282*   ALBUMIN  --   --   --  2.9*  --   --   --   --  3.4   PROTTOTAL  --   --   --  5.4*  --   --   --   --  6.4*   BILITOTAL  --   --   --  0.2  --   --   --   --  0.3   ALKPHOS  --   --   --  36*  --   --   --   --  42   ALT  --   --   --  17  --   --   --   --  21   AST  --   --   --  12  --   --   --   --  11   LIPASE  --   --   --   --   --   --   --   --  85    < > = values in this interval not displayed.       Recent Results (from the past 24 hour(s))   XR Chest 2 Views    Narrative    EXAM: XR CHEST 2 VW  LOCATION: [facility]  DATE/TIME: 2/4/2022 7:53 PM    INDICATION: Aspiration.  COMPARISON: None.      Impression    IMPRESSION: Negative chest.     Medications     pantoprazole (PROTONIX) infusion ADULT/PEDS GREATER than or EQUAL to 45 kg Stopped (02/05/22 0036)     sodium chloride 100 mL/hr at 02/05/22 0139       insulin aspart  1-6 Units Subcutaneous Q4H     piperacillin-tazobactam  3.375 g Intravenous Q6H     sodium chloride (PF)  3 mL Intracatheter Q8H

## 2022-02-05 NOTE — PROVIDER NOTIFICATION
"MD Notification    Notified Person: MD    Notified Person Name: Dr. Ambriz    Notification Date/Time: 2/5/2022 15:49    Notification Interaction: phone    Purpose of Notification: Pt had episode approx 300mL of dark red with BRB per rectum.    Orders Received: continue to monitor, recheck hgb in 1-2 hours post blood transfusion. If continues to have bloody stools, decreasing hgb, or symptomatic call Dr. Ambriz ([phone]) or on-call GI MD. Possible EGD tonight or tomorrow? Keep NPO.     Comments: Notified Dr. Morgan as well.     Paged Dr. mAbriz again at 1600- pt and wife, Esmer, requesting to talk to MD. Feeling very worried and as if we're \"running in circles and not getting any answers\". Dr Ambriz will come talk to pt personally to discuss plan.      "

## 2022-02-05 NOTE — PROVIDER NOTIFICATION
Paged Dr. Morgan 12:05 PM   Hgb 7.0, still soft BPs    MD asking to page Dr Ambriz from [facility]  Paged 12:11 PM   Hgb now 6.6  Dr. Ambriz instructs to monitor as pt is not actively bleeding (no stools or hemoptysis). Rec giving unit of blood and have pt on protonix IV 40mg BID.

## 2022-02-05 NOTE — PLAN OF CARE
Pt arrived from ED around 0100. Pt is A&Ox4. VSS on 1L via NC, sating in the mid 90's. Denies pain. C/o mild lightheadedness, improved throughout shift. Continent of B&B. No emesis. Denies nausea. NPO. Up SBA. L PIV infusing NS @ 100mL/hr, on hold while infusing blood, with int abx. Q4h BG checks, covered as needed. Received 1 unit of PRBC's in the ED, hgb recheck was 5.7, initiated blood transfusion, tolerated well, no reactions noted. Pre-infusion temp was 100.9, paged MD, okay to give blood. Blood cultures were also ordered, not back yet. Recheck hgb 1 hour after blood is completed per MD. GI consulted.

## 2022-02-05 NOTE — PROVIDER NOTIFICATION
MD Notification    Notified Person: MD    Notified Person Name: Dr. Manriquez    Notification Date/Time: 02/05/22    Notification Interaction: amcom    Purpose of Notification: hgb recheck 5.7, no orders to transfuse    Orders Received:  Transfuse 1 unit of blood, recheck hgb 1 hr after infusion in complete.    Comments:

## 2022-02-05 NOTE — H&P
Admitted: 02/04/2022    CONSULTING GASTROENTEROLOGIST:  Dr. Cookie Hickey.    CHIEF COMPLAINT:  Hematemesis, syncope.    HISTORY OF PRESENT ILLNESS:  Jakub Robles is a very pleasant 71-year-old gentleman who was recently discharged earlier  in the day with acute upper GI bleed blood loss anemia.  The patient was admitted from 02/03/2022 through 02/04/2022 with problem of duodenitis with hemorrhage.  He has known coronary disease including drug-eluting stent x 2 back in 2016.  He is admitted with hematochezia and dizziness.  He was given IV fluids.  His hemoglobin dropped down to 7.3.  He felt better after hydration.  His hemoglobin at discharge was 7.7.  He underwent an EGD, which showed a friable duodenum and likely the source of his GI bleed.  He had a colonoscopy.  Note he has got history of diverticular disease, which did not show any evidence of GI bleed.  He was asked to hold his aspirin and to limit his alcohol intake and was discharged earlier in the day.    The patient went back home, and about an hour prior to his admission was sitting at the dining table, taking his Protonix, when he suddenly felt lightheaded, nauseated, and had a hematemesis.  He apparently passed out.  The patient was brought in via EMS to [facility], where he had another episode of hematemesis and had another syncopal episode.  He apparently aspirated, and so he is currently on a nonrebreather at 13 liters.  He is mentating okay.  He was given a liter of normal saline.  He was given 80 mg IV Protonix push and started on Protonix drip.  He has been typed and crossed and is going to get 1 unit of packed red blood cell.  With the IV fluids, the patient's blood pressure did improve.  Initially it was 73/44, and now it is 102/60.  He is about to get his first unit.  Blood work revealed normal electrolytes, normal kidney function.  Albumin was 2.9.  White count 14.4, hemoglobin 6.2, platelets of 190.  He has got a left  shift.  INR is 1.29.  He is COVID negative.  He had a chest x-ray after his aspiration, which was negative.  He is scheduled to receive 2 units of packed red blood cells.  The patient is being admitted again for suspected upper gastrointestinal bleed.    PAST MEDICAL HISTORY:    1.  Type 2 diabetes.  2.  Heart disease with a history of drug-eluting stents.  3.  History of hepatitis C.  4.  Hyperlipidemia.  5.  Hypertension.  6.  Erectile dysfunction.  7.  History of acute blood loss anemia.  8.  History of duodenitis with hemorrhage.    9.  AAA.    PAST SURGICAL HISTORY:    1.  Cardiac stenting x 4.    2.  Colonoscopy.  3.  EGD.    4. Dental implants.    5.  Sinus surgery.    FAMILY HISTORY:  Mother with arthritis and pancreatic cancer.  Father with AAA.  Brother with cancer.  Son with type 1 diabetes.    SOCIAL HISTORY:  Positive for alcohol, no tobacco.    ALLERGIES:  NO KNOWN DRUG ALLERGIES.    CURRENT MEDICATION LIST:    1.  Tylenol.  2.  Amlodipine.  3.  Aspirin.  4.  Lipitor.  5.  Ferrous sulfate.  6.  Amaryl.  7.  Losartan.  8.  Metformin.  9.  Fish oil.  10.  Protonix.  11.  Sildenafil.  12.  Vitamin C.  13.  Sublingual nitroglycerin.    REVIEW OF SYSTEMS:  Ten points reviewed, and patient denies any chest pain or shortness of breath.  Positive for syncope, aspiration, hypoxemia.  Positive for hematemesis and lightheadedness and weakness.  Denies any focal neurologic complaints.  Denies any seizures.  Denies any diarrhea.  Denies any fevers, chills.  Otherwise, 10 points reviewed and otherwise negative.    PHYSICAL EXAMINATION:    VITAL SIGNS:  Temperature was 97.4.  Heart rate was 82, respiration 24.  Initial blood pressure was 73/44.  Currently it is 102/60.  Saturations are 100% on nonrebreather.  They were down to 70% post-aspiration.  GENERAL:  The patient appears to have pallor, but he is oriented x 3.  He is mildly diaphoretic.  HEENT:  Pupils equal.  Sclerae are anicteric.  Mucous membranes are dry.   The patient is on a face mask.  NECK:  JVP is flat.  PULMONARY:  Lungs are clear to auscultation.  CARDIOVASCULAR:  S1, S2, regular rate and rhythm, borderline tachycardic.  GASTROINTESTINAL:  Abdomen is distended.  There is no guarding or rebound.  Bowel sounds are hypoactive.  MUSCULOSKELETAL:  Shows no edema.  NEUROLOGIC:  He does move all 4 extremities.  Cranial nerves grossly intact.  He is oriented x 3.  SKIN:  Slightly diaphoretic.  Pallor is present.  PSYCHIATRIC:  Mood and affect normal.    LABORATORY AND IMAGING STUDIES:  As dictated in History of Present Illness.    ASSESSMENT:  Jakub Robles is 71, who was recently admitted from 02/03/2022 to 02/04/2022 with duodenitis and hemorrhage and blood loss anemia and was discharged, now returns just within a few hours with multiple episodes of hematemesis and acute blood loss anemia, being admitted for further monitoring, supportive treatment.    PLAN:    1.  Acute upper gastrointestinal bleed:  The patient has known duodenitis and blood loss anemia.  He appears to have had a couple of episodes of hematemesis with associated syncope, hypotension and acute blood loss anemia.  He had a colonoscopy that did not show any evidence of diverticular bleed.  The patient's source is likely coming from his duodenum.  The patient has been started on IV Protonix drip.  The patient's blood pressure has recovered.  If the patient were to have any further bleeding, then the patient should likely be sent for an angiogram, as we know where the source of his bleeding is likely coming from.  There was no visible vessel.  2.  Acute blood loss anemia:  The patient's hemoglobin is down to 6.3.  The patient should be typed and crossed and is getting the first of 2 units.  The patient's blood pressure responded with normal saline.  The patient will have serial hemoglobin checks every 6 hours.  3.  Acute aspiration with aspiration pneumonitis and hypoxemia:  The patient had a  witnessed aspiration event and is currently on 13 liters nonrebreather facemask.  A chest x-ray shows no infiltrates.  Suspect this is likely chemical pneumonitis, and hopefully the patient will be able to be weaned off his oxygen soon.  The patient will need to have his head of bed elevated.  We will make antiemetics available for him.  Given his aspiration, we will start the patient on Zosyn.  The patient's elevated white count could be due to combination of stress demargination as well as aspiration.  4.  Atherosclerotic cardiovascular disease:  It was thought, based on EKG report, that aspirin was the culprit.  Nonetheless, the patient has not really had any aspirin, and we will obviously hold further aspirin.  5.  Hypertension:  Given his acute presentation of hypotension, we will hold all of his blood pressure medications, specifically amlodipine and losartan.  6.  Diabetes:  The patient is going to be n.p.o.  We will hold his metformin and Amaryl and do Accu-Cheks every 4 hours and cover with sliding scale insulin.  7.  Hyperlipidemia:  We will hold the patient's Lipitor.    DEEP VENOUS THROMBOSIS PROPHYLAXIS:  The patient received compression boots.    CODE STATUS:  Full Code.    Yogi Leslie MD        D: 2022   T: 2022   MT: [id number]    Name:     JAMIL NARAYAN  MRN:      [id number]        Account:     [id number]   :      1950           Admitted:    2022       Document: [id number]    cc:  Cookie Hickey MD

## 2022-02-05 NOTE — PROVIDER NOTIFICATION
MD Notification    Notified Person: MD    Notified Person Name: Dr. Manriquez     Notification Date/Time: 2/5/22 0421    Notification Interaction: amcom    Purpose of Notification:    New temperature of 100.9. Other VSS. Do you still want us to give blood?     Orders Received:    Comments:

## 2022-02-05 NOTE — PROVIDER NOTIFICATION
Brief update:    Hemoglobin 5.7  1 unit packed red blood cells ordered, can recheck hemoglobin approximately 1 hour after completion of transfusion    No further bleeding noted  Apparently, per discussion with nursing, patient only received 1 unit packed red blood cells in the emergency department, not two.    Narendra Manriquez MD  4:06 AM

## 2022-02-05 NOTE — ED TRIAGE NOTES
Pt Arrived from home.  Seen in ED yesterday and admitted for GIB.  Colonoscopy done.  Pt HGB 7.0 (not transfused).  SBP 99 when discharged 2/4.  Once pt was home, he ate and then vomited (no blood) but became syncopal and found himself on the floor.  Fire Dept called for lift assist and they found pt pale and hypotensive, transported to ED.  Once in ED became nauseous and hypotensive, sat up to vomit and became syncopal and aspirated emesis. Dr Rucker in to help with pt.  Suction readied and pt awoke coughing.  IV started and IVF hung

## 2022-02-05 NOTE — H&P
71 year old with recent admission for duodenitis and blood loss anemia returns with 2 episodes of hematemesis , blood loss anemia with hgb of 6.3 and aspiration pneumonia with hypoxemia.  He is being admitted with 2 units of pRBC transfusion, iv protonix.  If patient were to bleed any further, he should have angiogram.      Dictation # [id number]      Yogi Leslie MD

## 2022-02-05 NOTE — ED NOTES
[facility]  ED Nurse Handoff Report    ED Chief complaint: Hypotension      ED Diagnosis:   Final diagnoses:   Gastrointestinal hemorrhage, unspecified gastrointestinal hemorrhage type       Code Status: Full Code    Allergies: No Known Allergies    Patient Story: Pt discharged from hospital today with hgb 7.0 and SBP 99 dx GIB.  Colonoscopy done prior to discharge.  After getting home vomited (no blood) then had syncopal event.  Fire called for lift assist and pt was hypotensive pale and dizzy, transported to ED.  Focused Assessment:  Pt pale and during check in, became hypotensive and nauseous.  When sat up to vomit pt became syncopal and aspirated, woke up coughing.      Treatments and/or interventions provided: oral suction readied. Supplemental O2 12 L per Mask, IV, IVF, protonix, blood  Results for orders placed or performed during the hospital encounter of 02/04/22   XR Chest 2 Views     Status: None    Narrative    EXAM: XR CHEST 2 VW  LOCATION: [facility]  DATE/TIME: 2/4/2022 7:53 PM    INDICATION: Aspiration.  COMPARISON: None.      Impression    IMPRESSION: Negative chest.   Comprehensive metabolic panel     Status: Abnormal   Result Value Ref Range    Sodium 136 133 - 144 mmol/L    Potassium 3.8 3.4 - 5.3 mmol/L    Chloride 107 94 - 109 mmol/L    Carbon Dioxide (CO2) 26 20 - 32 mmol/L    Anion Gap 3 3 - 14 mmol/L    Urea Nitrogen 12 7 - 30 mg/dL    Creatinine 0.84 0.66 - 1.25 mg/dL    Calcium 7.7 (L) 8.5 - 10.1 mg/dL    Glucose 248 (H) 70 - 99 mg/dL    Alkaline Phosphatase 36 (L) 40 - 150 U/L    AST 12 0 - 45 U/L    ALT 17 0 - 70 U/L    Protein Total 5.4 (L) 6.8 - 8.8 g/dL    Albumin 2.9 (L) 3.4 - 5.0 g/dL    Bilirubin Total 0.2 0.2 - 1.3 mg/dL    GFR Estimate >90 >60 mL/min/1.73m2   CBC with platelets and differential     Status: Abnormal   Result Value Ref Range    WBC Count 14.4 (H) 4.0 - 11.0 10e3/uL    RBC Count 2.04 (L) 4.40 - 5.90 10e6/uL    Hemoglobin  6.2 (LL) 13.3 - 17.7 g/dL    Hematocrit 19.1 (L) 40.0 - 53.0 %    MCV 94 78 - 100 fL    MCH 30.4 26.5 - 33.0 pg    MCHC 32.5 31.5 - 36.5 g/dL    RDW 13.6 10.0 - 15.0 %    Platelet Count 190 150 - 450 10e3/uL    % Neutrophils 76 %    % Lymphocytes 13 %    % Monocytes 8 %    % Eosinophils 2 %    % Basophils 0 %    % Immature Granulocytes 1 %    NRBCs per 100 WBC 0 <1 /100    Absolute Neutrophils 10.9 (H) 1.6 - 8.3 10e3/uL    Absolute Lymphocytes 1.9 0.8 - 5.3 10e3/uL    Absolute Monocytes 1.2 0.0 - 1.3 10e3/uL    Absolute Eosinophils 0.3 0.0 - 0.7 10e3/uL    Absolute Basophils 0.1 0.0 - 0.2 10e3/uL    Absolute Immature Granulocytes 0.1 <=0.4 10e3/uL    Absolute NRBCs 0.0 10e3/uL   Extra Blue Top Tube     Status: None   Result Value Ref Range    Hold Specimen [facility]    Extra Red Top Tube     Status: None   Result Value Ref Range    Hold Specimen [facility]    Extra Blood Bank Purple Top Tube     Status: None   Result Value Ref Range    Hold Specimen [facility]    Asymptomatic COVID-19 Virus (Coronavirus) by PCR Nasopharyngeal     Status: Normal    Specimen: Nasopharyngeal; Swab   Result Value Ref Range    SARS CoV2 PCR Negative Negative    Narrative    Testing was performed using the odette  SARS-CoV-2 & Influenza A/B Assay on the odette  Jenae  System.  This test should be ordered for the detection of SARS-COV-2 in individuals who meet SARS-CoV-2 clinical and/or epidemiological criteria. Test performance is unknown in asymptomatic patients.  This test is for in vitro diagnostic use under the FDA EUA for laboratories certified under CLIA to perform moderate and/or high complexity testing. This test has not been FDA cleared or approved.  A negative test does not rule out the presence of PCR inhibitors in the specimen or target RNA in concentration below the limit of detection for the assay. The possibility of a false negative should be considered if the patient's recent exposure or clinical presentation suggests COVID-19.  [facility]  [facility] are certified under the Clinical Laboratory Improvement Amendments of 1988 (CLIA-88) as qualified to perform moderate and/or high complexity laboratory testing.   INR     Status: Abnormal   Result Value Ref Range    INR 1.29 (H) 0.85 - 1.15   Troponin I     Status: Normal   Result Value Ref Range    Troponin I High Sensitivity 8 <79 ng/L   Glucose by meter     Status: Abnormal   Result Value Ref Range    GLUCOSE BY METER POCT 254 (H) 70 - 99 mg/dL   iStat Gases Electrolytes Venous, POCT     Status: Abnormal   Result Value Ref Range    CPB Applied No     Hematocrit POCT 18 (L) 40 - 53 %    Bicarbonate Venous POCT 24 21 - 28 mmol/L    Hemoglobin POCT 6.1 (LL) 13.3 - 17.7 g/dL    Potassium POCT 3.9 3.4 - 5.3 mmol/L    Sodium POCT 138 133 - 144 mmol/L    pCO2 Venous POCT 44 40 - 50 mm Hg    pH Venous POCT 7.34 7.32 - 7.43    pO2 Venous POCT 21 (L) 25 - 47 mm Hg    O2 Sat, Venous POCT 31 (L) 94 - 100 %   EKG 12-lead, tracing only     Status: None   Result Value Ref Range    Systolic Blood Pressure  mmHg    Diastolic Blood Pressure  mmHg    Ventricular Rate 103 BPM    Atrial Rate 103 BPM    TX Interval 150 ms    QRS Duration 86 ms     ms    QTc 437 ms    P Axis 68 degrees    R AXIS 47 degrees    T Axis 62 degrees    Interpretation ECG       Sinus tachycardia  Nonspecific T wave abnormality  Abnormal ECG    Confirmed by GENERATED REPORT, COMPUTER ([id number]),  Jurgen Mayfield ([id number]) on 2/4/2022 7:50:44 PM     Adult Type and Screen     Status: None   Result Value Ref Range    ABO/RH(D) A POS     Antibody Screen Negative Negative    SPECIMEN EXPIRATION DATE [id number]    Prepare red blood cells (unit)     Status: None (Preliminary result)   Result Value Ref Range    CROSSMATCH Compatible     UNIT ABO/RH A Pos     Unit Number [id number]     Unit Status Issued     Blood Component Type Red Blood Cells     Product Code [id number]     CODING SYSTEM [id number]     UNIT TYPE ISBT 6200     ISSUE DATE AND  TIME [id number]    CBC with platelets + differential     Status: Abnormal    Narrative    The following orders were created for panel order CBC with platelets + differential.  Procedure                               Abnormality         Status                     ---------                               -----------         ------                     CBC with platelets and d...[[id number]]  Abnormal            Final result                 Please view results for these tests on the individual orders.   [facility] Draw     Status: None    Narrative    The following orders were created for panel order [facility] Draw.  Procedure                               Abnormality         Status                     ---------                               -----------         ------                     Extra Blue Top Tube[[id number]]                              Final result               Extra Red Top Tube[[id number]]                               Final result               Extra Blood Bank Purple ...[[id number]]                      Final result                 Please view results for these tests on the individual orders.   ABO/Rh type and screen     Status: None    Narrative    The following orders were created for panel order ABO/Rh type and screen.  Procedure                               Abnormality         Status                     ---------                               -----------         ------                     Adult Type and Screen[[id number]]                            Final result                 Please view results for these tests on the individual orders.   Results for orders placed or performed during the hospital encounter of 02/03/22   UPPER GI ENDOSCOPY     Status: None   Result Value Ref Range    Upper GI Endoscopy       [place] [address] [place]  [address]  _______________________________________________________________________________  Patient Name: Jakub Coellorosalbaleslyfiordaliza        Procedure Date:  2/3/2022 1:19 PM  MRN: [id number]                       Account Number: [id number]  YOB: 1950              Admit Type: Inpatient  Age: 71                               Room: 2  Note Status: Finalized                Attending MD: Cookie Staley MD  Instrument Name: 404 GIF- Gastroscope   _______________________________________________________________________________     Procedure:                Upper GI endoscopy  Indications:              Melena  Providers:                Cookie Staley MD, Danya Hayes RN  Referring MD:               Medicines:                Midazolam 2 mg IV, Fentanyl 100 micrograms IV,                             Benzocaine spray  Complications:            No immediate complications.  __________________________________________________ _____________________________  Procedure:                Pre-Anesthesia Assessment:                            - Prior to the procedure, a History and Physical                             was performed, and patient medications and                             allergies were reviewed. The patient is competent.                             The risks and benefits of the procedure and the                             sedation options and risks were discussed with the                             patient. All questions were answered and informed                             consent was obtained. Patient identification and                             proposed procedure were verified by the physician                             and the nurse in the procedure room. Mental Status                             Examination: alert and oriented. Airway                             Examination: normal oropharyngeal airway and neck                             mobility. Respiratory Examination: clear to                              auscultation. CV Examination: normal. Prophylactic                             Antibiotics: The patient does not require                              prophylactic antibiotics. Prior Anticoagulants: The                             patient has taken no anticoagulant or antiplatelet                             agents except for aspirin. ASA Grade Assessment:                             III - A patient with severe systemic disease. After                             reviewing the risks and benefits, the patient was                             deemed in satisfactory condition to undergo the                             procedure. The anesthesia plan was to use moderate                             sedation / analgesia (conscious sedation).                             Immediately prior to administration of medications,                             the patient was re-assessed for adequacy to receive                             sedatives. The heart rate, respiratory rate, oxy gen                             saturations, blood pressure, adequacy of pulmonary                             ventilation, and response to care were monitored                             throughout the procedure. The physical status of                             the patient was re-assessed after the procedure.                            After obtaining informed consent, the endoscope was                             passed under direct vision. Throughout the                             procedure, the patient's blood pressure, pulse, and                             oxygen saturations were monitored continuously. The                             Endoscope was introduced through the mouth, and                             advanced to the third part of duodenum. The upper                             GI endoscopy was accomplished without difficulty.                             The patient tolerated the procedure well.                                                                                    Findings:       The Z-line was regular and was found 38 cm from the incisors.       A  small 1cm hiatal hernia was present. The gastric mucosa appeared        normal.       A few localized erosions were in the duodenal bulb. Very friable and        small amount of oozing with passage of the scope. Mild erythema        surrounding. No ulcer seen. The more distal duodenum was normal. No        blood seen in duodenum downstream.                                                                                   Impression:               - Z-line regular, 38 cm from the incisors.                            - Hiatal hernia.                            - Duodenitis which has likely bled, but did not                             appear severe enough to cause bloody stools and a                             drop in hemoglobin. It is likely from the aspirin.                            - No specimens collected.  Recommendation:           Continue IV ppi tody                            Chewable  ASA taken with food recommended                            Colonoscopy                                                                                   Procedure Code(s):       --- Professional ---       69701, Esophagogastroduodenoscopy, flexible, transoral; diagnostic,        including collection of specimen(s) by brushing or washing, when        performed (separate procedure)    CPT copyright 2020 [employer]. All rights reserved.    The codes documented in this report are preliminary and upon  review may   be revised to meet current compliance requirements.    Electonically signed by Cookie Staley MD  _______________________  Cookie Staley MD  2/3/2022 1:51:05 PM  I was physically present for the entire viewing portion of the exam.  Cookie Staley MD  Number of Addenda: 0    Note Initiated On: 2/3/2022 1:19 PM  Total Procedure Duration: 0 hours 4 minutes 8 seconds   Estimated Blood Loss:       minimal  Scope In: 1:37:16 PM  Scope Out: 1:41:24  PM     COLONOSCOPY     Status: None    Result Value Ref Range    COLONOSCOPY       [facility]  [address] [place]  [address]  _______________________________________________________________________________  Patient Name: Jakub Robles        Procedure Date: 2/4/2022 12:19 PM  MRN: [id number]                       Account Number: [id number]  YOB: 1950              Admit Type: Inpatient  Age: 71                               Room: 4                          Note Status: Finalized                Attending MD: Cookie Staley MD  Instrument Name: 425 [id number] Colonoscope   _______________________________________________________________________________     Procedure:                Colonoscopy  Indications:              Hematochezia  Providers:                Cookie Staley MD, Hemalatha Akins RN  Referring MD:               Medicines:                Midazolam 3 mg IV, Fentanyl 100 micrograms IV  Complications:            No immediate complications.  _______________________________________________________________________ ________  Procedure:                Pre-Anesthesia Assessment:                            - Prior to the procedure, a History and Physical                             was performed, and patient medications and                             allergies were reviewed. The patient is competent.                             The risks and benefits of the procedure and the                             sedation options and risks were discussed with the                             patient. All questions were answered and informed                             consent was obtained. Patient identification and                             proposed procedure were verified by the physician                             and the nurse in the procedure room. Mental Status                             Examination: alert and oriented. Airway                             Examination: normal oropharyngeal airway and neck                              mobility. Respiratory Examination: clear to                              auscultation. CV Examination: normal. Prophylactic                             Antibiotics: The patient does not require                             prophylactic antibiotics. Prior Anticoagulants: The                             patient has taken no anticoagulant or antiplatelet                             agents. ASA Grade Assessment: III - A patient with                             severe systemic disease. After reviewing the risks                             and benefits, the patient was deemed in                             satisfactory condition to undergo the procedure.                             The anesthesia plan was to use moderate sedation /                             analgesia (conscious sedation). Immediately prior                             to administration of medications, the patient was                             re-assessed for adequacy to receive sedatives. The                             heart rate, respiratory rate, oxygen saturations,                              blood pressure, adequacy of pulmonary ventilation,                             and response to care were monitored throughout the                             procedure. The physical status of the patient was                             re-assessed after the procedure.                            After obtaining informed consent, the colonoscope                             was passed under direct vision. Throughout the                             procedure, the patient's blood pressure, pulse, and                             oxygen saturations were monitored continuously. The                             Colonoscope was introduced through the anus and                             advanced to the terminal ileum. The colonoscopy was                             performed without difficulty. The patient tolerated                              the procedure well. The quality of the bowel                             preparation was good.                                                                                    Findings:       The perianal and digital rectal examinations were normal. Internal        hemorrhoids seen on retroflexion.       A few small-mouthed diverticula were found in the sigmoid colon. Large        amount of old, liquid blood suctioned from the left colon. No fresh        blood, no active bleeding seen.       The terminal ileum appeared normal. There was clear fluid in the        terminal ileum with no signs of blood.       The exam was otherwise without abnormality on direct and retroflexion        views.                                                                                   Impression:               - I think it is likely that the patient had a                             sigmoid diverticular bleed. There was old blood and                             tics in this area. No fresh blood. The fluid in the                             right colon and terminal ileum was light brown,                             very unlikely there was bleeding  from above the                             colon.  Recommendation:           If rebleeding I would recommend a CT Angiogram and                             if positive a consult to Interventional Radiology.                                                                                   Procedure Code(s):       --- Professional ---       89615, Colonoscopy, flexible; diagnostic, including collection of        specimen(s) by brushing or washing, when performed (separate procedure)    CPT copyright 2020 American Medical Association. All rights reserved.    The codes documented in this report are preliminary and upon  review may   be revised to meet current compliance requirements.    Electonically signed by Cookie Staley MD  _______________________  Cookie Staley  MD  2/4/2022 1:22:34 PM  I was physically present for the entire viewing portion of the exam.  Cookie Staley MD  Number of Addenda: 0    Note Initiated On: 2/4/2022 12:19 PM  Scope Withdrawal Time: 0 hours  12 minutes 45 seconds   Total Procedure Duration: 0 hours 23 minutes 17 seconds   Estimated Blood Loss:       0 ml  Scope In: 12:48:11 PM  Scope Out: 1:11:28 PM     CT Abdomen Pelvis w Contrast     Status: None    Narrative    CT ABDOMEN AND PELVIS WITH CONTRAST 2/3/2022 8:56 AM    CLINICAL HISTORY: Abdominal pain, acute, nonlocalized.    TECHNIQUE: CT scan of the abdomen and pelvis was performed following  injection of IV contrast. Multiplanar reformats were obtained. Dose  reduction techniques were used.  CONTRAST: 100 mL Isovue-370    COMPARISON: None.    FINDINGS:   LOWER CHEST: No infiltrates or effusions.    HEPATOBILIARY: Diffuse hepatic steatosis. No significant mass. No bile  duct dilatation. No calcified gallstones.    PANCREAS: No significant mass, duct dilatation, or inflammatory  change.    SPLEEN: Normal size.    ADRENAL GLANDS: No significant nodules.    KIDNEYS/BLADDER: No significant mass, stones, or hydronephrosis.    BOWEL: No obstruction or inflammatory change.    PELVIC ORGANS: No pelvic masses.    ADDITIONAL FINDINGS: 3.1 cm infrarenal abdominal aortic aneurysm. No  free air or free fluid.    MUSCULOSKELETAL: No frankly destructive bony lesions.      Impression    IMPRESSION:   1.  No acute process demonstrated.  2.  3.1 cm infrarenal abdominal aortic aneurysm.    BIANCA MARIE MD         SYSTEM ID:  [id number]   INR     Status: Abnormal   Result Value Ref Range    INR 1.17 (H) 0.85 - 1.15   Partial thromboplastin time     Status: Normal   Result Value Ref Range    aPTT 25 22 - 38 Seconds   Comprehensive metabolic panel     Status: Abnormal   Result Value Ref Range    Sodium 133 133 - 144 mmol/L    Potassium 5.4 (H) 3.4 - 5.3 mmol/L    Chloride 103 94 - 109 mmol/L    Carbon Dioxide (CO2)  27 20 - 32 mmol/L    Anion Gap 3 3 - 14 mmol/L    Urea Nitrogen 31 (H) 7 - 30 mg/dL    Creatinine 0.80 0.66 - 1.25 mg/dL    Calcium 8.2 (L) 8.5 - 10.1 mg/dL    Glucose 282 (H) 70 - 99 mg/dL    Alkaline Phosphatase 42 40 - 150 U/L    AST 11 0 - 45 U/L    ALT 21 0 - 70 U/L    Protein Total 6.4 (L) 6.8 - 8.8 g/dL    Albumin 3.4 3.4 - 5.0 g/dL    Bilirubin Total 0.3 0.2 - 1.3 mg/dL    GFR Estimate >90 >60 mL/min/1.73m2   Lipase     Status: Normal   Result Value Ref Range    Lipase 85 73 - 393 U/L   Lactic acid whole blood     Status: Normal   Result Value Ref Range    Lactic Acid 1.3 0.7 - 2.0 mmol/L   Troponin I     Status: Normal   Result Value Ref Range    Troponin I High Sensitivity 8 <79 ng/L   Asymptomatic COVID-19 Virus (Coronavirus) by PCR Nasopharyngeal     Status: Normal    Specimen: Nasopharyngeal; Swab   Result Value Ref Range    SARS CoV2 PCR Negative Negative    Narrative    Testing was performed using the odette  SARS-CoV-2 & Influenza A/B Assay on the odette  Jenae  System.  This test should be ordered for the detection of SARS-COV-2 in individuals who meet SARS-CoV-2 clinical and/or epidemiological criteria. Test performance is unknown in asymptomatic patients.  This test is for in vitro diagnostic use under the FDA EUA for laboratories certified under CLIA to perform moderate and/or high complexity testing. This test has not been FDA cleared or approved.  A negative test does not rule out the presence of PCR inhibitors in the specimen or target RNA in concentration below the limit of detection for the assay. The possibility of a false negative should be considered if the patient's recent exposure or clinical presentation suggests COVID-19.  [facility] are certified under the Clinical Laboratory Improvement Amendments of 1988 (CLIA-88) as qualified to perform moderate and/or high complexity laboratory testing.   Glucose by meter     Status: Abnormal   Result Value Ref Range    GLUCOSE  BY METER POCT 271 (H) 70 - 99 mg/dL   CBC with platelets and differential     Status: Abnormal   Result Value Ref Range    WBC Count 11.2 (H) 4.0 - 11.0 10e3/uL    RBC Count 3.11 (L) 4.40 - 5.90 10e6/uL    Hemoglobin 9.3 (L) 13.3 - 17.7 g/dL    Hematocrit 28.6 (L) 40.0 - 53.0 %    MCV 92 78 - 100 fL    MCH 29.9 26.5 - 33.0 pg    MCHC 32.5 31.5 - 36.5 g/dL    RDW 13.4 10.0 - 15.0 %    Platelet Count 229 150 - 450 10e3/uL    % Neutrophils 83 %    % Lymphocytes 8 %    % Monocytes 7 %    % Eosinophils 2 %    % Basophils 0 %    % Immature Granulocytes 0 %    NRBCs per 100 WBC 0 <1 /100    Absolute Neutrophils 9.3 (H) 1.6 - 8.3 10e3/uL    Absolute Lymphocytes 0.9 0.8 - 5.3 10e3/uL    Absolute Monocytes 0.8 0.0 - 1.3 10e3/uL    Absolute Eosinophils 0.2 0.0 - 0.7 10e3/uL    Absolute Basophils 0.1 0.0 - 0.2 10e3/uL    Absolute Immature Granulocytes 0.0 <=0.4 10e3/uL    Absolute NRBCs 0.0 10e3/uL   Hemoglobin     Status: Abnormal   Result Value Ref Range    Hemoglobin 8.3 (L) 13.3 - 17.7 g/dL   Hemoglobin     Status: Abnormal   Result Value Ref Range    Hemoglobin 7.3 (L) 13.3 - 17.7 g/dL   Potassium     Status: Normal   Result Value Ref Range    Potassium 3.7 3.4 - 5.3 mmol/L   Glucose by meter     Status: Abnormal   Result Value Ref Range    GLUCOSE BY METER POCT 136 (H) 70 - 99 mg/dL   Hemoglobin     Status: Abnormal   Result Value Ref Range    Hemoglobin 7.2 (L) 13.3 - 17.7 g/dL   Glucose by meter     Status: Abnormal   Result Value Ref Range    GLUCOSE BY METER POCT 258 (H) 70 - 99 mg/dL   Basic metabolic panel     Status: Abnormal   Result Value Ref Range    Sodium 137 133 - 144 mmol/L    Potassium 3.9 3.4 - 5.3 mmol/L    Chloride 108 94 - 109 mmol/L    Carbon Dioxide (CO2) 25 20 - 32 mmol/L    Anion Gap 4 3 - 14 mmol/L    Urea Nitrogen 15 7 - 30 mg/dL    Creatinine 0.75 0.66 - 1.25 mg/dL    Calcium 8.1 (L) 8.5 - 10.1 mg/dL    Glucose 190 (H) 70 - 99 mg/dL    GFR Estimate >90 >60 mL/min/1.73m2   CBC with platelets      Status: Abnormal   Result Value Ref Range    WBC Count 5.8 4.0 - 11.0 10e3/uL    RBC Count 2.64 (L) 4.40 - 5.90 10e6/uL    Hemoglobin 7.7 (L) 13.3 - 17.7 g/dL    Hematocrit 23.7 (L) 40.0 - 53.0 %    MCV 90 78 - 100 fL    MCH 29.2 26.5 - 33.0 pg    MCHC 32.5 31.5 - 36.5 g/dL    RDW 13.5 10.0 - 15.0 %    Platelet Count 192 150 - 450 10e3/uL   Hemoglobin     Status: Abnormal   Result Value Ref Range    Hemoglobin 7.7 (L) 13.3 - 17.7 g/dL   Glucose by meter     Status: Abnormal   Result Value Ref Range    GLUCOSE BY METER POCT 138 (H) 70 - 99 mg/dL   Glucose by meter     Status: Abnormal   Result Value Ref Range    GLUCOSE BY METER POCT 161 (H) 70 - 99 mg/dL   Glucose by meter     Status: Abnormal   Result Value Ref Range    GLUCOSE BY METER POCT 152 (H) 70 - 99 mg/dL   Gastroenterology IP Consult: GIB with near syncope, hgb 9.3 (13 on 7/2021). soft BP; Consultant may enter orders: Yes; Patient to be seen: Routine - within 24 hours; Requested Clinic/Group: [facility]; Requesting provider? Hospitalist (if different from...     Status: None ()    Cookie Stone MD     2/3/2022 12:06 PM  [facility] GASTRO[facility]OLOGY CONSULTATION     Jakub Robles  [address] [facility] [address]  71 year old male    Admission Date/Time: 2/3/2022  Primary Care Provider: Markel Kauffman    We were asked to see the patient in consultation by Dr. Dudley   for evaluation of bloody stools.        HPI:  Jakub Robles is a 71 year old male with a history of   diabetes and CAD  who presented to [facility] 2/3/2022 with   bloody diarrhea.  He states he woke up very early this morning   with two episodes of bloody diarrhea. No abdominal pain but he   did have nausea. Stools appeared dark per his report. Felt light   headed and took an ambulance to the hospital. Denies fever or   chills. No bowel movement since about 5am.  Does not use any   NSAIDs for pain but he does take an NSAID daily and also drinks    alcohol.    Last colonoscopy 2021 with several small adenomas and left   sided diverticular disease.    Hemoglobin 9.3.  Was 13.7 in 2022.     ROS: A comprehensive ten point review of systems was negative   aside from those in mentioned in the HPI.      MEDICATIONS: No current facility-administered medications on file   prior to encounter.  amLODIPine (NORVASC) 5 MG tablet, TAKE 1 TABLET EVERY DAY  aspirin 81 MG tablet, Take 1 tablet by mouth daily  atorvastatin (LIPITOR) 40 MG tablet, TAKE 1 TABLET EVERY DAY  glimepiride (AMARYL) 2 MG tablet, TAKE 1 TABLET EVERY MORNING   BEFORE BREAKFAST (NEED 6 MONTH LAB TO CHECK A1C AND OFFICE VISIT   BEFORE MORE REFILLS)  losartan (COZAAR) 100 MG tablet, Take 1 tablet (100 mg) by mouth   daily  metFORMIN (GLUCOPHAGE) 1000 MG tablet, Take 1 tablet (1,000 mg)   by mouth 2 times daily (with meals)  omega 3 1000 MG CAPS, Take 1 g by mouth daily  ACCU-CHEK RYAN PLUS test strip, USE TO TEST BLOOD SUGAR TWICE   DAILY  blood glucose monitoring (ACCU-CHEK MULTICLIX) lancets, by In   Vitro route 2 times daily  NITROGLYCERIN SL, Place 0.4 mg under the tongue as needed for   chest pain  sildenafil (VIAGRA) 100 MG tablet, Take 1 tablet (100 mg) by   mouth daily as needed (erectile dysfunction)        ALLERGIES: No Known Allergies    Past Medical History:   Diagnosis Date     Diabetes mellitus (H)      Heart disease      Hepatitis C      Hyperlipidemia      Hypertension        Past Surgical History:   Procedure Laterality Date     CARDIAC SURGERY  2016    stents x 2     COLONOSCOPY       HEAD & NECK SURGERY      dental implants     implant[      dental     SINUS SURGERY  1998         SOCIAL HISTORY:  Social History     Tobacco Use     Smoking status: Light Tobacco Smoker     Packs/day: 0.00     Years: 0.00     Pack years: 0.00     Types: Cigars     Start date: 1968     Last attempt to quit: 2001     Years since quittin.1     Smokeless tobacco: Never Used      Tobacco comment: I still vape   Substance Use Topics     Alcohol use: Yes     Comment: couple glasses wine/day     Drug use: No       FAMILY HISTORY:  Aunt with some type of colon issue.    PHYSICAL EXAM:   BP 99/62 (BP Location: Left arm)   Pulse 75   Temp 97.9  F   (36.6  C) (Oral)   Resp 11   SpO2 100%     Constitutional: NAD, comfortable  Cardiovascular: RRR, normal S1 and S2  Respiratory: CTAB  Psychiatric: mentation appears normal and affect normal/bright  Head: Normocephalic. Atraumatic.    Neck:  normal size, trachea midline  Eyes:   no icterus  ENT: hearing adequate, pharynx normal without erythema or exudate  Abdomen:   Auscultation: noral  Appearance: normal  Palpation: nontender  NEURO: grossly negative  SKIN: no suspicious lesions or rashes          ADDITIONAL COMMENTS:   I reviewed the patient's new clinical lab test results.   Recent Labs   Lab Test 02/03/22  0736 07/12/21  0918 09/06/19  1114   WBC 11.2* 6.3 5.8   HGB 9.3* 13.7 13.0*   MCV 92 91 88    197 196   INR 1.17*  --   --      Recent Labs   Lab Test 02/03/22  0736 02/03/22  0709 07/12/21  0918 06/24/20  0746     --  135 134   POTASSIUM 5.4*  --  4.7 4.2   CHLORIDE 103  --  102 103   CO2 27  --  30 27   BUN 31*  --  17 17   CR 0.80  --  0.86 0.81   ANIONGAP 3  --  3 4   VALENTIN 8.2*  --  9.0 9.0   * 271* 191* 184*     Recent Labs   Lab Test 02/03/22  0736 07/12/21  0918 03/19/21  1056 06/24/20  0746 09/06/19  1114 08/31/18  0836 08/18/17  0758 08/18/17  0757   ALBUMIN 3.4 4.3  --   --  4.5  --    < >  --    BILITOTAL 0.3 0.6  --   --  0.5  --    < >  --    ALT 21 27 21   < > 24  --    < >  --    AST 11 19  --   --  15  --    < >  --    ALKPHOS 42 51  --   --  46  --    < >  --    PROTEIN  --   --   --   --  Negative Negative  --  Negative   LIPASE 85  --   --   --   --   --   --   --     < > = values in this interval not displayed.       2/3/22 CT Abdomen:  1.  No acute process demonstrated.  2.  3.1 cm infrarenal  abdominal aortic aneurysm.    CONSULTATION ASSESSMENT AND PLAN:    Active Problems:    GI bleed    Assessment: Several episodes of hematochezia overnight.  Drop   in hemoglobin.  Painless, consider diverticular bleed. However,   BUN slightly elevated and report of nausea, upper GI bleeding   should be ruled out as well.  Ischemia possible as well. Recent   colonoscopy about 6 months ago with diverticulosis and small   polyps.  Has never had an upper endoscopy.      Plan:   - EGD today  - if negative, colonoscopy tomorrow      Cookie Staley MD  [facility]  Office:  [phone]  Cell phone  [phone]           Adult Type and Screen     Status: None   Result Value Ref Range    ABO/RH(D) A POS     Antibody Screen Negative Negative    SPECIMEN EXPIRATION DATE [id number]    CBC with platelets differential     Status: Abnormal    Narrative    The following orders were created for panel order CBC with platelets differential.  Procedure                               Abnormality         Status                     ---------                               -----------         ------                     CBC with platelets and d...[[id number]]  Abnormal            Final result                 Please view results for these tests on the individual orders.   ABO/Rh type and screen     Status: None    Narrative    The following orders were created for panel order ABO/Rh type and screen.  Procedure                               Abnormality         Status                     ---------                               -----------         ------                     Adult Type and Screen[[id number]]                            Final result                 Please view results for these tests on the individual orders.       Patient's response to treatments and/or interventions: Pt improving.    To be done/followed up on inpatient unit:      Does this patient have any cognitive concerns?: none    Activity level -  Baseline/Home:  Independent  Activity Level - Current:   Stand with assist x2    Patient's Preferred language: English   Needed?: No    Isolation: None  Infection: Not Applicable  Patient tested for COVID 19 prior to admission: YES  Bariatric?: No    Vital Signs:   Vitals:    02/04/22 2032 02/04/22 2159 02/04/22 2200 02/04/22 2215   BP:   122/71 102/60   Pulse: 90 92 89 89   Resp: 8 11 16 11   Temp:   98.7  F (37.1  C) 99  F (37.2  C)   TempSrc:   Oral Oral   SpO2: 100% 100% 100% 100%       Cardiac Rhythm:     Was the PSS-3 completed:  Yes  What interventions are required if any?  none             Family Comments: Wife updated per Dr Leslie Plan for IP admission over the weekend  OBS brochure/video discussed/provided to patient/family: N/A              Name of person given brochure if not patient:               Relationship to patient:     For the majority of the shift this patient's behavior was Green.   Behavioral interventions performed were none.    ED NURSE PHONE NUMBER: *[phone]

## 2022-02-06 ENCOUNTER — APPOINTMENT (OUTPATIENT)
Dept: GENERAL RADIOLOGY | Facility: CLINIC | Age: 72
DRG: 377 | End: 2022-02-06
Attending: INTERNAL MEDICINE
Payer: MEDICARE

## 2022-02-06 LAB
GLUCOSE BLDC GLUCOMTR-MCNC: 120 MG/DL (ref 70–99)
GLUCOSE BLDC GLUCOMTR-MCNC: 127 MG/DL (ref 70–99)
GLUCOSE BLDC GLUCOMTR-MCNC: 132 MG/DL (ref 70–99)
GLUCOSE BLDC GLUCOMTR-MCNC: 154 MG/DL (ref 70–99)
GLUCOSE BLDC GLUCOMTR-MCNC: 155 MG/DL (ref 70–99)
GLUCOSE BLDC GLUCOMTR-MCNC: 155 MG/DL (ref 70–99)
HGB BLD-MCNC: 7.8 G/DL (ref 13.3–17.7)
HGB BLD-MCNC: 8.1 G/DL (ref 13.3–17.7)
HGB BLD-MCNC: 8.7 G/DL (ref 13.3–17.7)
HGB BLD-MCNC: 8.7 G/DL (ref 13.3–17.7)
UPPER GI ENDOSCOPY: NORMAL

## 2022-02-06 PROCEDURE — 0DJ08ZZ INSPECTION OF UPPER INTESTINAL TRACT, VIA NATURAL OR ARTIFICIAL OPENING ENDOSCOPIC: ICD-10-PCS | Performed by: INTERNAL MEDICINE

## 2022-02-06 PROCEDURE — 43235 EGD DIAGNOSTIC BRUSH WASH: CPT | Performed by: INTERNAL MEDICINE

## 2022-02-06 PROCEDURE — 99233 SBSQ HOSP IP/OBS HIGH 50: CPT | Performed by: INTERNAL MEDICINE

## 2022-02-06 PROCEDURE — C9113 INJ PANTOPRAZOLE SODIUM, VIA: HCPCS | Performed by: INTERNAL MEDICINE

## 2022-02-06 PROCEDURE — 71045 X-RAY EXAM CHEST 1 VIEW: CPT

## 2022-02-06 PROCEDURE — 250N000011 HC RX IP 250 OP 636: Performed by: INTERNAL MEDICINE

## 2022-02-06 PROCEDURE — 120N000001 HC R&B MED SURG/OB

## 2022-02-06 PROCEDURE — 36415 COLL VENOUS BLD VENIPUNCTURE: CPT | Performed by: INTERNAL MEDICINE

## 2022-02-06 PROCEDURE — 85018 HEMOGLOBIN: CPT | Performed by: INTERNAL MEDICINE

## 2022-02-06 PROCEDURE — G0500 MOD SEDAT ENDO SERVICE >5YRS: HCPCS | Performed by: INTERNAL MEDICINE

## 2022-02-06 PROCEDURE — 258N000003 HC RX IP 258 OP 636: Performed by: INTERNAL MEDICINE

## 2022-02-06 RX ORDER — FENTANYL CITRATE 50 UG/ML
INJECTION, SOLUTION INTRAMUSCULAR; INTRAVENOUS PRN
Status: COMPLETED | OUTPATIENT
Start: 2022-02-06 | End: 2022-02-06

## 2022-02-06 RX ORDER — FENTANYL CITRATE 50 UG/ML
50 INJECTION, SOLUTION INTRAMUSCULAR; INTRAVENOUS
Status: ACTIVE | OUTPATIENT
Start: 2022-02-06 | End: 2022-02-07

## 2022-02-06 RX ORDER — FLUMAZENIL 0.1 MG/ML
0.2 INJECTION, SOLUTION INTRAVENOUS
Status: ACTIVE | OUTPATIENT
Start: 2022-02-06 | End: 2022-02-08

## 2022-02-06 RX ORDER — FLUMAZENIL 0.1 MG/ML
0.2 INJECTION, SOLUTION INTRAVENOUS
Status: ACTIVE | OUTPATIENT
Start: 2022-02-06 | End: 2022-02-07

## 2022-02-06 RX ORDER — NALOXONE HYDROCHLORIDE 0.4 MG/ML
0.2 INJECTION, SOLUTION INTRAMUSCULAR; INTRAVENOUS; SUBCUTANEOUS
Status: ACTIVE | OUTPATIENT
Start: 2022-02-06 | End: 2022-02-08

## 2022-02-06 RX ORDER — NALOXONE HYDROCHLORIDE 0.4 MG/ML
0.4 INJECTION, SOLUTION INTRAMUSCULAR; INTRAVENOUS; SUBCUTANEOUS
Status: ACTIVE | OUTPATIENT
Start: 2022-02-06 | End: 2022-02-08

## 2022-02-06 RX ORDER — FENTANYL CITRATE 50 UG/ML
25 INJECTION, SOLUTION INTRAMUSCULAR; INTRAVENOUS EVERY 5 MIN PRN
Status: ACTIVE | OUTPATIENT
Start: 2022-02-06 | End: 2022-02-07

## 2022-02-06 RX ORDER — LIDOCAINE 40 MG/G
CREAM TOPICAL
Status: DISCONTINUED | OUTPATIENT
Start: 2022-02-06 | End: 2022-02-06 | Stop reason: HOSPADM

## 2022-02-06 RX ADMIN — PIPERACILLIN SODIUM AND TAZOBACTAM SODIUM 3.38 G: 3; .375 INJECTION, POWDER, LYOPHILIZED, FOR SOLUTION INTRAVENOUS at 05:07

## 2022-02-06 RX ADMIN — FENTANYL CITRATE 100 MCG: 50 INJECTION, SOLUTION INTRAMUSCULAR; INTRAVENOUS at 11:24

## 2022-02-06 RX ADMIN — PANTOPRAZOLE SODIUM 40 MG: 40 INJECTION, POWDER, FOR SOLUTION INTRAVENOUS at 00:59

## 2022-02-06 RX ADMIN — SODIUM CHLORIDE: 9 INJECTION, SOLUTION INTRAVENOUS at 14:39

## 2022-02-06 RX ADMIN — MIDAZOLAM 1 MG: 1 INJECTION INTRAMUSCULAR; INTRAVENOUS at 11:28

## 2022-02-06 RX ADMIN — PANTOPRAZOLE SODIUM 40 MG: 40 INJECTION, POWDER, FOR SOLUTION INTRAVENOUS at 12:23

## 2022-02-06 RX ADMIN — MIDAZOLAM 20 MG: 1 INJECTION INTRAMUSCULAR; INTRAVENOUS at 11:22

## 2022-02-06 ASSESSMENT — ACTIVITIES OF DAILY LIVING (ADL)
ADLS_ACUITY_SCORE: 5
ADLS_ACUITY_SCORE: 7
ADLS_ACUITY_SCORE: 5

## 2022-02-06 ASSESSMENT — MIFFLIN-ST. JEOR
SCORE: 1609.94
SCORE: 1613.57

## 2022-02-06 NOTE — PROGRESS NOTES
[facility]    Medicine Progress Note - Hospitalist Service        Date of Admission:  2/4/2022  7:33 PM    Assessment & Plan:   Jakub Robles is 71, who was recently admitted from 02/03/2022 to 02/04/2022 with duodenitis, GIB and blood loss anemia and was discharged, now returns just within a few hours discharge with hematemesis and worsening acute blood loss anemia.     Acute gastrointestinal bleed  Acute blood loss anemia  Syncope most likely due to acute blood loss anemia  -EGD done on 2/3/2022 showed duodenitis with few localized erosions in the duodenal bulb, very friable and small amount of oozing with passage of the scope no blood was seen downstream in the duodenum.  -Subsequent colonoscopy on 2/4/2022 showed few small-mouthed diverticula in the sigmoid colon, large amount of old, liquid blood suctioned from the left colon.  No fresh blood or active bleeding seen.  It was felt that patient likely had a sigmoid diverticular bleed  -Hemoglobin at discharge was 8.3, presented with hematemesis at home with hemoglobin bonnie of 5.7 early this morning at 3 AM  -He is s/p 4 units of PRBC last 24 hours  -Hemoglobin 8.1 this morning, patient with 4 episodes of bloody bowel movement overnight.  -[facility] following, discussed with Dr. Ambriz this morning.plan is for repeat upper GI endoscopy this morning.  -Continue Protonix 40 mg IV twice daily  -Serial hemoglobin every 6 hours  -Monitor stool output and clinical evidence of bleeding  -Blood pressure much stable now.  -Normal sinus rhythm on telemetry, syncope was most likely due to acute blood loss anemia.    Suspected aspiration pneumonitis and transient hypoxia  -The patient had a witnessed aspiration event and initially was on oxygen at 13 L via nonrebreather.    -Initial chest x-ray does not show any infiltrates  -Patient now completely weaned off oxygen and saturating normally on room air  -Likely initial hypoxia was related to the  "aspiration event which is now resolved  -Repeat chest x-ray today does not show any infiltrates. No new pulmonary symptoms. Afebrile. Discontinue IV antibiotics.     Coronary artery disease s/p PCI in 2016  Hypertension  Hyperlipidemia  -Hold prior to admission aspirin  -Blood pressures are slightly better today, continue to hold prior to admission antihypertensive agents at the moment.    Diabetes mellitus type II   -Hold prior to admission glimepiride and Metformin  -Patient is n.p.o. at the moment, continue sliding scale insulin for now.      Diet: NPO per Anesthesia Guidelines for Procedure/Surgery Except for: Meds     DVT Prophylaxis: Pneumatic Compression Devices   Maguire Catheter: Not present  Code Status: Full Code     Disposition Plan    Expected Discharge: 02/07/2022     Anticipated discharge location:  Awaiting care coordination huddle  Delays:          Entered: Calixto Morgan MD 02/06/2022, 8:04 AM        Clinically Significant Risk Factors Present on Admission              # Diabetes, type II: last A1C 7.4 % (Ref range: 0.0 - 5.6 %)  # Overweight: Estimated body mass index is 29.47 kg/m  as calculated from the following:    Height as of this encounter: 1.727 m (5' 8\").    Weight as of this encounter: 87.9 kg (193 lb 12.8 oz).        The patient's care was discussed with the Bedside Nurse, Patient and Patient's Family.    Calixto Morgan MD  Hospitalist Service  [facility]  Text Page 7AM-6PM  Securely message with the Vocera Web Console (learn more here)  Text page via [employer] Paging/Directory    ______________________________________________________________________    Interval History   Patient is afebrile. Required altogether 4 units of PRBC yesterday. Hemoglobin is better at 8.1 this morning. Altogether 4 episodes of bloody bowel movement. Denies lightheadedness or dizziness. Blood pressures are much better today. Plan for upper GI endoscopy this morning.    Data reviewed " "today: I reviewed all medications, new labs and imaging results over the last 24 hours. I personally reviewed no images or EKG's today.    Physical Exam   Vital signs:  Temp: 97.7  F (36.5  C) Temp src: Oral BP: 135/68 Pulse: 82   Resp: 16 SpO2: 96 % O2 Device: None (Room air) Oxygen Delivery: 1 LPM Height: 172.7 cm (5' 8\") Weight: 87.9 kg (193 lb 12.8 oz)  Estimated body mass index is 29.47 kg/m  as calculated from the following:    Height as of this encounter: 1.727 m (5' 8\").    Weight as of this encounter: 87.9 kg (193 lb 12.8 oz).      Wt Readings from Last 2 Encounters:   02/06/22 87.9 kg (193 lb 12.8 oz)   12/13/21 90.6 kg (199 lb 12.8 oz)       Gen: AAOX3, NAD, comfortable  HEENT: pallor +  Resp: CTA B/L, normal WOB  CVS: RRR, no murmur  Abd/GI: Soft, non-tender. BS- normoactive.   Skin: Warm, dry no rashes  MSK:  no pedal edema  Neuro- CN- intact. No focal deficits.  Spontaneously moving all 4 extremities      Data   Recent Labs   Lab 02/06/22  0606 02/06/22  0551 02/06/22  0209 02/06/22  0102 02/05/22  2159 02/05/22  1805 02/05/22  1145 02/05/22  1040 02/05/22  0309 02/04/22  1948 02/04/22  1947 02/04/22  0610 02/04/22  0547 02/03/22  1214 02/03/22  0736   WBC  --   --   --   --   --   --   --  13.7*  --  14.4*  --   --  5.8  --  11.2*   HGB  --  8.1*  --  7.8*  --  7.5*   < > 7.0*  7.0*  7.0*   < > 6.2* 6.1*  --  7.7*  7.7*   < > 9.3*   MCV  --   --   --   --   --   --   --  91  --  94  --   --  90  --  92   PLT  --   --   --   --   --   --   --  144*  --  190  --   --  192  --  229   INR  --   --   --   --   --   --   --   --   --  1.29*  --   --   --   --  1.17*   NA  --   --   --   --   --   --   --  137  --  136 138  --  137  --  133   POTASSIUM  --   --   --   --   --   --   --  3.8  --  3.8 3.9  --  3.9   < > 5.4*   CHLORIDE  --   --   --   --   --   --   --  109  --  107  --   --  108  --  103   CO2  --   --   --   --   --   --   --  24  --  26  --   --  25  --  27   BUN  --   --   --   --   --  "  --   --  14  --  12  --   --  15  --  31*   CR  --   --   --   --   --   --   --  0.89  --  0.84  --   --  0.75  --  0.80   ANIONGAP  --   --   --   --   --   --   --  4  --  3  --   --  4  --  3   VALENTIN  --   --   --   --   --   --   --  7.4*  --  7.7*  --   --  8.1*  --  8.2*   *  --  154*  --  155*  --    < > 191*   < > 248*  --    < > 190*   < > 282*   ALBUMIN  --   --   --   --   --   --   --   --   --  2.9*  --   --   --   --  3.4   PROTTOTAL  --   --   --   --   --   --   --   --   --  5.4*  --   --   --   --  6.4*   BILITOTAL  --   --   --   --   --   --   --   --   --  0.2  --   --   --   --  0.3   ALKPHOS  --   --   --   --   --   --   --   --   --  36*  --   --   --   --  42   ALT  --   --   --   --   --   --   --   --   --  17  --   --   --   --  21   AST  --   --   --   --   --   --   --   --   --  12  --   --   --   --  11   LIPASE  --   --   --   --   --   --   --   --   --   --   --   --   --   --  85    < > = values in this interval not displayed.       No results found for this or any previous visit (from the past 24 hour(s)).  Medications     sodium chloride 50 mL/hr at 02/06/22 0751       insulin aspart  1-6 Units Subcutaneous Q4H     pantoprazole (PROTONIX) IV  40 mg Intravenous Q12H     piperacillin-tazobactam  3.375 g Intravenous Q6H     sodium chloride (PF)  3 mL Intracatheter Q8H

## 2022-02-06 NOTE — PRE-PROCEDURE
INPATIENT PRE-PROCEDURE NOTE    CHIEF COMPLAINT / REASON FOR PROCEDURE: hematochezia, acute anemia    Admission History and Physical Reviewed, including past medical history, social history family history, ROS, and interval progress notes: on chart-<30 days old; updated within 7 days.    PRE-SEDATION ASSESSMENT:  Lung Exam: normal  Heart Exam: normal  Airway Exam: Normal  Previous reaction to anesthesia/sedation: NO  Sedation plan based on assessment:Moderate (conscious) sedation  ASA Classification: 2 - Mild systemic disease       IMPRESSION: 72 y/o man with acute anemia and hematochezia. Recent EGD with duodenitis biopsied a few days ago. Recent colonoscopy a few days ago with suspected sigmoid diverticular bleed.    PLAN:   -EGD    Ish Ambriz MD  February 6, 2022

## 2022-02-06 NOTE — PLAN OF CARE
A&Ox4, VSS on RA. Bps were improved over night. Tele NSR, Hgb 7.8, and 8.1 Pt had 4 bloody stools overnight. Paged GI on call provider and hospitalist, orders for 1 unit PRBCs (4th since admission). No other signs or symptoms of bleeding noted. Remains NPO. BS soft and non tender. Up SBA, to bathroom. PIV infusing NS at 100ml/hr with intermittent abx. Plan to have a repeat chest xray in AM. GI continues to follow, discharge pending medical improvement.

## 2022-02-06 NOTE — PROVIDER NOTIFICATION
MD Notification    Notified Person: MD    Notified Person Name: CHETAN Saul Mariama    Notification Date/Time: 02/05/22 2100    Notification Interaction: Phone    Purpose of Notification: Pt had another bloody stool tonight    Orders Received: 1 unit RBCs     Comments:

## 2022-02-06 NOTE — PLAN OF CARE
A&Ox4. BP soft, MDs aware. Other VSS on RA. Tele: NSR. Hgb 7.0 and 6.6, 1 unit PRBC given (3rd unit since admission), recheck pending. Pt had ~300mL maroon/BRB liquid stool this afternoon- GI and hospitalist notified. No other s/sx bleeding noted. NPO, denies nausea. BS active, soft and non-tender, passing flatus. Up with SBA, denies dizziness but feeling weak, calls appropriately for assistance. PIV infusing NS at 100mL/hr with intermittent zosyn for possible aspiration. BC pending. Plan for repeat chest xray tomorrow. Will continue to monitor.

## 2022-02-07 LAB
ERYTHROCYTE [DISTWIDTH] IN BLOOD BY AUTOMATED COUNT: 14.6 % (ref 10–15)
GLUCOSE BLDC GLUCOMTR-MCNC: 119 MG/DL (ref 70–99)
GLUCOSE BLDC GLUCOMTR-MCNC: 128 MG/DL (ref 70–99)
GLUCOSE BLDC GLUCOMTR-MCNC: 134 MG/DL (ref 70–99)
GLUCOSE BLDC GLUCOMTR-MCNC: 158 MG/DL (ref 70–99)
GLUCOSE BLDC GLUCOMTR-MCNC: 223 MG/DL (ref 70–99)
GLUCOSE BLDC GLUCOMTR-MCNC: 239 MG/DL (ref 70–99)
HCT VFR BLD AUTO: 27.2 % (ref 40–53)
HGB BLD-MCNC: 8.2 G/DL (ref 13.3–17.7)
HGB BLD-MCNC: 8.7 G/DL (ref 13.3–17.7)
HGB BLD-MCNC: 8.7 G/DL (ref 13.3–17.7)
HGB BLD-MCNC: 9.2 G/DL (ref 13.3–17.7)
HGB BLD-MCNC: 9.4 G/DL (ref 13.3–17.7)
MCH RBC QN AUTO: 28.9 PG (ref 26.5–33)
MCHC RBC AUTO-ENTMCNC: 32 G/DL (ref 31.5–36.5)
MCV RBC AUTO: 90 FL (ref 78–100)
PLATELET # BLD AUTO: 162 10E3/UL (ref 150–450)
RBC # BLD AUTO: 3.01 10E6/UL (ref 4.4–5.9)
WBC # BLD AUTO: 9.7 10E3/UL (ref 4–11)

## 2022-02-07 PROCEDURE — 85027 COMPLETE CBC AUTOMATED: CPT | Performed by: INTERNAL MEDICINE

## 2022-02-07 PROCEDURE — 36415 COLL VENOUS BLD VENIPUNCTURE: CPT | Performed by: INTERNAL MEDICINE

## 2022-02-07 PROCEDURE — 258N000003 HC RX IP 258 OP 636: Performed by: INTERNAL MEDICINE

## 2022-02-07 PROCEDURE — 36415 COLL VENOUS BLD VENIPUNCTURE: CPT | Performed by: NURSE PRACTITIONER

## 2022-02-07 PROCEDURE — 99232 SBSQ HOSP IP/OBS MODERATE 35: CPT | Performed by: INTERNAL MEDICINE

## 2022-02-07 PROCEDURE — 85018 HEMOGLOBIN: CPT | Performed by: NURSE PRACTITIONER

## 2022-02-07 PROCEDURE — C9113 INJ PANTOPRAZOLE SODIUM, VIA: HCPCS | Performed by: INTERNAL MEDICINE

## 2022-02-07 PROCEDURE — 120N000001 HC R&B MED SURG/OB

## 2022-02-07 PROCEDURE — 250N000011 HC RX IP 250 OP 636: Performed by: INTERNAL MEDICINE

## 2022-02-07 RX ADMIN — INSULIN ASPART 2 UNITS: 100 INJECTION, SOLUTION INTRAVENOUS; SUBCUTANEOUS at 21:50

## 2022-02-07 RX ADMIN — INSULIN ASPART 1 UNITS: 100 INJECTION, SOLUTION INTRAVENOUS; SUBCUTANEOUS at 18:13

## 2022-02-07 RX ADMIN — PANTOPRAZOLE SODIUM 40 MG: 40 INJECTION, POWDER, FOR SOLUTION INTRAVENOUS at 13:10

## 2022-02-07 RX ADMIN — PANTOPRAZOLE SODIUM 40 MG: 40 INJECTION, POWDER, FOR SOLUTION INTRAVENOUS at 00:44

## 2022-02-07 RX ADMIN — INSULIN ASPART 2 UNITS: 100 INJECTION, SOLUTION INTRAVENOUS; SUBCUTANEOUS at 14:58

## 2022-02-07 RX ADMIN — SODIUM CHLORIDE: 9 INJECTION, SOLUTION INTRAVENOUS at 13:05

## 2022-02-07 ASSESSMENT — ACTIVITIES OF DAILY LIVING (ADL)
ADLS_ACUITY_SCORE: 5

## 2022-02-07 NOTE — PROGRESS NOTES
"[facility] Progress Note     Interval History:  GI bleeding stopped last night. Tolerating clear liquids. No dizziness/lightheadedness. Wife at bedside.  Discussed hospital course.     Physical Exam:    BP (!) 152/76 (BP Location: Left arm)   Pulse 82   Temp 98.8  F (37.1  C) (Oral)   Resp 16   Ht 1.727 m (5' 8\")   Wt 87.5 kg (193 lb)   SpO2 97%   BMI 29.35 kg/m    Temp (24hrs), Av  F (36.7  C), Min:97.2  F (36.2  C), Max:98.8  F (37.1  C)    Patient Vitals for the past 72 hrs:   Weight   22 1059 87.5 kg (193 lb)   22 0612 87.9 kg (193 lb 12.8 oz)   22 0557 88 kg (194 lb)       Intake/Output Summary (Last 24 hours) at 2022 1039  Last data filed at 2022 1822  Gross per 24 hour   Intake 353 ml   Output 2230 ml   Net -1877 ml       Constitutional: No acute distress  Cardiovascular: RRR, normal S1/S2  Abdomen: Soft, nondistended, nontender    Laboratory Data  Recent Labs   Lab Test 22  0715 22  2349 22  1750 22  1145 22  1040 22  0334 22  1948 22  1214 22  0736   WBC 9.7  --   --   --  13.7*  --  14.4*   < > 11.2*   HGB 8.7*  8.7* 8.2* 8.7*   < > 7.0*  7.0*  7.0*   < > 6.2*   < > 9.3*   MCV 90  --   --   --  91  --  94   < > 92     --   --   --  144*  --  190   < > 229   INR  --   --   --   --   --   --  1.29*  --  1.17*    < > = values in this interval not displayed.     Recent Labs   Lab Test 22  1040 22  19422  0547    136 138 137   POTASSIUM 3.8 3.8 3.9 3.9   CHLORIDE 109 107  --  108   CO2 24 26  --  25   BUN 14 12  --  15   CR 0.89 0.84  --  0.75   ANIONGAP 4 3  --  4   VALENTIN 7.4* 7.7*  --  8.1*     Recent Labs   Lab Test 22  0736 21  0918 20  0746 19  1114 18  0836 17  0758 17  0757   ALBUMIN 2.9* 3.4 4.3  --  4.5  --    < >  --    BILITOTAL 0.2 0.3 0.6  --  0.5  --    < >  --    ALT 17 21 27   < > 24  --    < >  --    AST 12 " 11 19  --  15  --    < >  --    ALKPHOS 36* 42 51  --  46  --    < >  --    PROTEIN  --   --   --   --  Negative Negative  --  Negative   LIPASE  --  85  --   --   --   --   --   --     < > = values in this interval not displayed.       Imaging  CT ABDOMEN AND PELVIS WITH CONTRAST 2/3/2022 8:56 AM                                                        IMPRESSION:   1.  No acute process demonstrated.  2.  3.1 cm infrarenal abdominal aortic aneurysm.    Endoscopic Workup  EGD 2/3/22  Impression:               - Z-line regular, 38 cm from the incisors.                             - Hiatal hernia.                             - Duodenitis which has likely bled, but did not                             appear severe enough to cause bloody stools and a                             drop in hemoglobin. It is likely from the aspirin.                             - No specimens collected.     Colonoscopy 2/4/22  Findings:   - no active bleeding the colon  - left sided diverticula with old blood  - light brown fluid in the right colon and terminal ileum    EGD 2/6/22  Impression:               - Normal esophagus.                             - Normal stomach.                             - Duodenitis. No signs of recent active bleeding or                             stigmata of bleeding. This could have been a source                             of bleeding at time of admission, but no signs that                             it has been bleeding in recent hours. If there is                             continued bleeding, then would suspect another                             source.   Assessment & Plan:  Overt, obscure GI bleed   72 y/o man with CAD (on chronic aspirin 81 mg daily, held since recent admission on 2/2/22), recent admission for suspected sigmoid diverticular bleed (colonoscopy showed ticks with old blood in left colon, brown stool in right colon) also with friable duodenitis, likely due to chronic aspirin use), who  presented with acute anemia, syncope, recurrent GI bleeding 2/4 the day of initial discharge. It was thought that he may have had hematemesis but the pt thinks this was actually tomato's from an omelet he had recently eaten. Repeat EGD showed duodenitis. He had recurrent bleeding up until last evening. Differentials include diverticular bleeding, small bowel or colonic AVM possible. No bleeding since last evening. Hgb has been stable overnight.    Plan  -Monitor hemoglobin, transfuse to keep hgb >7  -OK to advance diet to full liquids   -Monitor stools   -If he develops recurrent bleeding = CT Angiogram   -Possible PillCam pending course (this is usually an outpatient study). Will discuss with Dr. Lyndsay Jarrett, [facility]  Cell: [phone] until 12PM  Office: [phone]

## 2022-02-07 NOTE — PROGRESS NOTES
Brief GI Note:    Hgb stable at 8.7 at 1400 and 1750 this afternoon. Can recheck hemoglobin next in the early morning, unless concern for active bleeding.   -Repeat hgb in AM  -Plan is to monitor over night  -Please contact GI if concern for active/recurrent bleeding. In that case, would consider bowel prep for potential colonoscopy +/- Pill cam.    Ish Ambriz MD  [facility]  [phone]

## 2022-02-07 NOTE — PLAN OF CARE
A&Ox4. VSS on RA. Ind in the room. Complains of chest muscle soreness post vomiting. No nausea. No BMs this shift. Last Hgb 9.4. Ambulating in [place]. Showered. Diet advanced to Full liquids. Tolerating well.

## 2022-02-07 NOTE — PLAN OF CARE
A&Ox4, VSS on RA. No bowel movement over night. Indep in room. Continuing to check hgb q6h resulting at 8.2 at 0000, 0600 results pending. PIV infusing NS at 50ml/hr. Tolerating clear liquid diet. Discharge pending continued improvement

## 2022-02-07 NOTE — CONSULTS
Care Management Initial Consult    General Information  Assessment completed with: Patient, patient and spouse Esmer at bedside  Type of CM/SW Visit: CM Role Introduction    Primary Care Provider verified and updated as needed: Yes   Readmission within the last 30 days: previous discharge plan unsuccessful   Return Category: Exacerbation of disease  Reason for Consult: discharge planning  Advance Care Planning:            Communication Assessment  Patient's communication style: spoken language (English or Bilingual)    Hearing Difficulty or Deaf: no   Wear Glasses or Blind: no    Cognitive  Cognitive/Neuro/Behavioral: WDL                      Living Environment:   People in home: spouse  Esmer  Current living Arrangements: house      Able to return to prior arrangements: yes       Family/Social Support:  Care provided by: self  Provides care for: no one  Marital Status:   Wife  Esmer       Description of Support System: Supportive,Involved    Support Assessment: Adequate family and caregiver support,Adequate social supports    Current Resources:   Patient receiving home care services: No     Community Resources:    Equipment currently used at home: none  Supplies currently used at home:      Employment/Financial:  Employment Status: retired        Financial Concerns: No concerns identified           Lifestyle & Psychosocial Needs:  Social Determinants of Health     Tobacco Use: High Risk     Smoking Tobacco Use: Light Tobacco Smoker     Smokeless Tobacco Use: Never Used   Alcohol Use: Not on file   Financial Resource Strain: Not on file   Food Insecurity: Not on file   Transportation Needs: Not on file   Physical Activity: Not on file   Stress: Not on file   Social Connections: Not on file   Intimate Partner Violence: Not on file   Depression: Not at risk     PHQ-2 Score: 0   Housing Stability: Not on file       Functional Status:  Prior to admission patient needed assistance: Independent                     Additional Information:  Writer met with both patient and spouse at the bedside. Explained role and scope of discharge planning.  Writer recalled observation status last Friday and patient was unfortunately readmitted for further GI work up.  Patient informs this writer that he feels like he is no longer bleeding and just had cream of wheat.  He is awaiting GI MD to see him.  Most likely ready for discharge to home tomorrow with PCP follow up and possibly GI.  Patient and spouse are aware that we will continue to follow for further discharge planning and recommendations.    Christine Melendez RN, BSN, ACM   Care Transitions Specialist   [facility]  Care Transitions Specialist   [address] [place]. [address]  [email or web address]  Office:[phone] Fax: [phone]  [facility]

## 2022-02-07 NOTE — PROGRESS NOTES
[facility]    Medicine Progress Note - Hospitalist Service        Date of Admission:  2/4/2022  7:33 PM    Assessment & Plan:   Jakub Robles is 71, who was recently admitted from 02/03/2022 to 02/04/2022 with duodenitis, GIB and blood loss anemia and was discharged, now returns just within a few hours discharge with hematemesis and worsening acute blood loss anemia.     Acute gastrointestinal bleed  Acute blood loss anemia  Syncope most likely due to acute blood loss anemia  -EGD done on 2/3/2022 showed duodenitis with few localized erosions in the duodenal bulb, very friable and small amount of oozing with passage of the scope no blood was seen downstream in the duodenum.  -Subsequent colonoscopy on 2/4/2022 showed few small-mouthed diverticula in the sigmoid colon, large amount of old, liquid blood suctioned from the left colon.  No fresh blood or active bleeding seen.  It was felt that patient likely had a sigmoid diverticular bleed  -Hemoglobin at discharge was 8.3, presented with hematemesis at home with hemoglobin bonnie of 5.7 early this morning at 3 AM  -He is s/p 4 units of PRBC altogether  -Continue Protonix 40 mg IV twice daily  -[facility] following, repeat endoscopy on 2/6 was essentially unchanged, it did show duodenitis but no evidence of active bleeding.  -Hemoglobin has stabilized around 8.5, no clinical evidence of ongoing bleed.  -Await GI follow-up this morning, however with stable hemoglobin and no clinical evidence of bleeding, likely will not pursue any additional testing or work-up  -If stable, likely discharge home later today or tomorrow morning.    Suspected aspiration pneumonitis and transient hypoxia  -The patient had a witnessed aspiration event and initially was on oxygen at 13 L via nonrebreather.    -Initial chest x-ray does not show any infiltrates  -Patient now completely weaned off oxygen and saturating normally on room air  -Likely initial hypoxia was  "related to the aspiration event which is now resolved  -Repeat chest x-ray today does not show any infiltrates. No new pulmonary symptoms. Afebrile.  Antibiotics discontinued 2/6     Coronary artery disease s/p PCI in 2016  Hypertension  Hyperlipidemia  -Hold prior to admission aspirin  -Blood pressure now starting to creep up on the higher side, will resume prior to admission amlodipine 5 mg p.o. daily.  Continue to hold losartan for now, resume as clinically indicated    Diabetes mellitus type II   -Hold prior to admission glimepiride and Metformin  -Continue sliding scale insulin  -Advance diet to moderate carb diet today      Diet: Clear Liquid Diet     DVT Prophylaxis: Pneumatic Compression Devices   Maguire Catheter: Not present  Code Status: Full Code     Disposition Plan    Expected Discharge: 2/8/2022  Anticipated discharge location:  Awaiting care coordination huddle  Delays:          Entered: Calixto Morgan MD 02/07/2022, 10:22 AM        Clinically Significant Risk Factors Present on Admission              # Diabetes, type II: last A1C 7.4 % (Ref range: 0.0 - 5.6 %)  # Overweight: Estimated body mass index is 29.35 kg/m  as calculated from the following:    Height as of this encounter: 1.727 m (5' 8\").    Weight as of this encounter: 87.5 kg (193 lb).        The patient's care was discussed with the Bedside Nurse, Patient and Patient's Family.    Calixto Morgan MD  Hospitalist Service  [facility]  Text Page 7AM-6PM  Securely message with the Vocera Web Console (learn more here)  Text page via [employer] Paging/Directory    ______________________________________________________________________    Interval History     Repeat endoscopy yesterday was essentially unchanged from prior.  It did show duodenitis but no signs of recent active bleeding or stigmata of bleeding.  Hemoglobin has stabilized around 8.5.  Clinically no evidence of ongoing bleeding.  Patient denies any hematochezia " "or melena overnight or this morning.  Denies lightheadedness or dizziness.  No cough.  No fever    Data reviewed today: I reviewed all medications, new labs and imaging results over the last 24 hours. I personally reviewed no images or EKG's today.    Physical Exam   Vital signs:  Temp: 98.8  F (37.1  C) Temp src: Oral BP: (!) 152/76 Pulse: 82   Resp: 16 SpO2: 97 % O2 Device: None (Room air) Oxygen Delivery: 1 LPM Height: 172.7 cm (5' 8\") Weight: 87.5 kg (193 lb)  Estimated body mass index is 29.35 kg/m  as calculated from the following:    Height as of this encounter: 1.727 m (5' 8\").    Weight as of this encounter: 87.5 kg (193 lb).      Wt Readings from Last 2 Encounters:   02/06/22 87.5 kg (193 lb)   12/13/21 90.6 kg (199 lb 12.8 oz)       Gen: AAOX3, NAD, comfortable  HEENT: pallor +  Resp: CTA B/L, normal WOB  CVS: RRR, no murmur  Abd/GI: Soft, non-tender. BS- normoactive.   Skin: Warm, dry no rashes  MSK:  no pedal edema  Neuro- CN- intact. No focal deficits.  Spontaneously moving all 4 extremities      Data   Recent Labs   Lab 02/07/22  0715 02/07/22  0634 02/07/22  0201 02/06/22  2349 02/06/22  2205 02/06/22  1801 02/06/22  1750 02/05/22  1145 02/05/22  1040 02/05/22  0309 02/04/22  1948 02/04/22  1947 02/04/22  0610 02/04/22  0547 02/03/22  1214 02/03/22  0736   WBC 9.7  --   --   --   --   --   --   --  13.7*  --  14.4*  --   --  5.8  --  11.2*   HGB 8.7*  8.7*  --   --  8.2*  --   --  8.7*   < > 7.0*  7.0*  7.0*   < > 6.2* 6.1*  --  7.7*  7.7*   < > 9.3*   MCV 90  --   --   --   --   --   --   --  91  --  94  --   --  90  --  92     --   --   --   --   --   --   --  144*  --  190  --   --  192  --  229   INR  --   --   --   --   --   --   --   --   --   --  1.29*  --   --   --   --  1.17*   NA  --   --   --   --   --   --   --   --  137  --  136 138  --  137  --  133   POTASSIUM  --   --   --   --   --   --   --   --  3.8  --  3.8 3.9  --  3.9   < > 5.4*   CHLORIDE  --   --   --   --   --   -- "   --   --  109  --  107  --   --  108  --  103   CO2  --   --   --   --   --   --   --   --  24  --  26  --   --  25  --  27   BUN  --   --   --   --   --   --   --   --  14  --  12  --   --  15  --  31*   CR  --   --   --   --   --   --   --   --  0.89  --  0.84  --   --  0.75  --  0.80   ANIONGAP  --   --   --   --   --   --   --   --  4  --  3  --   --  4  --  3   VALENTIN  --   --   --   --   --   --   --   --  7.4*  --  7.7*  --   --  8.1*  --  8.2*   GLC  --  128* 134*  --  132*   < >  --    < > 191*   < > 248*  --    < > 190*   < > 282*   ALBUMIN  --   --   --   --   --   --   --   --   --   --  2.9*  --   --   --   --  3.4   PROTTOTAL  --   --   --   --   --   --   --   --   --   --  5.4*  --   --   --   --  6.4*   BILITOTAL  --   --   --   --   --   --   --   --   --   --  0.2  --   --   --   --  0.3   ALKPHOS  --   --   --   --   --   --   --   --   --   --  36*  --   --   --   --  42   ALT  --   --   --   --   --   --   --   --   --   --  17  --   --   --   --  21   AST  --   --   --   --   --   --   --   --   --   --  12  --   --   --   --  11   LIPASE  --   --   --   --   --   --   --   --   --   --   --   --   --   --   --  85    < > = values in this interval not displayed.       No results found for this or any previous visit (from the past 24 hour(s)).  Medications     sodium chloride 50 mL/hr at 02/06/22 7671       fentaNYL  50 mcg Intravenous Once within 24 hrs     insulin aspart  1-6 Units Subcutaneous Q4H     midazolam  1 mg Intravenous Once within 24 hrs     pantoprazole (PROTONIX) IV  40 mg Intravenous Q12H     sodium chloride (PF)  3 mL Intracatheter Q8H

## 2022-02-08 VITALS
BODY MASS INDEX: 29.28 KG/M2 | TEMPERATURE: 97.9 F | SYSTOLIC BLOOD PRESSURE: 123 MMHG | DIASTOLIC BLOOD PRESSURE: 67 MMHG | HEIGHT: 68 IN | HEART RATE: 73 BPM | RESPIRATION RATE: 18 BRPM | OXYGEN SATURATION: 97 % | WEIGHT: 193.2 LBS

## 2022-02-08 LAB
GLUCOSE BLDC GLUCOMTR-MCNC: 150 MG/DL (ref 70–99)
GLUCOSE BLDC GLUCOMTR-MCNC: 160 MG/DL (ref 70–99)
GLUCOSE BLDC GLUCOMTR-MCNC: 163 MG/DL (ref 70–99)
GLUCOSE BLDC GLUCOMTR-MCNC: 266 MG/DL (ref 70–99)
HGB BLD-MCNC: 8.5 G/DL (ref 13.3–17.7)
HGB BLD-MCNC: 8.9 G/DL (ref 13.3–17.7)
HOLD SPECIMEN: NORMAL
INR PPP: 1.25 (ref 0.85–1.15)

## 2022-02-08 PROCEDURE — 258N000003 HC RX IP 258 OP 636: Performed by: INTERNAL MEDICINE

## 2022-02-08 PROCEDURE — 250N000013 HC RX MED GY IP 250 OP 250 PS 637: Performed by: INTERNAL MEDICINE

## 2022-02-08 PROCEDURE — 99239 HOSP IP/OBS DSCHRG MGMT >30: CPT | Performed by: INTERNAL MEDICINE

## 2022-02-08 PROCEDURE — 36415 COLL VENOUS BLD VENIPUNCTURE: CPT | Performed by: NURSE PRACTITIONER

## 2022-02-08 PROCEDURE — 250N000011 HC RX IP 250 OP 636: Performed by: INTERNAL MEDICINE

## 2022-02-08 PROCEDURE — 85018 HEMOGLOBIN: CPT | Performed by: NURSE PRACTITIONER

## 2022-02-08 PROCEDURE — 85610 PROTHROMBIN TIME: CPT | Performed by: NURSE PRACTITIONER

## 2022-02-08 PROCEDURE — C9113 INJ PANTOPRAZOLE SODIUM, VIA: HCPCS | Performed by: INTERNAL MEDICINE

## 2022-02-08 RX ORDER — PANTOPRAZOLE SODIUM 40 MG/1
40 TABLET, DELAYED RELEASE ORAL
Status: DISCONTINUED | OUTPATIENT
Start: 2022-02-08 | End: 2022-02-08 | Stop reason: HOSPADM

## 2022-02-08 RX ADMIN — INSULIN ASPART 3 UNITS: 100 INJECTION, SOLUTION INTRAVENOUS; SUBCUTANEOUS at 10:16

## 2022-02-08 RX ADMIN — PANTOPRAZOLE SODIUM 40 MG: 40 INJECTION, POWDER, FOR SOLUTION INTRAVENOUS at 02:01

## 2022-02-08 RX ADMIN — INSULIN ASPART 1 UNITS: 100 INJECTION, SOLUTION INTRAVENOUS; SUBCUTANEOUS at 06:36

## 2022-02-08 RX ADMIN — SODIUM CHLORIDE: 9 INJECTION, SOLUTION INTRAVENOUS at 06:36

## 2022-02-08 RX ADMIN — INSULIN ASPART 1 UNITS: 100 INJECTION, SOLUTION INTRAVENOUS; SUBCUTANEOUS at 02:00

## 2022-02-08 RX ADMIN — PANTOPRAZOLE SODIUM 40 MG: 40 TABLET, DELAYED RELEASE ORAL at 08:52

## 2022-02-08 ASSESSMENT — MIFFLIN-ST. JEOR: SCORE: 1610.85

## 2022-02-08 ASSESSMENT — ACTIVITIES OF DAILY LIVING (ADL)
ADLS_ACUITY_SCORE: 6
ADLS_ACUITY_SCORE: 5
ADLS_ACUITY_SCORE: 6
ADLS_ACUITY_SCORE: 6
ADLS_ACUITY_SCORE: 5
ADLS_ACUITY_SCORE: 6
ADLS_ACUITY_SCORE: 5
ADLS_ACUITY_SCORE: 6
ADLS_ACUITY_SCORE: 6
ADLS_ACUITY_SCORE: 5
ADLS_ACUITY_SCORE: 6
ADLS_ACUITY_SCORE: 5
ADLS_ACUITY_SCORE: 6

## 2022-02-08 NOTE — PROVIDER NOTIFICATION
"Paged Dr. Ambriz at [phone]    Per pt and Endo nurse, it was reported that MD would round on pt to discuss EGD results and plan going forward.     MD unable to come to pt room, but agreed to discuss with pt and wife via telephone. Pt and wife feeling very frustrated with course of treatment, feeling like \"we're running in circles\". Updated Dr Ambriz and Dr Morgan.   "

## 2022-02-08 NOTE — PLAN OF CARE
A/O x4, VSS on RA. Independent in room, ambulating in halls. Hgb checks q6h, last at 9.2. Denies pain/N/V. IV infiltrated in L arm, warm compress and elevation. New PIV in R arm, infusing NS at 50mL/hr. One small dark BM this shift, continent bladder. Full liquid diet, tolerating well. Expected early discharge tomorrow to home.

## 2022-02-08 NOTE — PROGRESS NOTES
A&Ox4. VSS on RA. Denies pain. 3-4 small loose dark red stools stools today. Repeat EGD completed, no source of bleeding found. Repeat hgb 8.7. Chest xray negative, zosyn discontinued. PIV infusing at 50mL/hr. Per pt and family request, Dr Ambriz discussed EDG results and plan for tonight/tomorrow. Up with SBA. Discharge pending progress.

## 2022-02-08 NOTE — CONSULTS
Care Management Discharge Note    Discharge Date: 02/08/2022       Discharge Disposition:  Home with PCP follow up Hgb redraw 2/11/22     Discharge Services: None    Discharge DME: None    Discharge Transportation: family or friend will provide    Private pay costs discussed: Not applicable    PAS Confirmation Code:  n/a  Patient/family educated on Medicare website which has current facility and service quality ratings: no    Education Provided on the Discharge Plan:  yes  Persons Notified of Discharge Plans: bedside RN patient and spouse at the bedside  Patient/Family in Agreement with the Plan: yes    Handoff Referral Completed: Yes    Additional Information:  Writer met with patient and spouse at the bedside. Patient is cleared for discharge to home with hgb and PCP follow up needs Hgb 2/11/22.  Patient and spouse agreeable to have this writer schedule follow up for 2/11 this is in the AVS.  Patient and spouse are aware that [facility] will call them regarding scheduling and follow up appointment.    Will put [facility] number in discharge paperwork for follow up.  Patient and spouse identify no further needs for discharge.      Christine Melendez RN, BSN, ACM   Care Transitions Specialist   [facility]  Care Transitions Specialist   [address] [place]. [address]  [email or web address]  Office:[phone] Fax: [phone]  [facility]

## 2022-02-08 NOTE — PLAN OF CARE
A&Ox4, VSS on RA. Independent in rooms. Hgb checks q6h, last at 8.5. Denies pain except where L arm IV infiltrated last shift, says that spot is  with movement or pressure. PIV in R arm infusing NS @ 50 mL/hr. Full liquid diet, tolerating well. Discharge anticipated early today to home.

## 2022-02-08 NOTE — PROGRESS NOTES
"AL[facility] Progress Note     Interval History:  GI bleeding stopped 2 nights ago. Tolerating full liquids. No recurrent bleeding. Wife at bedside.    Physical Exam:    /67 (BP Location: Left arm)   Pulse 73   Temp 97.9  F (36.6  C) (Oral)   Resp 18   Ht 1.727 m (5' 8\")   Wt 87.6 kg (193 lb 3.2 oz)   SpO2 97%   BMI 29.38 kg/m    Temp (24hrs), Av  F (36.7  C), Min:97.2  F (36.2  C), Max:98.8  F (37.1  C)    Patient Vitals for the past 72 hrs:   Weight   22 0701 87.6 kg (193 lb 3.2 oz)   22 1059 87.5 kg (193 lb)   22 0612 87.9 kg (193 lb 12.8 oz)       Intake/Output Summary (Last 24 hours) at 2022 1039  Last data filed at 2022 1822  Gross per 24 hour   Intake 353 ml   Output 2230 ml   Net -1877 ml       Constitutional: No acute distress  Cardiovascular: RRR, normal S1/S2  Abdomen: Soft, nondistended, nontender    Laboratory Data  Recent Labs   Lab Test 22  0601 22  2351 22  1740 22  1250 22  0715 22  1145 22  1040 22  0334 22  1948 22  1214 22  0736   WBC  --   --   --   --  9.7  --  13.7*  --  14.4*   < > 11.2*   HGB 8.9* 8.5* 9.2*   < > 8.7*  8.7*   < > 7.0*  7.0*  7.0*   < > 6.2*   < > 9.3*   MCV  --   --   --   --  90  --  91  --  94   < > 92   PLT  --   --   --   --  162  --  144*  --  190   < > 229   INR  --   --   --   --   --   --   --   --  1.29*  --  1.17*    < > = values in this interval not displayed.     Recent Labs   Lab Test 22  1040 22  0547    136 138 137   POTASSIUM 3.8 3.8 3.9 3.9   CHLORIDE 109 107  --  108   CO2 24 26  --  25   BUN 14 12  --  15   CR 0.89 0.84  --  0.75   ANIONGAP 4 3  --  4   VALENTIN 7.4* 7.7*  --  8.1*     Recent Labs   Lab Test 22  0736 21  0918 20  0746 19  1114 18  0836 17  0758 17  0757   ALBUMIN 2.9* 3.4 4.3  --  4.5  --    < >  --    BILITOTAL 0.2 0.3 0.6  --  0.5  --    " < >  --    ALT 17 21 27   < > 24  --    < >  --    AST 12 11 19  --  15  --    < >  --    ALKPHOS 36* 42 51  --  46  --    < >  --    PROTEIN  --   --   --   --  Negative Negative  --  Negative   LIPASE  --  85  --   --   --   --   --   --     < > = values in this interval not displayed.       Imaging  CT ABDOMEN AND PELVIS WITH CONTRAST 2/3/2022 8:56 AM                                                        IMPRESSION:   1.  No acute process demonstrated.  2.  3.1 cm infrarenal abdominal aortic aneurysm.    Endoscopic Workup  EGD 2/3/22  Impression:               - Z-line regular, 38 cm from the incisors.                             - Hiatal hernia.                             - Duodenitis which has likely bled, but did not                             appear severe enough to cause bloody stools and a                             drop in hemoglobin. It is likely from the aspirin.                             - No specimens collected.     Colonoscopy 2/4/22  Findings:   - no active bleeding the colon  - left sided diverticula with old blood  - light brown fluid in the right colon and terminal ileum    EGD 2/6/22  Impression:               - Normal esophagus.                             - Normal stomach.                             - Duodenitis. No signs of recent active bleeding or                             stigmata of bleeding. This could have been a source                             of bleeding at time of admission, but no signs that                             it has been bleeding in recent hours. If there is                             continued bleeding, then would suspect another                             source.   Assessment & Plan:  Overt, obscure GI bleed   70 y/o man with CAD (on chronic aspirin 81 mg daily, held since recent admission on 2/2/22), recent admission for suspected sigmoid diverticular bleed (colonoscopy showed ticks with old blood in left colon, brown stool in right colon) also with friable  duodenitis, likely due to chronic aspirin use), who presented with acute anemia, syncope, recurrent GI bleeding 2/4 the day of initial discharge. It was thought that he may have had hematemesis but the pt thinks this was actually tomato's from an omelet he had recently eaten. Repeat EGD showed duodenitis. He had recurrent bleeding up until last evening. Differentials include diverticular bleeding, small bowel or colonic AVM possible. No bleeding since 2/6. Hgb has been stable,     INR has been elevated slightly. He has hx of hepatitis C in the past (treated 10 years ago). Fatty liver on imaging but no apparent nodularity. LFTs are normal. He is interested in an outpt appt for his liver.     Plan  -Monitor hemoglobin, transfuse to keep hgb >7. Will check INR to see if still elevated  -OK to advance diet to regular   -Monitor stools   -If he develops recurrent bleeding = CT Angiogram   -PillCam outpt  -Will plan on liver clinic f/u outpt -- we will call him to arrange PillCam and clinic appts    Will discuss with Dr. Lyndsay Jarrett, [facility]  Cell: [phone] until 12PM  Office: [phone]

## 2022-02-08 NOTE — DISCHARGE SUMMARY
Discharge Note    Patient discharged to home via private vehicle  accompanied by significant other .  IV: Discontinued  Prescriptions N/A.   Belongings reviewed and sent with patient.   Home medications returned to patient: NA  Equipment sent with: patient, N/A.   patient verbalizes understanding of discharge instructions. AVS given to patient.

## 2022-02-08 NOTE — DISCHARGE SUMMARY
[facility]    Discharge Summary  Hospitalist    Date of Admission:  2/4/2022  Date of Discharge:  2/8/2022  Discharging Provider: Calixto Morgan MD    Discharge Diagnoses      Acute gastrointestinal bleed  Acute blood loss anemia  Syncope most likely due to acute blood loss anemia   Suspected aspiration pneumonitis and transient hypoxia  Coronary artery disease s/p PCI in 2016  Hypertension  Hyperlipidemia  Diabetes mellitus type II     Hospital Course:    Jakub Robles is 71, who was recently admitted from 02/03/2022 to 02/04/2022 with duodenitis, GIB and blood loss anemia and was discharged, now returns just within a few hours discharge with hematemesis and worsening acute blood loss anemia.     Acute gastrointestinal bleed  Acute blood loss anemia  Syncope most likely due to acute blood loss anemia  -EGD done on 2/3/2022 showed duodenitis with few localized erosions in the duodenal bulb, very friable and small amount of oozing with passage of the scope no blood was seen downstream in the duodenum.  -Subsequent colonoscopy on 2/4/2022 showed few small-mouthed diverticula in the sigmoid colon, large amount of old, liquid blood suctioned from the left colon.  No fresh blood or active bleeding seen.  It was felt that patient likely had a sigmoid diverticular bleed  -Hemoglobin at discharge was 8.3, presented with hematemesis at home with hemoglobin bonnie of 5.7 early this morning at 3 AM  -He is s/p 4 units of PRBC altogether  -[facility] following, repeat endoscopy on 2/6 was essentially unchanged, it did show duodenitis but no evidence of active bleeding.  -Hemoglobin has stabilized around 8.5-9, no clinical evidence of ongoing bleed.  -Continue Protonix 40 mg twice daily  -OP f/u with [facility] in 7-10 days     Suspected aspiration pneumonitis and transient hypoxia  -The patient had a witnessed aspiration event and initially was on oxygen at 13 L via nonrebreather.    -Initial chest  x-ray does not show any infiltrates  -Patient now completely weaned off oxygen and saturating normally on room air  -Likely initial hypoxia was related to the aspiration event which is now resolved  -Repeat chest x-ray today does not show any infiltrates. No new pulmonary symptoms. Afebrile.  Antibiotics discontinued 2/6     Coronary artery disease s/p PCI in 2016  Hypertension  Hyperlipidemia  -Hold prior to admission aspirin for 3 more days. Resume on 2/11 if HB stable and no further clinical evidence of bleeding  -resume prior to admission amlodipine and losartan for now     Diabetes mellitus type II   -Hold prior to admission glimepiride and Metformin  -Continue sliding scale insulin  -Advance diet to moderate carb diet today          Calixto Morgan MD    Significant Results and Procedures   See below    Pending Results     Unresulted Labs Ordered in the Past 30 Days of this Admission     Date and Time Order Name Status Description    2/5/2022  4:28 AM Blood Culture Hand, Left Preliminary     2/5/2022  4:28 AM Blood Culture Hand, Right Preliminary           Code Status   Full Code       Primary Care Physician   Markel Kauffman    Physical Exam   Temp: 97.9  F (36.6  C) Temp src: Oral BP: 123/67 Pulse: 73   Resp: 18 SpO2: 97 % O2 Device: None (Room air)      Constitutional: AAOX3, NAD  Respiratory: CTA B/L, Normal WOB  Cardiovascular: RRR, No murmur  GI: Soft, Non- tender, BS- normoactive  Neuro: CN- grossly intact, Motor strength 5/5 on all 4 extremities.     Discharge Disposition   Discharged to home  Condition at discharge: Stable    Consultations This Hospital Stay   GASTROENTEROLOGY IP CONSULT  CARE MANAGEMENT / SOCIAL WORK IP CONSULT    Time Spent on this Encounter   ICalixto MD, personally saw the patient today and spent greater than 30 minutes discharging this patient.    Discharge Orders      Follow-up and recommended labs and tests    Follow up with primary care provider, Markel Kauffman,  within 7 days for hospital follow- up.    [facility] in 1 week     Activity    Your activity upon discharge: activity as tolerated     Diet    Follow this diet upon discharge: Orders Placed This Encounter      Moderate Consistent Carb (60 g CHO per Meal) Diet     Discharge Medications   Current Discharge Medication List      CONTINUE these medications which have NOT CHANGED    Details   amLODIPine (NORVASC) 5 MG tablet TAKE 1 TABLET EVERY DAY  Qty: 90 tablet, Refills: 1    Associated Diagnoses: Essential hypertension with goal blood pressure less than 140/90      atorvastatin (LIPITOR) 40 MG tablet TAKE 1 TABLET EVERY DAY  Qty: 90 tablet, Refills: 3    Associated Diagnoses: Hyperlipidemia LDL goal <70      glimepiride (AMARYL) 2 MG tablet TAKE 1 TABLET EVERY MORNING BEFORE BREAKFAST (NEED 6 MONTH LAB TO CHECK A1C AND OFFICE VISIT BEFORE MORE REFILLS)  Qty: 90 tablet, Refills: 3    Associated Diagnoses: Type 2 diabetes mellitus with diabetic nephropathy, without long-term current use of insulin (H)      losartan (COZAAR) 100 MG tablet Take 1 tablet (100 mg) by mouth daily  Qty: 90 tablet, Refills: 3    Associated Diagnoses: Essential hypertension      metFORMIN (GLUCOPHAGE) 1000 MG tablet Take 1 tablet (1,000 mg) by mouth 2 times daily (with meals)  Qty: 180 tablet, Refills: 3    Associated Diagnoses: Type 2 diabetes mellitus with diabetic nephropathy, without long-term current use of insulin (H)      NITROGLYCERIN SL Place 0.4 mg under the tongue as needed for chest pain      omega 3 1000 MG CAPS Take 1 g by mouth daily  Qty: 90 capsule    Associated Diagnoses: Routine medical exam      pantoprazole (PROTONIX) 40 MG EC tablet Take 1 tablet (40 mg) by mouth 2 times daily (before meals)  Qty: 60 tablet, Refills: 1    Associated Diagnoses: Duodenitis with bleeding      sildenafil (VIAGRA) 100 MG tablet Take 1 tablet (100 mg) by mouth daily as needed (erectile dysfunction)  Qty: 30 tablet, Refills: 3    Associated Diagnoses:  Erectile dysfunction, unspecified erectile dysfunction type      vitamin C (ASCORBIC ACID) 250 MG tablet Take 2 tablets (500 mg) by mouth daily  Qty: 60 tablet, Refills: 0    Comments: Take with Iron to improve absorption.  Associated Diagnoses: Acute blood loss anemia      ACCU-CHEK RYAN PLUS test strip USE TO TEST BLOOD SUGAR TWICE DAILY  Qty: 200 strip, Refills: 1    Associated Diagnoses: Type 2 diabetes mellitus with diabetic neuropathy (H)      acetaminophen (TYLENOL) 325 MG tablet Take 2 tablets (650 mg) by mouth every 6 hours as needed for mild pain or other (and adjunct with moderate or severe pain or per patient request)  Refills: 0    Associated Diagnoses: Pain      blood glucose monitoring (ACCU-CHEK MULTICLIX) lancets by In Vitro route 2 times daily  Qty: 2 Box, Refills: 1    Comments: 200 each  Associated Diagnoses: Type 2 diabetes mellitus with diabetic neuropathy (H)      ferrous sulfate (FEROSUL) 325 (65 Fe) MG tablet Take 1 tablet (325 mg) by mouth daily (with breakfast)  Qty: 60 tablet, Refills: 1    Associated Diagnoses: Acute blood loss anemia         STOP taking these medications       aspirin (ASA) 81 MG chewable tablet Comments:   Reason for Stopping:             Allergies   No Known Allergies  Data   Most Recent 3 CBC's:  Recent Labs   Lab Test 02/08/22  0601 02/07/22  2351 02/07/22  1740 02/07/22  1250 02/07/22  0715 02/05/22  1145 02/05/22  1040 02/05/22  0334 02/04/22 1948   WBC  --   --   --   --  9.7  --  13.7*  --  14.4*   HGB 8.9* 8.5* 9.2*   < > 8.7*  8.7*   < > 7.0*  7.0*  7.0*   < > 6.2*   MCV  --   --   --   --  90  --  91  --  94   PLT  --   --   --   --  162  --  144*  --  190    < > = values in this interval not displayed.      Most Recent 3 BMP's:  Recent Labs   Lab Test 02/08/22  0955 02/08/22  0617 02/08/22  0215 02/05/22  1504 02/05/22  1040 02/05/22  0309 02/04/22 1948 02/04/22 1947 02/04/22 0610 02/04/22  0547   NA  --   --   --   --  137  --  136 138  --  137    POTASSIUM  --   --   --   --  3.8  --  3.8 3.9  --  3.9   CHLORIDE  --   --   --   --  109  --  107  --   --  108   CO2  --   --   --   --  24  --  26  --   --  25   BUN  --   --   --   --  14  --  12  --   --  15   CR  --   --   --   --  0.89  --  0.84  --   --  0.75   ANIONGAP  --   --   --   --  4  --  3  --   --  4   VALENTIN  --   --   --   --  7.4*  --  7.7*  --   --  8.1*   * 150* 160*   < > 191*   < > 248*  --    < > 190*    < > = values in this interval not displayed.     Most Recent 2 LFT's:  Recent Labs   Lab Test 02/04/22  1948 02/03/22  0736   AST 12 11   ALT 17 21   ALKPHOS 36* 42   BILITOTAL 0.2 0.3     Most Recent INR's and Anticoagulation Dosing History:  Anticoagulation Dose History     Recent Dosing and Labs Latest Ref Rng & Units 2/3/2022 2/4/2022    INR 0.85 - 1.15 1.17(H) 1.29(H)        Most Recent 3 Troponin's:No lab results found.  Most Recent Cholesterol Panel:  Recent Labs   Lab Test 07/12/21  0918   CHOL 131   LDL 42   HDL 79   TRIG 51     Most Recent 6 Bacteria Isolates From Any Culture (See EPIC Reports for Culture Details):No lab results found.  Most Recent TSH, T4 and A1c Labs:  Recent Labs   Lab Test 02/04/22  0547 09/06/19  1114 08/31/18  0821   TSH  --   --  1.73   A1C 7.4*   < > 6.5*    < > = values in this interval not displayed.       Results for orders placed or performed during the hospital encounter of 02/04/22   XR Chest 2 Views    Narrative    EXAM: XR CHEST 2 VW  LOCATION: [facility]  DATE/TIME: 2/4/2022 7:53 PM    INDICATION: Aspiration.  COMPARISON: None.      Impression    IMPRESSION: Negative chest.   XR Chest Port 1 View    Narrative    EXAM: XR CHEST PORTABLE 1 VIEW  LOCATION: [facility]  DATE/TIME: 02/06/2022, 8:50 AM    INDICATION: Follow-up aspiration.  COMPARISON: 02/04/2022.      Impression    IMPRESSION: Negative chest.

## 2022-02-08 NOTE — PROGRESS NOTES
A&Ox4. VSS on RA. Denies pain. LS clear. BS active- 3 small brown, loose stools. No blood or s/sx bleeding noted. Mod carb diet, tolerated lunch well. Denies nausea. ind in room.  PIV SL. Plan to discharge this afternoon.

## 2022-02-09 ENCOUNTER — PATIENT OUTREACH (OUTPATIENT)
Dept: CARE COORDINATION | Facility: CLINIC | Age: 72
End: 2022-02-09
Payer: MEDICARE

## 2022-02-09 NOTE — PROGRESS NOTES
Clinic Care Coordination Contact  [facility]/Voicemail    Referral Source: IP Report  Clinical Data: Care Coordinator Outreach  Outreach attempted x 1.  Left message on patient's voicemail with call back information and requested return call.    Plan: Care Coordinator will try to reach patient again in 1-2 business days.     Gracy Cox RN, BSN, PHN  Primary Care / Care Coordinator   [facility]  E-mail [email or web address]   [phone]

## 2022-02-10 ENCOUNTER — HOSPITAL ENCOUNTER (INPATIENT)
Facility: CLINIC | Age: 72
LOS: 5 days | Discharge: HOME | DRG: 378 | End: 2022-02-15
Attending: EMERGENCY MEDICINE | Admitting: HOSPITALIST
Payer: MEDICARE

## 2022-02-10 ENCOUNTER — APPOINTMENT (OUTPATIENT)
Dept: NUCLEAR MEDICINE | Facility: CLINIC | Age: 72
DRG: 378 | End: 2022-02-10
Attending: INTERNAL MEDICINE
Payer: MEDICARE

## 2022-02-10 ENCOUNTER — PATIENT OUTREACH (OUTPATIENT)
Dept: CARE COORDINATION | Facility: CLINIC | Age: 72
End: 2022-02-10

## 2022-02-10 ENCOUNTER — APPOINTMENT (OUTPATIENT)
Dept: CT IMAGING | Facility: CLINIC | Age: 72
DRG: 378 | End: 2022-02-10
Attending: EMERGENCY MEDICINE
Payer: MEDICARE

## 2022-02-10 DIAGNOSIS — D62 ANEMIA DUE TO BLOOD LOSS, ACUTE: ICD-10-CM

## 2022-02-10 DIAGNOSIS — K92.2 GASTROINTESTINAL HEMORRHAGE, UNSPECIFIED GASTROINTESTINAL HEMORRHAGE TYPE: ICD-10-CM

## 2022-02-10 LAB
ABO/RH(D): NORMAL
ALBUMIN SERPL-MCNC: 3 G/DL (ref 3.4–5)
ALP SERPL-CCNC: 40 U/L (ref 40–150)
ALT SERPL W P-5'-P-CCNC: 15 U/L (ref 0–70)
ANION GAP SERPL CALCULATED.3IONS-SCNC: 6 MMOL/L (ref 3–14)
ANTIBODY SCREEN: NEGATIVE
AST SERPL W P-5'-P-CCNC: 11 U/L (ref 0–45)
ATRIAL RATE - MUSE: 87 BPM
BACTERIA BLD CULT: NO GROWTH
BACTERIA BLD CULT: NO GROWTH
BASOPHILS # BLD AUTO: 0 10E3/UL (ref 0–0.2)
BASOPHILS NFR BLD AUTO: 0 %
BILIRUB SERPL-MCNC: 0.2 MG/DL (ref 0.2–1.3)
BLD PROD TYP BPU: NORMAL
BLOOD COMPONENT TYPE: NORMAL
BUN SERPL-MCNC: 22 MG/DL (ref 7–30)
CALCIUM SERPL-MCNC: 8.4 MG/DL (ref 8.5–10.1)
CHLORIDE BLD-SCNC: 106 MMOL/L (ref 94–109)
CO2 SERPL-SCNC: 25 MMOL/L (ref 20–32)
CODING SYSTEM: NORMAL
CREAT SERPL-MCNC: 1 MG/DL (ref 0.66–1.25)
CROSSMATCH: NORMAL
DIASTOLIC BLOOD PRESSURE - MUSE: NORMAL MMHG
EOSINOPHIL # BLD AUTO: 0.4 10E3/UL (ref 0–0.7)
EOSINOPHIL NFR BLD AUTO: 6 %
ERYTHROCYTE [DISTWIDTH] IN BLOOD BY AUTOMATED COUNT: 14.5 % (ref 10–15)
FLEXIBLE SIGMOIDOSCOPY: NORMAL
GFR SERPL CREATININE-BSD FRML MDRD: 81 ML/MIN/1.73M2
GLUCOSE BLD-MCNC: 253 MG/DL (ref 70–99)
GLUCOSE BLDC GLUCOMTR-MCNC: 119 MG/DL (ref 70–99)
GLUCOSE BLDC GLUCOMTR-MCNC: 135 MG/DL (ref 70–99)
GLUCOSE BLDC GLUCOMTR-MCNC: 153 MG/DL (ref 70–99)
GLUCOSE BLDC GLUCOMTR-MCNC: 167 MG/DL (ref 70–99)
GLUCOSE BLDC GLUCOMTR-MCNC: 181 MG/DL (ref 70–99)
HCT VFR BLD AUTO: 22.7 % (ref 40–53)
HGB BLD-MCNC: 5.4 G/DL (ref 13.3–17.7)
HGB BLD-MCNC: 7.1 G/DL (ref 13.3–17.7)
HGB BLD-MCNC: 7.3 G/DL (ref 13.3–17.7)
HGB BLD-MCNC: 8 G/DL (ref 13.3–17.7)
HGB BLD-MCNC: 8 G/DL (ref 13.3–17.7)
IMM GRANULOCYTES # BLD: 0 10E3/UL
IMM GRANULOCYTES NFR BLD: 0 %
INR PPP: 1.01 (ref 0.85–1.15)
INTERPRETATION ECG - MUSE: NORMAL
ISSUE DATE AND TIME: NORMAL
LYMPHOCYTES # BLD AUTO: 1 10E3/UL (ref 0.8–5.3)
LYMPHOCYTES NFR BLD AUTO: 13 %
MCH RBC QN AUTO: 29.4 PG (ref 26.5–33)
MCHC RBC AUTO-ENTMCNC: 32.2 G/DL (ref 31.5–36.5)
MCV RBC AUTO: 92 FL (ref 78–100)
MONOCYTES # BLD AUTO: 1.1 10E3/UL (ref 0–1.3)
MONOCYTES NFR BLD AUTO: 15 %
NEUTROPHILS # BLD AUTO: 4.9 10E3/UL (ref 1.6–8.3)
NEUTROPHILS NFR BLD AUTO: 66 %
NRBC # BLD AUTO: 0 10E3/UL
NRBC BLD AUTO-RTO: 0 /100
P AXIS - MUSE: 75 DEGREES
PLATELET # BLD AUTO: 240 10E3/UL (ref 150–450)
POTASSIUM BLD-SCNC: 4.1 MMOL/L (ref 3.4–5.3)
PR INTERVAL - MUSE: 156 MS
PROT SERPL-MCNC: 6.4 G/DL (ref 6.8–8.8)
QRS DURATION - MUSE: 92 MS
QT - MUSE: 366 MS
QTC - MUSE: 440 MS
R AXIS - MUSE: 62 DEGREES
RBC # BLD AUTO: 2.48 10E6/UL (ref 4.4–5.9)
SARS-COV-2 RNA RESP QL NAA+PROBE: NEGATIVE
SODIUM SERPL-SCNC: 137 MMOL/L (ref 133–144)
SPECIMEN EXPIRATION DATE: NORMAL
SYSTOLIC BLOOD PRESSURE - MUSE: NORMAL MMHG
T AXIS - MUSE: 49 DEGREES
UNIT ABO/RH: NORMAL
UNIT NUMBER: NORMAL
UNIT STATUS: NORMAL
UNIT TYPE ISBT: 6200
VENTRICULAR RATE- MUSE: 87 BPM
WBC # BLD AUTO: 7.5 10E3/UL (ref 4–11)

## 2022-02-10 PROCEDURE — 36415 COLL VENOUS BLD VENIPUNCTURE: CPT | Performed by: HOSPITALIST

## 2022-02-10 PROCEDURE — 99207 PR NO CHARGE LOS: CPT | Performed by: INTERNAL MEDICINE

## 2022-02-10 PROCEDURE — 85025 COMPLETE CBC W/AUTO DIFF WBC: CPT | Performed by: EMERGENCY MEDICINE

## 2022-02-10 PROCEDURE — 85018 HEMOGLOBIN: CPT | Performed by: INTERNAL MEDICINE

## 2022-02-10 PROCEDURE — 86901 BLOOD TYPING SEROLOGIC RH(D): CPT | Performed by: EMERGENCY MEDICINE

## 2022-02-10 PROCEDURE — 120N000001 HC R&B MED SURG/OB

## 2022-02-10 PROCEDURE — 85610 PROTHROMBIN TIME: CPT | Performed by: EMERGENCY MEDICINE

## 2022-02-10 PROCEDURE — 87635 SARS-COV-2 COVID-19 AMP PRB: CPT | Performed by: EMERGENCY MEDICINE

## 2022-02-10 PROCEDURE — C9113 INJ PANTOPRAZOLE SODIUM, VIA: HCPCS | Performed by: HOSPITALIST

## 2022-02-10 PROCEDURE — 86923 COMPATIBILITY TEST ELECTRIC: CPT | Performed by: INTERNAL MEDICINE

## 2022-02-10 PROCEDURE — 85018 HEMOGLOBIN: CPT | Performed by: HOSPITALIST

## 2022-02-10 PROCEDURE — 74174 CTA ABD&PLVS W/CONTRAST: CPT | Mod: MG

## 2022-02-10 PROCEDURE — 80053 COMPREHEN METABOLIC PANEL: CPT | Performed by: EMERGENCY MEDICINE

## 2022-02-10 PROCEDURE — 250N000011 HC RX IP 250 OP 636: Performed by: EMERGENCY MEDICINE

## 2022-02-10 PROCEDURE — 86923 COMPATIBILITY TEST ELECTRIC: CPT | Performed by: EMERGENCY MEDICINE

## 2022-02-10 PROCEDURE — 93005 ELECTROCARDIOGRAM TRACING: CPT

## 2022-02-10 PROCEDURE — 36430 TRANSFUSION BLD/BLD COMPNT: CPT

## 2022-02-10 PROCEDURE — P9016 RBC LEUKOCYTES REDUCED: HCPCS | Performed by: EMERGENCY MEDICINE

## 2022-02-10 PROCEDURE — 0DJD8ZZ INSPECTION OF LOWER INTESTINAL TRACT, VIA NATURAL OR ARTIFICIAL OPENING ENDOSCOPIC: ICD-10-PCS | Performed by: INTERNAL MEDICINE

## 2022-02-10 PROCEDURE — 99285 EMERGENCY DEPT VISIT HI MDM: CPT | Mod: 25

## 2022-02-10 PROCEDURE — 250N000011 HC RX IP 250 OP 636: Performed by: HOSPITALIST

## 2022-02-10 PROCEDURE — 45330 DIAGNOSTIC SIGMOIDOSCOPY: CPT | Performed by: INTERNAL MEDICINE

## 2022-02-10 PROCEDURE — 86923 COMPATIBILITY TEST ELECTRIC: CPT | Performed by: PHYSICIAN ASSISTANT

## 2022-02-10 PROCEDURE — 258N000003 HC RX IP 258 OP 636: Performed by: HOSPITALIST

## 2022-02-10 PROCEDURE — 99223 1ST HOSP IP/OBS HIGH 75: CPT | Mod: AI | Performed by: HOSPITALIST

## 2022-02-10 PROCEDURE — 250N000012 HC RX MED GY IP 250 OP 636 PS 637: Performed by: INTERNAL MEDICINE

## 2022-02-10 PROCEDURE — 343N000001 HC RX 343: Performed by: HOSPITALIST

## 2022-02-10 PROCEDURE — 78278 ACUTE GI BLOOD LOSS IMAGING: CPT | Mod: MG

## 2022-02-10 PROCEDURE — A9560 TC99M LABELED RBC: HCPCS | Performed by: HOSPITALIST

## 2022-02-10 PROCEDURE — 36415 COLL VENOUS BLD VENIPUNCTURE: CPT | Performed by: INTERNAL MEDICINE

## 2022-02-10 PROCEDURE — 250N000009 HC RX 250: Performed by: EMERGENCY MEDICINE

## 2022-02-10 PROCEDURE — 36415 COLL VENOUS BLD VENIPUNCTURE: CPT | Performed by: EMERGENCY MEDICINE

## 2022-02-10 PROCEDURE — C9803 HOPD COVID-19 SPEC COLLECT: HCPCS

## 2022-02-10 RX ORDER — ONDANSETRON 2 MG/ML
4 INJECTION INTRAMUSCULAR; INTRAVENOUS EVERY 6 HOURS PRN
Status: DISCONTINUED | OUTPATIENT
Start: 2022-02-10 | End: 2022-02-15 | Stop reason: HOSPADM

## 2022-02-10 RX ORDER — ACETAMINOPHEN 325 MG/1
650 TABLET ORAL EVERY 6 HOURS PRN
Status: DISCONTINUED | OUTPATIENT
Start: 2022-02-10 | End: 2022-02-15 | Stop reason: HOSPADM

## 2022-02-10 RX ORDER — ACETAMINOPHEN 650 MG/1
650 SUPPOSITORY RECTAL EVERY 6 HOURS PRN
Status: DISCONTINUED | OUTPATIENT
Start: 2022-02-10 | End: 2022-02-15 | Stop reason: HOSPADM

## 2022-02-10 RX ORDER — SODIUM CHLORIDE 9 MG/ML
INJECTION, SOLUTION INTRAVENOUS CONTINUOUS
Status: ACTIVE | OUTPATIENT
Start: 2022-02-10 | End: 2022-02-10

## 2022-02-10 RX ORDER — PROCHLORPERAZINE MALEATE 5 MG
5 TABLET ORAL EVERY 6 HOURS PRN
Status: DISCONTINUED | OUTPATIENT
Start: 2022-02-10 | End: 2022-02-15 | Stop reason: HOSPADM

## 2022-02-10 RX ORDER — NICOTINE POLACRILEX 4 MG
15-30 LOZENGE BUCCAL
Status: DISCONTINUED | OUTPATIENT
Start: 2022-02-10 | End: 2022-02-11

## 2022-02-10 RX ORDER — LIDOCAINE 40 MG/G
CREAM TOPICAL
Status: DISCONTINUED | OUTPATIENT
Start: 2022-02-10 | End: 2022-02-15 | Stop reason: HOSPADM

## 2022-02-10 RX ORDER — LIDOCAINE 40 MG/G
CREAM TOPICAL
Status: DISCONTINUED | OUTPATIENT
Start: 2022-02-10 | End: 2022-02-12

## 2022-02-10 RX ORDER — PROCHLORPERAZINE 25 MG
12.5 SUPPOSITORY, RECTAL RECTAL EVERY 12 HOURS PRN
Status: DISCONTINUED | OUTPATIENT
Start: 2022-02-10 | End: 2022-02-15 | Stop reason: HOSPADM

## 2022-02-10 RX ORDER — ONDANSETRON 4 MG/1
4 TABLET, ORALLY DISINTEGRATING ORAL EVERY 6 HOURS PRN
Status: DISCONTINUED | OUTPATIENT
Start: 2022-02-10 | End: 2022-02-15 | Stop reason: HOSPADM

## 2022-02-10 RX ORDER — DEXTROSE MONOHYDRATE 25 G/50ML
25-50 INJECTION, SOLUTION INTRAVENOUS
Status: DISCONTINUED | OUTPATIENT
Start: 2022-02-10 | End: 2022-02-12

## 2022-02-10 RX ORDER — NICOTINE POLACRILEX 4 MG
15-30 LOZENGE BUCCAL
Status: DISCONTINUED | OUTPATIENT
Start: 2022-02-10 | End: 2022-02-12

## 2022-02-10 RX ORDER — DEXTROSE MONOHYDRATE 25 G/50ML
25-50 INJECTION, SOLUTION INTRAVENOUS
Status: DISCONTINUED | OUTPATIENT
Start: 2022-02-10 | End: 2022-02-11

## 2022-02-10 RX ORDER — IOPAMIDOL 755 MG/ML
119 INJECTION, SOLUTION INTRAVASCULAR ONCE
Status: COMPLETED | OUTPATIENT
Start: 2022-02-10 | End: 2022-02-10

## 2022-02-10 RX ADMIN — SODIUM CHLORIDE: 9 INJECTION, SOLUTION INTRAVENOUS at 09:29

## 2022-02-10 RX ADMIN — ONDANSETRON 4 MG: 2 INJECTION INTRAMUSCULAR; INTRAVENOUS at 23:28

## 2022-02-10 RX ADMIN — Medication 27 MILLICURIE: at 15:46

## 2022-02-10 RX ADMIN — PANTOPRAZOLE SODIUM 40 MG: 40 INJECTION, POWDER, FOR SOLUTION INTRAVENOUS at 20:48

## 2022-02-10 RX ADMIN — IOPAMIDOL 119 ML: 755 INJECTION, SOLUTION INTRAVENOUS at 04:21

## 2022-02-10 RX ADMIN — SODIUM CHLORIDE 80 ML: 900 INJECTION INTRAVENOUS at 04:21

## 2022-02-10 RX ADMIN — INSULIN ASPART 2 UNITS: 100 INJECTION, SOLUTION INTRAVENOUS; SUBCUTANEOUS at 23:00

## 2022-02-10 RX ADMIN — PANTOPRAZOLE SODIUM 40 MG: 40 INJECTION, POWDER, FOR SOLUTION INTRAVENOUS at 09:29

## 2022-02-10 ASSESSMENT — ENCOUNTER SYMPTOMS
FEVER: 0
ABDOMINAL PAIN: 0
SHORTNESS OF BREATH: 1
WEAKNESS: 1
NAUSEA: 0
BLOOD IN STOOL: 1

## 2022-02-10 ASSESSMENT — ACTIVITIES OF DAILY LIVING (ADL)
ADLS_ACUITY_SCORE: 7

## 2022-02-10 ASSESSMENT — MIFFLIN-ST. JEOR
SCORE: 1625.82
SCORE: 1625.82

## 2022-02-10 NOTE — PROGRESS NOTES
Clinic Care Coordination Contact    Situation: Patient chart reviewed by care coordinator.    Background:   Patient was admitted to [facility] on 2/4/2022 with a diagnoses of Acute gastrointestinal bleed, acute blood loss anemia and discharged home on 2/8/2022.    Assessment:   Patient is currently at [facility] with shortness of breath, weakness and blood in stool.    Plan/Recommendations:   RNCC will reach out in 3-5 days after patient is discharged home.    Gracy Cox RN, BSN, PHN  Primary Care / Care Coordinator   [facility]  E-mail [email or web address]   [phone]

## 2022-02-10 NOTE — PHARMACY-ADMISSION MEDICATION HISTORY
Pharmacy Medication History  Admission medication history interview status for the 2/10/2022  admission is complete. See EPIC admission navigator for prior to admission medications     Location of Interview: Patient room  Medication history sources: Patient, Patient's family/friend (wife), Surescripts and Care Everywhere    Significant changes made to the medication list:  Changed: Metformin 1000 mg BID to 500 mg BID    In the past week, patient estimated taking medication this percent of the time: 50-90% due to other. Patient was holding other PTA meds until he saw his primary doctor on 2/11/22.     Additional medication history information:   It is unclear why the patient decreased his metformin dose. He did state he was watching his blood sugars to determine if he was needing to take it. Please review with patient while admitted.     Medication reconciliation completed by provider prior to medication history? No    Time spent in this activity: 10 mins    Prior to Admission medications    Medication Sig Last Dose Taking? Auth Provider   acetaminophen (TYLENOL) 325 MG tablet Take 2 tablets (650 mg) by mouth every 6 hours as needed for mild pain or other (and adjunct with moderate or severe pain or per patient request)  Yes Manolo Olivera MD   amLODIPine (NORVASC) 5 MG tablet TAKE 1 TABLET EVERY DAY Past Week at Unknown time Yes Gavin Sevilla MD   atorvastatin (LIPITOR) 40 MG tablet TAKE 1 TABLET EVERY DAY Past Week at Unknown time Yes Markel Kauffman MD   ferrous sulfate (FEROSUL) 325 (65 Fe) MG tablet Take 1 tablet (325 mg) by mouth daily (with breakfast) 2/9/2022 at am Yes Manolo Olivera MD   glimepiride (AMARYL) 2 MG tablet TAKE 1 TABLET EVERY MORNING BEFORE BREAKFAST (NEED 6 MONTH LAB TO CHECK A1C AND OFFICE VISIT BEFORE MORE REFILLS) 2/9/2022 at am Yes Gavin Sevilla MD   losartan (COZAAR) 100 MG tablet Take 1 tablet (100 mg) by mouth daily Past Week at Unknown time Yes  Markel Kauffman MD   metFORMIN (GLUCOPHAGE) 1000 MG tablet Take 1 tablet (1,000 mg) by mouth 2 times daily (with meals)  Patient taking differently: Take 500 mg by mouth 2 times daily (with meals)  2/9/2022 at am Yes Markel Kauffman MD   nitroGLYcerin (NITROSTAT) 0.4 MG sublingual tablet Place 0.4 mg under the tongue as needed for chest pain   Yes Reported, Patient   pantoprazole (PROTONIX) 40 MG EC tablet Take 1 tablet (40 mg) by mouth 2 times daily (before meals) 2/9/2022 at pm Yes Manolo Olivera MD   sildenafil (VIAGRA) 100 MG tablet Take 1 tablet (100 mg) by mouth daily as needed (erectile dysfunction)  Yes Markel Kauffman MD   vitamin C (ASCORBIC ACID) 250 MG tablet Take 2 tablets (500 mg) by mouth daily 2/9/2022 at am Yes Manolo Olivera MD   ACCU-CHEK RYAN PLUS test strip USE TO TEST BLOOD SUGAR TWICE DAILY   Brenna Carrero MD   blood glucose monitoring (ACCU-CHEK MULTICLIX) lancets by In Vitro route 2 times daily   Brenna Carrero MD       The information provided in this note is only as accurate as the sources available at the time of update(s)

## 2022-02-10 NOTE — CONSULTS
[facility]  Colon and Rectal Surgery Consult Note  Name: Jakub Robles    MRN: [id number]  YOB: 1950    Age: 71 year old  Date of admission: 2/10/2022  Primary care provider: Markel Kauffman     Requesting Physician:  Dr. Umana  Reason for consult:  Recurrent diverticular bleeding           History of Present Illness:   Jakub Robles is a 71 year old male with a history of type 2 diabetes, hepatitis-C, hyperlipidemia, hypertension, coronary artery disease (s/p cardiac stent x2), AAA, anemia, seen at the request of Dr. Umana, who presents with rectal bleeding.    Patient was recently admitted from 2/3/22 to 2/4/22 and then again from 2/4/22-2/8/22 for acute blood loss anemia secondary to rectal bleeding.  When he initially presented, gastroenterology was consulted and an upper endoscopy was done on 2/3/22 which showed duodenitis with a few localized erosions in the duodenal bulb, very friable and small amount of oozing with passage of the scope.  But no blood was seen downstream in the duodenum.  He then had a colonoscopy on 2/4/22 which showed a few small diverticula in the sigmoid colon, large amount of old liquid blood was suctioned from the left colon.  No fresh blood or active bleeding was seen.  It was felt that patient likely had a sigmoid diverticular bleed. His hemoglobin dropped as low as 5.7 and he received a total of 4 units of blood.  Repeat upper endoscopy on 2/6 showed duodenitis but no evidence of active bleeding.  His hemoglobin stabilized and the bleeding stopped so he was discharged on 2/8/22 with plans for close outpatient follow-up with a PillCam study.  Aspirin was put on hold at the onset of bleeding last week and continues to be on hold now.    He returns to the ER early today for recurrent rectal bleeding.  He states he is doing well 2 days ago upon discharge from the hospital, but then on 2/9 (yesterday) he had several small stools that were nonbloody. He  believes this aggravated the bleeding because he woke up early this morning with diffuse abdominal discomfort and gurgling.  He went to the bathroom and passed a large amount of maroon-colored stool with associated lightheadedness.  He then came to the ER immediately.  In the ER, patient afebrile, blood pressure 110/69, pulse 88. Lab work significant for hemoglobin 7.3, albumin 3. He was given 1 unit of blood.  CTA showed no evidence of ongoing GI bleeding.  He had a repeat flex sig today which found large volume of clotted blood in the sigmoid and descending colon.  No active bleeding or fresh blood seen.  A tagged RBC scan was recommended as well as CRS consultation for consideration of sigmoidectomy given source cannot be localized.    Patient denies abdominal pain, nausea, or vomiting currently.  He has not seen any further blood since this morning.  Hemoglobin 8.    Colonoscopy History:  2/4/22    Surgical History: No previous abdominal surgery            Past Medical History:     Past Medical History:   Diagnosis Date     AAA (abdominal aortic aneurysm) (H)      CAD (coronary artery disease)      Diabetes mellitus (H)      Diverticulosis of large intestine      Hepatitis C 2012     Hyperlipidemia      Hypertension      PUD (peptic ulcer disease)              Past Surgical History:     Past Surgical History:   Procedure Laterality Date     CARDIAC SURGERY  2016    stents x 2     COLONOSCOPY  2021     COLONOSCOPY N/A 2/4/2022    Procedure: COLONOSCOPY;  Surgeon: Cookie Staley MD;  Location: [facility]     ESOPHAGOSCOPY, GASTROSCOPY, DUODENOSCOPY (EGD), COMBINED N/A 2/3/2022    Procedure: ESOPHAGOGASTRODUODENOSCOPY (EGD);  Surgeon: Cookie Staley MD;  Location: [facility]     ESOPHAGOSCOPY, GASTROSCOPY, DUODENOSCOPY (EGD), COMBINED N/A 2/6/2022    Procedure: ESOPHAGOGASTRODUODENOSCOPY (EGD);  Surgeon: Ish Ambriz MD;  Location:  GI     HEAD & NECK SURGERY      dental implants     implant[    "   dental     SINUS SURGERY                 Social History:     Social History     Tobacco Use     Smoking status: Former Smoker     Packs/day: 0.00     Years: 0.00     Pack years: 0.00     Types: Cigars     Start date: 1968     Quit date: 2001     Years since quittin.1     Smokeless tobacco: Never Used     Tobacco comment: I still vape   Substance Use Topics     Alcohol use: Yes     Comment: couple glasses wine/day             Family History:     Family History   Problem Relation Age of Onset     Arthritis Mother      Cancer Mother 83        pancreatic cancer     Circulatory Father         AAA     Cancer Brother         unknown primary     Diabetes Son         type 1 with neuropathy             Allergies:   No Known Allergies          Medications:       insulin aspart  1-12 Units Subcutaneous Q4H     pantoprazole (PROTONIX) IV  40 mg Intravenous BID     sodium chloride (PF)  3 mL Intracatheter Q8H     sodium chloride (PF)  3 mL Intracatheter Q8H             Review of Systems:   A comprehensive greater than 10 system review of systems was carried out.  Pertinent positives and negatives are noted above.  Otherwise negative for contributory info.            Physical Exam:     Blood pressure (!) 152/81, pulse 78, temperature 98.1  F (36.7  C), resp. rate 20, height 1.753 m (5' 9\"), weight 87.5 kg (193 lb), SpO2 (!) 80 %.    Intake/Output Summary (Last 24 hours) at 2/10/2022 1553  Last data filed at 2/10/2022 0630  Gross per 24 hour   Intake 300 ml   Output --   Net 300 ml     EXAM:  GEN: Awake alert and oriented, appears stated age  PULM: Non-labored breathing with normal respiratory effort  CVS: reg rate and rhythm, no peripheral edema  ABD: Soft, non tender, non distended. No rebound or guarding.  No peritoneal signs.   RECTAL: Rectal exam was deferred.  NEURO: CN II-XII grossly intact  MSK: extremeties with no clubbing, cyanosis or edema; able to ambulate  PSYCH: responsive, alert, cooperative; " oriented x3; appropriate mood and affect  EXT/SKIN: inspection reveals no rashes, lesions or ulcers, normal coloring         Data Reviewed:     Results for orders placed or performed during the hospital encounter of 02/10/22   CTA Abdomen Pelvis with Contrast    Narrative    EXAM: CTA ABDOMEN PELVIS WITH CONTRAST  LOCATION: [facility]  DATE/TIME: 2/10/2022 4:15 AM    INDICATION: GI bleed  COMPARISON: CT from 02/03/2022.  TECHNIQUE: CT angiogram abdomen pelvis during arterial phase of injection of IV contrast. 2D and 3D MIP reconstructions were performed by the CT technologist. Dose reduction techniques were used.  CONTRAST: 119mL Isovue 370.    FINDINGS:  ANGIOGRAM ABDOMEN/PELVIS: The noncontrast images demonstrate moderate to severe aortoiliac atherosclerotic change with a 3.2 cm infrarenal abdominal aortic aneurysm. Following the administration of IV contrast, the abdominal aorta is patent. The celiac   artery is narrowed proximally by extrinsic compression from the median arcuate ligament of the diaphragm, though patent. The superior mesenteric artery is patent. The renal and inferior mesenteric arteries are patent. There is no active intraluminal   arterial phase extravasation of contrast within the GI tract. There is no progressive intraluminal pooling of contrast in the venous phase images.    LOWER CHEST: A few tree-in-bud nodular infiltrates in the left lung base.    HEPATOBILIARY: Normal.    PANCREAS: Normal.    SPLEEN: Normal.    ADRENAL GLANDS: Normal.    KIDNEYS/BLADDER: Normal.    BOWEL: No mechanical bowel obstruction. No free air. No appendicitis.    LYMPH NODES: Multiple subcentimeter short axis lymph nodes throughout the central mesentery.    PELVIC ORGANS: Normal.    MUSCULOSKELETAL: Lumbar spine degenerative disc height loss.      Impression    IMPRESSION:  1.  No CT evidence of ongoing GI bleed.    2.  Mild aneurysmal dilatation of the infrarenal abdominal aorta.    3.   Mild tree-in-bud nodular infiltrates in the left lung base consistent with infectious or inflammatory lower airways process.   NM GI Bleed    Narrative    EXAM: NM GI BLEED  LOCATION: [facility]  DATE/TIME: 2/10/2022 2:06 PM    INDICATION: GI bleed. Melanoma.  COMPARISON: CT of the abdomen pelvis dated 02/10/2022.  TECHNIQUE: 27.0 mCi technetium-99m, in vitro tagged RBCs, IV. Dynamic anterior planar imaging of the abdomen for 65 minutes.    FINDINGS: Normal background activity without scintigraphic evidence of gastrointestinal bleeding during the imaging period.      Impression    IMPRESSION:    Negative for gastrointestinal hemorrhage.       Recent Labs   Lab 02/10/22  1210 02/10/22  0740 02/10/22  0408 02/07/22  1250 02/07/22  0715 02/05/22  1145 02/05/22  1040   WBC  --   --  7.5  --  9.7  --  13.7*   HGB 8.0* 7.1* 7.3*   < > 8.7*  8.7*   < > 7.0*  7.0*  7.0*   HCT  --   --  22.7*  --  27.2*  --  21.0*   MCV  --   --  92  --  90  --  91   PLT  --   --  240  --  162  --  144*    < > = values in this interval not displayed.     Recent Labs   Lab 02/10/22  1210 02/10/22  0830 02/10/22  0408 02/05/22  1504 02/05/22  1040 02/05/22  0309 02/04/22  1948   NA  --   --  137  --  137  --  136   POTASSIUM  --   --  4.1  --  3.8  --  3.8   CHLORIDE  --   --  106  --  109  --  107   CO2  --   --  25  --  24  --  26   ANIONGAP  --   --  6  --  4  --  3   * 181* 253*   < > 191*   < > 248*   BUN  --   --  22  --  14  --  12   CR  --   --  1.00  --  0.89  --  0.84   GFRESTIMATED  --   --  81  --  >90  --  >90   VALENTIN  --   --  8.4*  --  7.4*  --  7.7*   PROTTOTAL  --   --  6.4*  --   --   --  5.4*   ALBUMIN  --   --  3.0*  --   --   --  2.9*   BILITOTAL  --   --  0.2  --   --   --  0.2   ALKPHOS  --   --  40  --   --   --  36*   AST  --   --  11  --   --   --  12   ALT  --   --  15  --   --   --  17    < > = values in this interval not displayed.     Recent Labs   Lab 02/10/22  5802  02/08/22  1529 02/04/22  1948   INR 1.01 1.25* 1.29*         Assessment and Plan:   Jakub Robles is a 71 year old male with a history of type 2 diabetes, hepatitis-C, hyperlipidemia, hypertension, coronary artery disease (s/p cardiac stent x2), AAA, anemia, who presents with recurrent rectal bleeding thought to be sigmoid diverticular bleed.  No clear source of bleeding after EGD x2, colonoscopy, CTA and tagged RBC scan.  No further bleeding since this morning.  Hgb 8 after blood transfusion.  Currently table.  It is not recommended to proceed with colon resection at this time as there is no clear source of bleeding.  Recommend repeat tagged RBC scan if recurrent bleeding.  Could also confirm bleeding with a repeat CTA.  We will continue to follow.  Monitor hemoglobin, transfuse for hgb <7.    Plan:  1. Admit to hospitalist  2. Surgery: No emergent surgery indicated.  3. Diet: Clear liquids ok from our perspective  4. IV Fluids: Per hospitalist  5. Antibiotics:  Not indicated from our perpective.  6. Medications: Continue home meds  7. I&O s:  strict I&O s  8. Labs:   - Reviewed  - Ordered: none   9. Imaging:   - Dr. Cherry and myself have personally viewed: CTA, tagged RBC scan  - Ordered:  None  10. Activity: ambulate as tolerated, encourage OOB  11. DVT prophylaxis: SCD s  12. This plan has been discussed with Dr. Cherry    Patient specific identified risk factors considered as part of today s evaluation include: DM II, CAD s/p stents x2.     Additional history obtained from patient and chart.  Time spent on consultation: 35 minutes, greater than 50 percent of the total encounter time is spent in counseling and/or coordination of care.          Livier Callahan PA-C  [facility]  Phone:  [phone]

## 2022-02-10 NOTE — PROGRESS NOTES
RECEIVING UNIT ED HANDOFF REVIEW    ED Nurse Handoff Report was reviewed by: Amber Garrett on February 10, 2022 at 8:36 AM

## 2022-02-10 NOTE — PROGRESS NOTES
Tap water enema was given while patient laid on bed. Patient walked into bathroom and sat on toilet and emptied out bowels/tap water. Toilet was full of red colored water and some particles.

## 2022-02-10 NOTE — PLAN OF CARE
Inpatient. AOX4. VSS on room air, vitals Q8h. Up in room independently, will call appropriately. NPO, PIV infusing fluids @ 75mL/hr. BG checks Q4h with NPO but patient is also diabetic. BG checks 181 and 153 this shift. [facility] following and came to see patient this AM. Tap water enema given, flexible sigmoidoscopy ordered and done. Red tag study ordered nuc med imaging, patient went there directly after the sigmoidoscopy. This will take about 1.5 hours per staff downstairs who communicated to us up here. Hgb checks Q6h. Last hgb check was 8.0 at 1200. Discharge pending. Wife (Esmer) currently in patient's room waiting.

## 2022-02-10 NOTE — H&P
[facility]    History and Physical  Hospitalist       Date of Admission:  2/10/2022  Date of Service (when I saw the patient): 02/10/22    ASSESSMENT  Jakub Robles is a markedly pleasant 71 year old gentleman with past medical history that is most notable for CAD and Type 2 Diabetes mellitus, who was recently admitted here for GI bleeding causing acute blood loss anemia from 2/3/22 to 2/4/22, and re-admitted from 2/4/22 to 2/8/22 for recurrent bleeding, and now returns for recurrent hematochezia suspected due to acute diverticular bleeding.    PLAN    Suspected recurrent acute sigmoid divertrcular bleeding causing acute blood loss anemia: He initially presented for nausea, vomiting and GI bleeding and [facility] was consulted. EGD on 2/3/2022 showed duodenitis with a few localized erosions in the duodenal bulb, very friable and small amount of oozing with passage of the scope no blood was seen downstream in the duodenum. Subsequent colonoscopy on 2/4/2022 showed few small-mouthed diverticula in the sigmoid colon, large amount of old, liquid blood suctioned from the left colon.  No fresh blood or active bleeding seen.  It was felt that patient likely had a sigmoid diverticular bleed. His HGB dropped as low as 5.7 and he received a total of 4 units packed red cell transfusion. Repeat EGD on 2/6 was essentially unchanged: it did show duodenitis but no evidence of active bleeding. Hemoglobin stabilized around 8.5-9, and his bleeding stopped, and he was discharged on 2/8/2022 with plans for close outpatient follow up and pill came study. Now, unfortunately he returns with recurrent hematochezia at home. /69. HGB has dropped to 7.3 this AM. CTA was done emergently on arrival in the ED and negative for ongoing bleeding. Overall, we suspect recurrent acute episodic diverticular bleeding.    -- Inpatient. NPO. [facility] consulted    -- Repeat HGB after transfusion completed and q 4 hours today     --  ml/hour IVF and prn anti-emetics ordered. Outpatient PPI will be IV BID for now    Recent Labs   Lab Test 02/10/22  0408 02/08/22  0601 02/07/22  2351   HGB 7.3* 8.9* 8.5*       Recent aspiration pneumonitis and transient hypoxia: On his previous initial presentation in the ED he was witnessed to aspirate his emesis and briefly required 13 L Oxymizer. Antibiotics were started. CXR showed no infiltrates and he completed a course of antibiotics on 2/6, and was weaned off oxygen. Tonight's CTA incidentally shows signs of pulmonary infiltrates that are likely related to this previous aspiration event.    -- Noted    Coronary artery disease s/p PCI in 2016  Hypertension  Hyperlipidemia  -Prior to admission aspirin was held at onset of bleeding last week and continues to be held now  -resume prior to admission amlodipine and losartan once he has been medically stabilized    Type 2 Diabetes mellitus: Recent Labs   Lab Test 02/04/22  0547   A1C 7.4*   . On Glimepiride and Metformin as an outpatient.    -- ISS insulin while hospitalized. Hold oral anti-diabetic medications. Resume at discharge    Rule Out COVID-19 infection  This patient was evaluated during a global COVID-19 pandemic, which necessitated consideration that the patient might be at risk for infection with the SARS-CoV-2 virus that causes COVID-19. Applicable protocols for evaluation were followed during the patient's care.   -negative COVID-19 PCR test result  -no current indication for precautions    Chief Complaint    Hematochezia    History is obtained from the patient, his wife at the bedside, and the ED physician whom I have spoken with    History of Present Illness   Jakub Robles is a markedly pleasant 71 year old gentleman who presents with sudden onset maroon stool this morning. He says he was just released from the hospital on 2/8/22, and at that time had been feeling better. The next day he had several small poorly formed stools,  "which were non-bloody. He slowly advanced his diet as well. Last night, as he was going to bed he says he felt well overall, but his wife thought he looked pale. They checked his blood pressure and it was low at 100 systolic. He then went to sleep, but woke up around 3 AM this morning with diffuse abdominal gurgling and dull discomfort; he then defecated a large amount of maroon stool. This was associated with light headedness. They came back immediately to the ED. He says these symptoms now seem to have resolved, and he otherwise denies fever or nausea tonight, or any cough or dyspnea, or any other acute complaints.    In the ED, Blood pressure 110/69, pulse 88, temperature 98.1  F (36.7  C), temperature source Oral, resp. rate 20, height 1.753 m (5' 9\"), weight 87.5 kg (193 lb), SpO2 98 %.    CBC and CMP were notable for HGB 7.3, Ca 8.4, alb 3.0, tprot 6.4, glucose 253, otherwise were within the normal reference range. COVID PCR was negative. EKG showed NSR without ST segment elevations.    One unit packed red cell transfusion jackson been ordered in the ED.    Recent Results (from the past 24 hour(s))   CTA Abdomen Pelvis with Contrast    Narrative    EXAM: CTA ABDOMEN PELVIS WITH CONTRAST  LOCATION: [facility]  DATE/TIME: 2/10/2022 4:15 AM    INDICATION: GI bleed  COMPARISON: CT from 02/03/2022.  TECHNIQUE: CT angiogram abdomen pelvis during arterial phase of injection of IV contrast. 2D and 3D MIP reconstructions were performed by the CT technologist. Dose reduction techniques were used.  CONTRAST: 119mL Isovue 370.    FINDINGS:  ANGIOGRAM ABDOMEN/PELVIS: The noncontrast images demonstrate moderate to severe aortoiliac atherosclerotic change with a 3.2 cm infrarenal abdominal aortic aneurysm. Following the administration of IV contrast, the abdominal aorta is patent. The celiac   artery is narrowed proximally by extrinsic compression from the median arcuate ligament of the diaphragm, " "though patent. The superior mesenteric artery is patent. The renal and inferior mesenteric arteries are patent. There is no active intraluminal   arterial phase extravasation of contrast within the GI tract. There is no progressive intraluminal pooling of contrast in the venous phase images.    LOWER CHEST: A few tree-in-bud nodular infiltrates in the left lung base.    HEPATOBILIARY: Normal.    PANCREAS: Normal.    SPLEEN: Normal.    ADRENAL GLANDS: Normal.    KIDNEYS/BLADDER: Normal.    BOWEL: No mechanical bowel obstruction. No free air. No appendicitis.    LYMPH NODES: Multiple subcentimeter short axis lymph nodes throughout the central mesentery.    PELVIC ORGANS: Normal.    MUSCULOSKELETAL: Lumbar spine degenerative disc height loss.      Impression    IMPRESSION:  1.  No CT evidence of ongoing GI bleed.    2.  Mild aneurysmal dilatation of the infrarenal abdominal aorta.    3.  Mild tree-in-bud nodular infiltrates in the left lung base consistent with infectious or inflammatory lower airways process.     PHYSICAL EXAM  Blood pressure 110/69, pulse 88, temperature 98.1  F (36.7  C), temperature source Oral, resp. rate 20, height 1.753 m (5' 9\"), weight 87.5 kg (193 lb), SpO2 98 %.  Constitutional: Alert and oriented to person, place and time; no apparent distress  HEENT: normocephalic moist mucus membranes  Respiratory: lungs clear to auscultation bilaterally  Cardiovascular: regular S1 S2  GI: abdomen soft non tender mildly distended bowel sounds positive and loud  Lymph/Hematologic: pale, no cervical lymphadenopathy  Skin: no rash, good turgor  Musculoskeletal: no clubbing, cyanosis or edema  Neurologic: extra-ocular muscles intact; moves all four extremities  Psychiatric: appropriate affect, insight and judgment     DVT Prophylaxis: Pneumatic Compression Devices  Code Status: Full Code    Disposition: Expected discharge in 2-4 days    Osvaldo Al MD    Past Medical History    I have reviewed this " patient's medical history and updated it with pertinent information if needed.   Past Medical History:   Diagnosis Date     AAA (abdominal aortic aneurysm) (H)      CAD (coronary artery disease)      Diabetes mellitus (H)      Diverticulosis of large intestine      Hepatitis C 2012     Hyperlipidemia      Hypertension      PUD (peptic ulcer disease)        Past Surgical History   I have reviewed this patient's surgical history and updated it with pertinent information if needed.  Past Surgical History:   Procedure Laterality Date     CARDIAC SURGERY  2016    stents x 2     COLONOSCOPY  2021     COLONOSCOPY N/A 2/4/2022    Procedure: COLONOSCOPY;  Surgeon: Cookie Staley MD;  Location:  GI     ESOPHAGOSCOPY, GASTROSCOPY, DUODENOSCOPY (EGD), COMBINED N/A 2/3/2022    Procedure: ESOPHAGOGASTRODUODENOSCOPY (EGD);  Surgeon: Cookie Staley MD;  Location: [facility]     ESOPHAGOSCOPY, GASTROSCOPY, DUODENOSCOPY (EGD), COMBINED N/A 2/6/2022    Procedure: ESOPHAGOGASTRODUODENOSCOPY (EGD);  Surgeon: Ish Ambriz MD;  Location:  GI     HEAD & NECK SURGERY      dental implants     implant[      dental     SINUS SURGERY  1998       Prior to Admission Medications   Prior to Admission Medications   Prescriptions Last Dose Informant Patient Reported? Taking?   ACCU-CHEK RYAN PLUS test strip  Self No No   Sig: USE TO TEST BLOOD SUGAR TWICE DAILY   NITROGLYCERIN SL  Self Yes No   Sig: Place 0.4 mg under the tongue as needed for chest pain   acetaminophen (TYLENOL) 325 MG tablet   No No   Sig: Take 2 tablets (650 mg) by mouth every 6 hours as needed for mild pain or other (and adjunct with moderate or severe pain or per patient request)   amLODIPine (NORVASC) 5 MG tablet  Self No No   Sig: TAKE 1 TABLET EVERY DAY   atorvastatin (LIPITOR) 40 MG tablet  Self No No   Sig: TAKE 1 TABLET EVERY DAY   blood glucose monitoring (ACCU-CHEK MULTICLIX) lancets  Self No No   Sig: by In Vitro route 2 times daily   ferrous  sulfate (FEROSUL) 325 (65 Fe) MG tablet   No No   Sig: Take 1 tablet (325 mg) by mouth daily (with breakfast)   glimepiride (AMARYL) 2 MG tablet  Self No No   Sig: TAKE 1 TABLET EVERY MORNING BEFORE BREAKFAST (NEED 6 MONTH LAB TO CHECK A1C AND OFFICE VISIT BEFORE MORE REFILLS)   losartan (COZAAR) 100 MG tablet  Self No No   Sig: Take 1 tablet (100 mg) by mouth daily   metFORMIN (GLUCOPHAGE) 1000 MG tablet  Self No No   Sig: Take 1 tablet (1,000 mg) by mouth 2 times daily (with meals)   omega 3 1000 MG CAPS  Self No No   Sig: Take 1 g by mouth daily   pantoprazole (PROTONIX) 40 MG EC tablet   No No   Sig: Take 1 tablet (40 mg) by mouth 2 times daily (before meals)   sildenafil (VIAGRA) 100 MG tablet  Self No No   Sig: Take 1 tablet (100 mg) by mouth daily as needed (erectile dysfunction)   vitamin C (ASCORBIC ACID) 250 MG tablet   No No   Sig: Take 2 tablets (500 mg) by mouth daily      Facility-Administered Medications: None     Allergies   No Known Allergies    Social History   I have reviewed this patient's social history and updated it with pertinent information if needed. Jakub ROY Margaret  reports that he has been smoking cigars. He started smoking about 54 years ago. He has been smoking about 0.00 packs per day for the past 0.00 years. He has never used smokeless tobacco. He reports current alcohol use. He reports that he does not use drugs.    Family History   Family history assessed and, except as above, is non-contributory.    Family History   Problem Relation Age of Onset     Arthritis Mother      Cancer Mother 83        pancreatic cancer     Circulatory Father         AAA     Cancer Brother         unknown primary     Diabetes Son         type 1 with neuropathy       Review of Systems   The 10 point Review of Systems is negative other than noted in the HPI or here.     Primary Care Physician   Markel Kauffman    Data   Labs Ordered and Resulted from Time of ED Arrival to Time of ED Departure   COMPREHENSIVE  METABOLIC PANEL - Abnormal       Result Value    Sodium 137      Potassium 4.1      Chloride 106      Carbon Dioxide (CO2) 25      Anion Gap 6      Urea Nitrogen 22      Creatinine 1.00      Calcium 8.4 (*)     Glucose 253 (*)     Alkaline Phosphatase 40      AST 11      ALT 15      Protein Total 6.4 (*)     Albumin 3.0 (*)     Bilirubin Total 0.2      GFR Estimate 81     CBC WITH PLATELETS AND DIFFERENTIAL - Abnormal    WBC Count 7.5      RBC Count 2.48 (*)     Hemoglobin 7.3 (*)     Hematocrit 22.7 (*)     MCV 92      MCH 29.4      MCHC 32.2      RDW 14.5      Platelet Count 240      % Neutrophils 66      % Lymphocytes 13      % Monocytes 15      % Eosinophils 6      % Basophils 0      % Immature Granulocytes 0      NRBCs per 100 WBC 0      Absolute Neutrophils 4.9      Absolute Lymphocytes 1.0      Absolute Monocytes 1.1      Absolute Eosinophils 0.4      Absolute Basophils 0.0      Absolute Immature Granulocytes 0.0      Absolute NRBCs 0.0     INR - Normal    INR 1.01     COVID-19 VIRUS (CORONAVIRUS) BY PCR - Normal    SARS CoV2 PCR Negative     TYPE AND SCREEN, ADULT   PREPARE RED BLOOD CELLS (UNIT)   ABO/RH TYPE AND SCREEN       Data reviewed today:  I personally reviewed the EKG tracing showing NSR.

## 2022-02-10 NOTE — ED TRIAGE NOTES
C/o rectal bleeding again tonight, feels weak and SOB. Patient reports he got discharged on Tuesday for same problem.

## 2022-02-10 NOTE — CONSULTS
GASTROENTEROLOGY CONSULTATION      Jakub Robles  [address] [facility] [address]  71 year old male     Admission Date/Time: 2/10/2022  Primary Care Provider: Markel Kauffman     We were asked to see the patient in consultation by Dr. Al for evaluation of rectal bleeding.    ASSESSMENT/PLAN:    #1 Recurrent rectal bleeding, sigmoid diverticular bleed suspected status post EGD x2 and recent colonoscopy, and negative CTA  #2  Acute blood loss anemia    I discussed with Jakub about suspected diverticular bleed and reviewed usual course and prognosis for this condition.  With recurrent nature, lack of localization despite 2 endoscopy, 1 colonoscopy and recent CT angios scan, surgical options may need to be considered.  I will plan for a flexible sigmoidoscopy today.  We discussed that this may or may not pinpoint a source if there is no active bleeding but is worth a try.      I also recommend colorectal surgery consultation to review potential sigmoidectomy.     If sigmoidoscopy negative, recommed tagged red cell scan for localization although we discussed that this may only be positive if there is ongoing active bleeding (and there is evidence that this may be inferior to CTA for suspected lower GI bleed/colonic bleed).    Jenny Umana MD   [facility]  [phone]    Total time: 60 minutes.   ________________________________________________________________________        HPI:  Jakub Robles is a 71 year old male who presents with recurrent rectal bleeding.  He woke up early morning with passage of large amount of blood prompting to come to the ER.  He denies any abdominal pain.  Denies any fevers chills nausea vomiting.  This is his third hospitalization within last 10 days for rectal bleeding.  He has recently had 2 upper endoscopies with mild adenitis and no clear source of bleeding and also colonoscopy showing sigmoid diverticulosis with blood products noted in the  left colon primarily with no blood products in ascending colon and TI on the colonoscopy exam.  He also had a colonoscopy outpatient screening exam in August where multiple polyps were seen and again sigmoid diverticulosis noted.  He is not on any anticoagulation therapy.    CT angio scan was completed in the ER which did not reveal any active bleeding.     ROS: A comprehensive ten point review of systems was negative aside from those in mentioned in the HPI.      PAST MEDICAL HISTORY:  Past Medical History:   Diagnosis Date     AAA (abdominal aortic aneurysm) (H)      CAD (coronary artery disease)      Diabetes mellitus (H)      Diverticulosis of large intestine      Hepatitis C 2012     Hyperlipidemia      Hypertension      PUD (peptic ulcer disease)      SOCIAL HISTORY:  Social History     Tobacco Use     Smoking status: Light Tobacco Smoker     Packs/day: 0.00     Years: 0.00     Pack years: 0.00     Types: Cigars     Start date: 1968     Last attempt to quit: 2001     Years since quittin.1     Smokeless tobacco: Never Used     Tobacco comment: I still vape   Vaping Use     Vaping Use: Never used   Substance Use Topics     Alcohol use: Yes     Comment: couple glasses wine/day     Drug use: No     FAMILY HISTORY:  Family History   Problem Relation Age of Onset     Arthritis Mother      Cancer Mother 83        pancreatic cancer     Circulatory Father         AAA     Cancer Brother         unknown primary     Diabetes Son         type 1 with neuropathy     ALLERGIES: No Known Allergies  MEDICATIONS:   Prior to Admission medications    Medication Sig Start Date End Date Taking? Authorizing Provider   acetaminophen (TYLENOL) 325 MG tablet Take 2 tablets (650 mg) by mouth every 6 hours as needed for mild pain or other (and adjunct with moderate or severe pain or per patient request) 22  Yes Manolo Olivera MD   amLODIPine (NORVASC) 5 MG tablet TAKE 1 TABLET EVERY DAY 21  Yes Di  "Gavin Riley MD   atorvastatin (LIPITOR) 40 MG tablet TAKE 1 TABLET EVERY DAY 8/1/21  Yes Markel Kauffman MD   ferrous sulfate (FEROSUL) 325 (65 Fe) MG tablet Take 1 tablet (325 mg) by mouth daily (with breakfast) 2/4/22  Yes Manolo Olivera MD   glimepiride (AMARYL) 2 MG tablet TAKE 1 TABLET EVERY MORNING BEFORE BREAKFAST (NEED 6 MONTH LAB TO CHECK A1C AND OFFICE VISIT BEFORE MORE REFILLS) 7/23/21  Yes Gavin Sevilla MD   losartan (COZAAR) 100 MG tablet Take 1 tablet (100 mg) by mouth daily 8/1/21  Yes Markel Kauffman MD   metFORMIN (GLUCOPHAGE) 1000 MG tablet Take 1 tablet (1,000 mg) by mouth 2 times daily (with meals)  Patient taking differently: Take 500 mg by mouth 2 times daily (with meals)  10/18/21  Yes Markel Kauffman MD   nitroGLYcerin (NITROSTAT) 0.4 MG sublingual tablet Place 0.4 mg under the tongue as needed for chest pain    Yes Reported, Patient   pantoprazole (PROTONIX) 40 MG EC tablet Take 1 tablet (40 mg) by mouth 2 times daily (before meals) 2/4/22  Yes Manolo Olivera MD   sildenafil (VIAGRA) 100 MG tablet Take 1 tablet (100 mg) by mouth daily as needed (erectile dysfunction) 7/12/21  Yes Markel Kauffman MD   vitamin C (ASCORBIC ACID) 250 MG tablet Take 2 tablets (500 mg) by mouth daily 2/4/22  Yes Manolo Olivera MD   ACCU-CHEK RYAN PLUS test strip USE TO TEST BLOOD SUGAR TWICE DAILY 7/14/20   Brenna Carrero MD   blood glucose monitoring (ACCU-CHEK MULTICLIX) lancets by In Vitro route 2 times daily 11/17/16   Brenna Carrero MD     PHYSICAL EXAM:   /62 (BP Location: Left arm)   Pulse 71   Temp 98.1  F (36.7  C)   Resp 14   Ht 1.753 m (5' 9\")   Wt 87.5 kg (193 lb)   SpO2 99%   BMI 28.50 kg/m     GEN: No distress, comfortable.  OTONIEL: + pallor, No icterus, oral mucosa moist.    NECK: No thyromegaly. No mass.    HEART: RRR, S1 S2 normal.   LUNGS: CTA BL  ABD: soft, non-tender, non-distended, no peritoneal signs.  NEURO: No gross " motor deficits.  PSYCH: A O X 3.  EXTREMITIES: No pedal edema.      ADDITIONAL DATA:   I reviewed the patient's new clinical lab test results.   Recent Labs   Lab Test 02/10/22  0740 02/10/22  0408 02/08/22  1529 02/08/22  0601 02/07/22  1250 02/07/22  0715 02/05/22  1145 02/05/22  1040 02/05/22  0334 02/04/22 1948   WBC  --  7.5  --   --   --  9.7  --  13.7*  --  14.4*   HGB 7.1* 7.3*  --  8.9*   < > 8.7*  8.7*   < > 7.0*  7.0*  7.0*   < > 6.2*   MCV  --  92  --   --   --  90  --  91  --  94   PLT  --  240  --   --   --  162  --  144*  --  190   INR  --  1.01 1.25*  --   --   --   --   --   --  1.29*    < > = values in this interval not displayed.     Recent Labs   Lab Test 02/10/22  0830 02/10/22  0408 02/08/22  0955 02/05/22  1504 02/05/22  1040 02/05/22  0309 02/04/22 1948   NA  --  137  --   --  137  --  136   POTASSIUM  --  4.1  --   --  3.8  --  3.8   CHLORIDE  --  106  --   --  109  --  107   CO2  --  25  --   --  24  --  26   BUN  --  22  --   --  14  --  12   CR  --  1.00  --   --  0.89  --  0.84   ANIONGAP  --  6  --   --  4  --  3   VALENTIN  --  8.4*  --   --  7.4*  --  7.7*   * 253* 266*   < > 191*   < > 248*    < > = values in this interval not displayed.     Recent Labs   Lab Test 02/10/22  0408 02/04/22  1948 02/03/22  0736 06/24/20  0746 09/06/19  1114 08/31/18  0836 08/18/17  0758 08/18/17  0757   ALBUMIN 3.0* 2.9* 3.4   < > 4.5  --    < >  --    BILITOTAL 0.2 0.2 0.3   < > 0.5  --    < >  --    ALT 15 17 21   < > 24  --    < >  --    AST 11 12 11   < > 15  --    < >  --    PROTEIN  --   --   --   --  Negative Negative  --  Negative   LIPASE  --   --  85  --   --   --   --   --     < > = values in this interval not displayed.        I reviewed the patient's new imaging results.

## 2022-02-10 NOTE — PROGRESS NOTES
[facility]    Medicine Progress Note - Hospitalist Service        Date of Admission:  2/10/2022  3:46 AM    Assessment & Plan:   Jakub Robles is a markedly pleasant 71 year old gentleman with past medical history that is most notable for CAD and Type 2 Diabetes mellitus, who was recently admitted here for GI bleeding causing acute blood loss anemia from 2/3/22 to 2/4/22, and re-admitted from 2/4/22 to 2/8/22 for recurrent bleeding, and now returns for recurrent hematochezia suspected due to acute diverticular bleeding.    Suspected recurrent acute sigmoid divertrcular bleeding causing acute blood loss anemia  -This is his third hospitalization in the last couple of weeks for recurrent GI bleed  -Patient was just discharged by me on 2/8/2022 and now presents with new episode of GI bleed  -Given the nature of the bleed high suspicion for diverticular bleed  -Hemoglobin at the time of discharge was stable around 8.5-now, now hemoglobin is lower again at 7.2  -S/p 1 unit of PRBC  -[facility] reconsulted, flexible sigmoidoscopy fairly unremarkable, discussed with GI will proceed with a tagged RBC scan and also consult colorectal surgery to consider for possible sigmoidectomy  -Monitor hemoglobin every 6 hours  -Transfuse as necessary    Coronary artery disease s/p PCI in 2016  Hypertension  Hyperlipidemia  -Prior to admission aspirin was held at onset of bleeding last week and continues to be held now  -Continue amlodipine and losartan     Type 2 Diabetes mellitus  -PTA on Glimepiride and Metformin as an outpatient.  -Hold oral anti-diabetic medications  -High SS insulin while hospitalized.     Diet: NPO per Anesthesia Guidelines for Procedure/Surgery Except for: Meds     DVT Prophylaxis: Pneumatic Compression Devices   Maguire Catheter: Not present  Code Status: Full Code     Disposition Plan    Expected Discharge: 02/11/2022     Anticipated discharge location:  Awaiting care coordination  "huddle  Delays:        Entered: Calixto Morgan MD 02/10/2022, 1:09 PM        Clinically Significant Risk Factors Present on Admission                 # Diabetes, type II: last A1C 7.4 % (Ref range: 0.0 - 5.6 %)  # Overweight: Estimated body mass index is 28.5 kg/m  as calculated from the following:    Height as of this encounter: 1.753 m (5' 9\").    Weight as of this encounter: 87.5 kg (193 lb).        The patient's care was discussed with the Bedside Nurse and Patient.    Calixto Morgan MD  Hospitalist Service  [facility]  Text Page 7AM-6PM  Securely message with the Vocera Web Console (learn more here)  Text page via [employer] Paging/Directory    ______________________________________________________________________    Interval History   Events noted and discussed with GI. Unable to see him as patient was in procedure and testing for most of the day    Data reviewed today: I reviewed all medications, new labs and imaging results over the last 24 hours. I personally reviewed no images or EKG's today.    Physical Exam   Vital signs:  Temp: 98.1  F (36.7  C) Temp src: Temporal BP: (!) 160/81 Pulse: 84   Resp: (!) 6 SpO2: 98 % O2 Device: None (Room air)   Height: 175.3 cm (5' 9\") Weight: 87.5 kg (193 lb)  Estimated body mass index is 28.5 kg/m  as calculated from the following:    Height as of this encounter: 1.753 m (5' 9\").    Weight as of this encounter: 87.5 kg (193 lb).      Wt Readings from Last 2 Encounters:   02/10/22 87.5 kg (193 lb)   02/08/22 87.6 kg (193 lb 3.2 oz)             Data   Recent Labs   Lab 02/10/22  1210 02/10/22  0830 02/10/22  0740 02/10/22  0408 02/08/22  1529 02/07/22  1032 02/07/22  0715 02/05/22  1145 02/05/22  1040 02/05/22  0309 02/04/22  1948   WBC  --   --   --  7.5  --   --  9.7  --  13.7*  --  14.4*   HGB 8.0*  --  7.1* 7.3*  --    < > 8.7*  8.7*   < > 7.0*  7.0*  7.0*   < > 6.2*   MCV  --   --   --  92  --   --  90  --  91  --  94   PLT  --   --   --  " 240  --   --  162  --  144*  --  190   INR  --   --   --  1.01 1.25*  --   --   --   --   --  1.29*   NA  --   --   --  137  --   --   --   --  137  --  136   POTASSIUM  --   --   --  4.1  --   --   --   --  3.8  --  3.8   CHLORIDE  --   --   --  106  --   --   --   --  109  --  107   CO2  --   --   --  25  --   --   --   --  24  --  26   BUN  --   --   --  22  --   --   --   --  14  --  12   CR  --   --   --  1.00  --   --   --   --  0.89  --  0.84   ANIONGAP  --   --   --  6  --   --   --   --  4  --  3   VALENTIN  --   --   --  8.4*  --   --   --   --  7.4*  --  7.7*   * 181*  --  253*  --    < >  --    < > 191*   < > 248*   ALBUMIN  --   --   --  3.0*  --   --   --   --   --   --  2.9*   PROTTOTAL  --   --   --  6.4*  --   --   --   --   --   --  5.4*   BILITOTAL  --   --   --  0.2  --   --   --   --   --   --  0.2   ALKPHOS  --   --   --  40  --   --   --   --   --   --  36*   ALT  --   --   --  15  --   --   --   --   --   --  17   AST  --   --   --  11  --   --   --   --   --   --  12    < > = values in this interval not displayed.       Recent Results (from the past 24 hour(s))   CTA Abdomen Pelvis with Contrast    Narrative    EXAM: CTA ABDOMEN PELVIS WITH CONTRAST  LOCATION: [facility]  DATE/TIME: 2/10/2022 4:15 AM    INDICATION: GI bleed  COMPARISON: CT from 02/03/2022.  TECHNIQUE: CT angiogram abdomen pelvis during arterial phase of injection of IV contrast. 2D and 3D MIP reconstructions were performed by the CT technologist. Dose reduction techniques were used.  CONTRAST: 119mL Isovue 370.    FINDINGS:  ANGIOGRAM ABDOMEN/PELVIS: The noncontrast images demonstrate moderate to severe aortoiliac atherosclerotic change with a 3.2 cm infrarenal abdominal aortic aneurysm. Following the administration of IV contrast, the abdominal aorta is patent. The celiac   artery is narrowed proximally by extrinsic compression from the median arcuate ligament of the diaphragm, though patent. The  superior mesenteric artery is patent. The renal and inferior mesenteric arteries are patent. There is no active intraluminal   arterial phase extravasation of contrast within the GI tract. There is no progressive intraluminal pooling of contrast in the venous phase images.    LOWER CHEST: A few tree-in-bud nodular infiltrates in the left lung base.    HEPATOBILIARY: Normal.    PANCREAS: Normal.    SPLEEN: Normal.    ADRENAL GLANDS: Normal.    KIDNEYS/BLADDER: Normal.    BOWEL: No mechanical bowel obstruction. No free air. No appendicitis.    LYMPH NODES: Multiple subcentimeter short axis lymph nodes throughout the central mesentery.    PELVIC ORGANS: Normal.    MUSCULOSKELETAL: Lumbar spine degenerative disc height loss.      Impression    IMPRESSION:  1.  No CT evidence of ongoing GI bleed.    2.  Mild aneurysmal dilatation of the infrarenal abdominal aorta.    3.  Mild tree-in-bud nodular infiltrates in the left lung base consistent with infectious or inflammatory lower airways process.     Medications     sodium chloride 75 mL/hr at 02/10/22 1137       insulin aspart  1-12 Units Subcutaneous Q4H     pantoprazole (PROTONIX) IV  40 mg Intravenous BID     sodium chloride (PF)  3 mL Intracatheter Q8H     sodium chloride (PF)  3 mL Intracatheter Q8H

## 2022-02-10 NOTE — ED NOTES
"[facility]  ED Nurse Handoff Report    ED Chief complaint: GI Bleeding      ED Diagnosis:   Final diagnoses:   Gastrointestinal hemorrhage, unspecified gastrointestinal hemorrhage type   Anemia due to blood loss, acute       Code Status: Full Code    Allergies: No Known Allergies    Patient Story: C/o rectal bleeding again tonight, feels weak and SOB. Patient reports he got discharged on Tuesday for same problem  Focused Assessment:  Patient is alert and oriented x 4, calm and cooperative. VS are stable, skin is warm and dry, respirations are even and non-labored on room air. Patient denied chest pain, shortness of breath, dizziness, and nausea.       Treatments and/or interventions provided:   Medications   iopamidol (ISOVUE-370) solution 119 mL (119 mLs Intravenous Given 2/10/22 0421)   Saline (80 mLs Intravenous Given 2/10/22 0421)     Blood administration pending.    Patient's response to treatments and/or interventions: Stable    To be done/followed up on inpatient unit:  Monitor, administer blood products per MD orders.     Does this patient have any cognitive concerns?: None    Activity level - Baseline/Home:  Independent  Activity Level - Current:   Independent    Patient's Preferred language: English   Needed?: No    Isolation: None  Infection: Not Applicable  Patient tested for COVID 19 prior to admission: YES  Bariatric?: No    Vital Signs:   Vitals:    02/10/22 0344 02/10/22 0358   BP: 99/49 110/69   Pulse: 88    Resp: 16 20   Temp: 98.3  F (36.8  C) 98.1  F (36.7  C)   TempSrc: Oral Oral   SpO2: 100% 98%   Weight: 87.5 kg (193 lb) 87.5 kg (193 lb)   Height: 1.753 m (5' 9\") 1.753 m (5' 9\")       Cardiac Rhythm:     Was the PSS-3 completed:   Yes  What interventions are required if any?               Family Comments: Spouse is at the bedside.   OBS brochure/video discussed/provided to patient/family: No              Name of person given brochure if not patient: NA              " Relationship to patient: NA    For the majority of the shift this patient's behavior was Green.   Behavioral interventions performed were  information and reassurance provided.  .    ED NURSE PHONE NUMBER: [phone]

## 2022-02-10 NOTE — ED PROVIDER NOTES
"  History   Chief Complaint:  GI Bleeding       HPI   Jakub Robles is a 71 year old male with recent hospital admission 2/3-2/4 and 2/4-2/8 for duodenitis with hemorrhage who presents with generalized weakness and shortness of breath. He reports that he got up to have a bowel movement and again noticed bright red blood. The patient notes that he felt \"okay\" prior to going to bed. The patient's wife notes that his hemoglobin was \"in the 8s\" prior to hospital discharge 2 days ago. He denies any nausea or abdominal pain. He is still not on blood thinners or taking aspirin.     Review of Systems   Constitutional: Negative for fever.   Respiratory: Positive for shortness of breath.    Cardiovascular: Negative for chest pain.   Gastrointestinal: Positive for blood in stool. Negative for abdominal pain and nausea.   Neurological: Positive for weakness.   All other systems reviewed and are negative.    Allergies:  The patient has no known allergies.     Medications:  Norvasc  Lipitor  Amaryl  Cozaar  Glucophage  Protonix  Viagra  Nitroglycerin    Past Medical History:     Type 2 diabetes  Hepatitis C  Hyperlipidemia  Hypertension  CAD  ED  Acute blood loss anemia  Duodenitis with hemorrhage  AAA    Past Surgical History:    Cardiac stent x2  Colonoscopy x2  EGD combined x2  Dental implants  Sinus surgery    Family History:    Mother: Arthritis, pancreatic cancer  Father: AAA  Brother: cancer  Son: Type 1 diabetes with neuropathy    Social History:  The patient presents to the ED with his wife.    Physical Exam     Patient Vitals for the past 24 hrs:   BP Temp Temp src Pulse Resp SpO2 Height Weight   02/10/22 0500 -- -- -- -- -- 100 % -- --   02/10/22 0358 110/69 98.1  F (36.7  C) Oral -- 20 98 % 1.753 m (5' 9\") 87.5 kg (193 lb)   02/10/22 0344 99/49 98.3  F (36.8  C) Oral 88 16 100 % 1.753 m (5' 9\") 87.5 kg (193 lb)       Physical Exam  General: Appears well-developed and well-nourished.   Head: No signs of trauma. "   CV: Normal rate and regular rhythm.    Resp: Effort normal and breath sounds normal. No respiratory distress.   GI: Soft. There is no tenderness.  No rebound or guarding.  Normal bowel sounds.    MSK: Normal range of motion.   Neuro: The patient is alert and oriented. Speech normal.  Skin: Skin is warm and dry. Pale in color.  Psych: normal mood and affect. behavior is normal.       Emergency Department Course   ECG  ECG obtained at 0356, ECG read at 0406  Normal sinus rhythm. Nonspecific ST abnormality. Abnormal ECG.  Rate 87 bpm. SD interval 156 ms. QRS duration 92 ms. QT/QTc 366/440 ms. P-R-T axes 75 62 49.     Imaging:  CTA Abdomen Pelvis with Contrast   Final Result   IMPRESSION:   1.  No CT evidence of ongoing GI bleed.      2.  Mild aneurysmal dilatation of the infrarenal abdominal aorta.      3.  Mild tree-in-bud nodular infiltrates in the left lung base consistent with infectious or inflammatory lower airways process.        Report per radiology    Laboratory:  Labs Ordered and Resulted from Time of ED Arrival to Time of ED Departure   COMPREHENSIVE METABOLIC PANEL - Abnormal       Result Value    Sodium 137      Potassium 4.1      Chloride 106      Carbon Dioxide (CO2) 25      Anion Gap 6      Urea Nitrogen 22      Creatinine 1.00      Calcium 8.4 (*)     Glucose 253 (*)     Alkaline Phosphatase 40      AST 11      ALT 15      Protein Total 6.4 (*)     Albumin 3.0 (*)     Bilirubin Total 0.2      GFR Estimate 81     CBC WITH PLATELETS AND DIFFERENTIAL - Abnormal    WBC Count 7.5      RBC Count 2.48 (*)     Hemoglobin 7.3 (*)     Hematocrit 22.7 (*)     MCV 92      MCH 29.4      MCHC 32.2      RDW 14.5      Platelet Count 240      % Neutrophils 66      % Lymphocytes 13      % Monocytes 15      % Eosinophils 6      % Basophils 0      % Immature Granulocytes 0      NRBCs per 100 WBC 0      Absolute Neutrophils 4.9      Absolute Lymphocytes 1.0      Absolute Monocytes 1.1      Absolute Eosinophils 0.4       Absolute Basophils 0.0      Absolute Immature Granulocytes 0.0      Absolute NRBCs 0.0     INR - Normal    INR 1.01     COVID-19 VIRUS (CORONAVIRUS) BY PCR - Normal    SARS CoV2 PCR Negative     TYPE AND SCREEN, ADULT    ABO/RH(D) A POS      Antibody Screen Negative      SPECIMEN EXPIRATION DATE [id number]     PREPARE RED BLOOD CELLS (UNIT)    CROSSMATCH Compatible      UNIT ABO/RH A Pos      Unit Number [id number]      Unit Status Issued      Blood Component Type Red Blood Cells      Product Code [id number]      CODING SYSTEM [id number]      UNIT TYPE ISBT 6200      ISSUE DATE AND TIME 20220210052400     PREPARE RED BLOOD CELLS (UNIT)   TRANSFUSE RED BLOOD CELLS (UNIT)   ABO/RH TYPE AND SCREEN        Emergency Department Course:         Reviewed:  I reviewed nursing notes, vitals, past medical history and Care Everywhere    Assessments:  0349 I obtained history and examined the patient as noted above.     0506 I rechecked the patient and explained findings.     Consults:  0502 I spoke with Dr. Al, hospitalist, who accepts the patient.     Disposition:  The patient was admitted to the hospital under the care of Dr. Al.     Impression & Plan     CMS Diagnoses: None    Medical Decision Making:  Jakub Robles is 71-year-old gentleman who presents due to blood in his stool.  He was recently seen for a GI bleed and had required a blood transfusion.  He had been discharged a few days ago and tonight when he woke up he had blood in his stool and felt lightheaded and weak.  Blood work was obtained that did show a drop in his hemoglobin from 8.9-7.3.  Note from prior admission did recommend obtaining a CT angiogram if he had any further bleeding so this was obtained.  This unfortunately did not show any clear source of the bleeding. Given the drop in hemoglobin, concern for possible continued bleeding, I did order a blood transfusion and the patient was admitted to the hospitalist service for continued  monitoring.    Diagnosis:    ICD-10-CM    1. Gastrointestinal hemorrhage, unspecified gastrointestinal hemorrhage type  K92.2    2. Anemia due to blood loss, acute  D62      Scribe Disclosure:  I, J Luis Burnette, am serving as a scribe at 3:48 AM on 2/10/2022 to document services personally performed by Gavin Saxena MD based on my observations and the provider's statements to me.          Gavin Saxena MD  02/10/22 0610

## 2022-02-11 LAB
BLD PROD TYP BPU: NORMAL
BLOOD COMPONENT TYPE: NORMAL
CODING SYSTEM: NORMAL
CROSSMATCH: NORMAL
GLUCOSE BLDC GLUCOMTR-MCNC: 113 MG/DL (ref 70–99)
GLUCOSE BLDC GLUCOMTR-MCNC: 128 MG/DL (ref 70–99)
GLUCOSE BLDC GLUCOMTR-MCNC: 136 MG/DL (ref 70–99)
GLUCOSE BLDC GLUCOMTR-MCNC: 160 MG/DL (ref 70–99)
GLUCOSE BLDC GLUCOMTR-MCNC: 181 MG/DL (ref 70–99)
GLUCOSE BLDC GLUCOMTR-MCNC: 221 MG/DL (ref 70–99)
HBA1C MFR BLD: 5.8 % (ref 0–5.6)
HGB BLD-MCNC: 6.8 G/DL (ref 13.3–17.7)
HGB BLD-MCNC: 7.7 G/DL (ref 13.3–17.7)
HGB BLD-MCNC: 7.8 G/DL (ref 13.3–17.7)
ISSUE DATE AND TIME: NORMAL
UNIT ABO/RH: NORMAL
UNIT NUMBER: NORMAL
UNIT STATUS: NORMAL
UNIT TYPE ISBT: 6200

## 2022-02-11 PROCEDURE — 250N000011 HC RX IP 250 OP 636: Performed by: HOSPITALIST

## 2022-02-11 PROCEDURE — 99233 SBSQ HOSP IP/OBS HIGH 50: CPT | Performed by: INTERNAL MEDICINE

## 2022-02-11 PROCEDURE — 83036 HEMOGLOBIN GLYCOSYLATED A1C: CPT | Performed by: INTERNAL MEDICINE

## 2022-02-11 PROCEDURE — 36415 COLL VENOUS BLD VENIPUNCTURE: CPT | Performed by: INTERNAL MEDICINE

## 2022-02-11 PROCEDURE — P9016 RBC LEUKOCYTES REDUCED: HCPCS | Performed by: INTERNAL MEDICINE

## 2022-02-11 PROCEDURE — 120N000001 HC R&B MED SURG/OB

## 2022-02-11 PROCEDURE — 85018 HEMOGLOBIN: CPT | Performed by: INTERNAL MEDICINE

## 2022-02-11 PROCEDURE — C9113 INJ PANTOPRAZOLE SODIUM, VIA: HCPCS | Performed by: HOSPITALIST

## 2022-02-11 PROCEDURE — 258N000003 HC RX IP 258 OP 636: Performed by: INTERNAL MEDICINE

## 2022-02-11 PROCEDURE — P9016 RBC LEUKOCYTES REDUCED: HCPCS | Performed by: PHYSICIAN ASSISTANT

## 2022-02-11 RX ORDER — POLYETHYLENE GLYCOL 3350 17 G/17G
238 POWDER, FOR SOLUTION ORAL ONCE
Status: COMPLETED | OUTPATIENT
Start: 2022-02-13 | End: 2022-02-13

## 2022-02-11 RX ORDER — NICOTINE POLACRILEX 4 MG
15-30 LOZENGE BUCCAL
Status: DISCONTINUED | OUTPATIENT
Start: 2022-02-11 | End: 2022-02-15 | Stop reason: HOSPADM

## 2022-02-11 RX ORDER — ATORVASTATIN CALCIUM 40 MG/1
40 TABLET, FILM COATED ORAL DAILY
Status: DISCONTINUED | OUTPATIENT
Start: 2022-02-11 | End: 2022-02-15 | Stop reason: HOSPADM

## 2022-02-11 RX ORDER — DEXTROSE MONOHYDRATE 25 G/50ML
25-50 INJECTION, SOLUTION INTRAVENOUS
Status: DISCONTINUED | OUTPATIENT
Start: 2022-02-11 | End: 2022-02-15 | Stop reason: HOSPADM

## 2022-02-11 RX ADMIN — INSULIN ASPART 4 UNITS: 100 INJECTION, SOLUTION INTRAVENOUS; SUBCUTANEOUS at 03:11

## 2022-02-11 RX ADMIN — PANTOPRAZOLE SODIUM 40 MG: 40 INJECTION, POWDER, FOR SOLUTION INTRAVENOUS at 20:18

## 2022-02-11 RX ADMIN — SODIUM CHLORIDE 500 ML: 9 INJECTION, SOLUTION INTRAVENOUS at 04:17

## 2022-02-11 RX ADMIN — PANTOPRAZOLE SODIUM 40 MG: 40 INJECTION, POWDER, FOR SOLUTION INTRAVENOUS at 10:04

## 2022-02-11 ASSESSMENT — ACTIVITIES OF DAILY LIVING (ADL)
ADLS_ACUITY_SCORE: 7

## 2022-02-11 ASSESSMENT — MIFFLIN-ST. JEOR: SCORE: 1604.38

## 2022-02-11 NOTE — PLAN OF CARE
"A+Ox4. IND in room. Pt was feeling SOB, lightheaded, nauseous and vision changed (\"seeing some dots\" in the air), Put pt on 2L oxymask (no NC available) and gave zofran x1, effective for SOB and nausea) paged, received orders for bolus, then HGB came back 5.4. Paged Infused 2 units rbcs. Pt called wife and wife called [facility] who called to put stat CTA abd pelvic per pt's wife, however when I went back in to talk with pt, pt said he does not want the CTA unless he is actively bleeding and at that time, he said he feels fine after the first infusion. Pt is upset that no stat CTA was done, but no orders was on file nor did I receive orders until pt's wife called [facility] to which pt refused. Pt is doing well, no s/s of SOB or lightheaded, and he is \"feeling fine.\" Vitals are stable during infusion and no reactions.     Will continue to monitor.   "

## 2022-02-11 NOTE — PROGRESS NOTES
[facility]  PROGRESS NOTE    February 11, 2022    SUBJECTIVE:  Events overnight reviewed.  Hemoglobin down to 5.4.  He received 2 units of blood.  Repeat Hgb 7.7 this morning.  Patient and wife frustrated that CTA was not done last night.    OBJECTIVE:  Temp:  [97.8  F (36.6  C)-99.3  F (37.4  C)] 98  F (36.7  C)  Pulse:  [63-95] 68  Resp:  [6-24] 17  BP: ()/(46-88) 102/54  SpO2:  [80 %-100 %] 99 %    Intake/Output Summary (Last 24 hours) at 2/11/2022 0942  Last data filed at 2/11/2022 0710  Gross per 24 hour   Intake 650 ml   Output --   Net 650 ml       GENERAL:  Awake, alert, no acute distress, lying in bed.  HEAD: Nomocephalic atraumatic  SCLERA: anicteric  EXTREMITIES: warm and well perfused  ABDOMEN:  Non-distended    LABS:  Lab Results   Component Value Date    WBC 7.5 02/10/2022    WBC 5.8 09/06/2019     Lab Results   Component Value Date    HGB 7.7 02/11/2022    HGB 13.0 09/06/2019     Lab Results   Component Value Date    HCT 22.7 02/10/2022    HCT 36.9 09/06/2019     Lab Results   Component Value Date     02/10/2022     09/06/2019     Last Basic Metabolic Panel:  Lab Results   Component Value Date     02/10/2022     06/24/2020      Lab Results   Component Value Date    POTASSIUM 4.1 02/10/2022    POTASSIUM 4.2 06/24/2020     Lab Results   Component Value Date    CHLORIDE 106 02/10/2022    CHLORIDE 103 06/24/2020     Lab Results   Component Value Date    VALENTIN 8.4 02/10/2022    VALENTIN 9.0 06/24/2020     Lab Results   Component Value Date    CO2 25 02/10/2022    CO2 27 06/24/2020     Lab Results   Component Value Date    BUN 22 02/10/2022    BUN 17 06/24/2020     Lab Results   Component Value Date    CR 1.00 02/10/2022    CR 0.81 06/24/2020     Lab Results   Component Value Date     02/11/2022     02/10/2022     06/24/2020       ASSESSMENT/PLAN: 71 year old man with recurrent blood per rectum thought to be sigmoid diverticular bleed.  However, no  clear source of bleeding after EGD x2, colonoscopy, CTA, tagged RBC scan.  Hgb dropped to 5.4 again last night, given 2 units of blood.  Repeat Hgb 7.7.    - Stat CTA with recurrent bleeding episode  - Monitor hemoglobin and transfuse for <7  - Clear liquids ok  - Monitor stool output for blood      For questions/paging, please contact the CRS office at [phone].    Livier Callahan PA-C  [facility]  Phone: [phone]    CRS Staff.  Seen and examined with Livier Callahan.  Agree with above.    Reviewed different surgical options with family if he becomes unstable.  Agree with plan for stat CTA if rebleeds.  Discussed with GI.  Pill cam Monday    I performed a history and physical examination of the patient and discussed their management with the physician assistant. I reviewed the physician assistants note and agree with the documented findings and plan of care.     Chris Cherry MD FACS FASCRS  Colorectal Surgeon       [facility]  [address], [address]  [place] [address]  T: [phone]  F: [phone]  Pager: [phone]  [email or web address]

## 2022-02-11 NOTE — PROGRESS NOTES
"GASTROENTEROLOGY PROGRESS NOTE     CC: Recurrent lower GI bleed     SUBJECTIVE:  Patient and his wife are extremely frustrated that CTA was not completed as recommended last night when he had actual bleeding episode. They called the on-call GI doctor for an order, but the doctor had no access to hospital chart to order the test. By the time they got the order in place his bleeding stopped. He is frustrated that there is no coordination between the speciality and hospital staff.     Last night he felt dizzy, short of breath and visual disturbance, nausea. The nursing staff called the hospitalitis. Hgb was checked it was 5.4. He received 2 units of blood last night. The Hgb is up to 7.7 this morning.       OBJECTIVE:  General Appearance:  alert and oriented, on supplemental oxygen.   /55 (BP Location: Left arm)   Pulse 64   Temp 97.8  F (36.6  C) (Oral)   Resp 16   Ht 1.753 m (5' 9\")   Wt 85.4 kg (188 lb 4.4 oz)   SpO2 100%   BMI 27.80 kg/m    Temp (24hrs), Av.5  F (36.9  C), Min:97.8  F (36.6  C), Max:99.3  F (37.4  C)    Patient Vitals for the past 72 hrs:   Weight   22 0648 85.4 kg (188 lb 4.4 oz)   02/10/22 0358 87.5 kg (193 lb)   02/10/22 0344 87.5 kg (193 lb)       Intake/Output Summary (Last 24 hours) at 2022 0820  Last data filed at 2022 0300  Gross per 24 hour   Intake 350 ml   Output --   Net 350 ml        PHYSICAL EXAM  SKIN: Pale    RESPIRATORY:  Lungs clear  CARDIOVASCULAR:RRR. No murmurs.On supplemental oxygen   GASTROINTESTINAL: Abdomen soft, non-tender. BS normal. No masses, organomegal  JOINT/EXTREMITIES: extremities normal- no gross deformities noted, gait normal and normal muscle tone  NEURO: Gait normal.  Sensation grossly WNL.  PSYCH: Calm.      Additional Comments:  ROS, FH, SH: See initial GI consult for details.     I have reviewed the patient's new clinical lab results:     Recent Labs   Lab Test 02/10/22  2330 02/10/22  1752 02/10/22  1210 02/10/22  0740 " 02/10/22  0408 02/08/22  1529 02/07/22  1250 02/07/22  0715 02/05/22  1145 02/05/22  1040 02/05/22  0334 02/04/22 1948   WBC  --   --   --   --  7.5  --   --  9.7  --  13.7*  --  14.4*   HGB 5.4* 8.0* 8.0*   < > 7.3*  --    < > 8.7*  8.7*   < > 7.0*  7.0*  7.0*   < > 6.2*   MCV  --   --   --   --  92  --   --  90  --  91  --  94   PLT  --   --   --   --  240  --   --  162  --  144*  --  190   INR  --   --   --   --  1.01 1.25*  --   --   --   --   --  1.29*    < > = values in this interval not displayed.     Recent Labs   Lab Test 02/10/22  0408 02/05/22  1040 02/04/22 1948   POTASSIUM 4.1 3.8 3.8   CHLORIDE 106 109 107   CO2 25 24 26   BUN 22 14 12   ANIONGAP 6 4 3     Recent Labs   Lab Test 02/10/22  0408 02/04/22  1948 02/03/22  0736 06/24/20  0746 09/06/19  1114 08/31/18  0836 08/18/17  0758 08/18/17  0757   ALBUMIN 3.0* 2.9* 3.4   < > 4.5  --    < >  --    BILITOTAL 0.2 0.2 0.3   < > 0.5  --    < >  --    ALT 15 17 21   < > 24  --    < >  --    AST 11 12 11   < > 15  --    < >  --    PROTEIN  --   --   --   --  Negative Negative  --  Negative   LIPASE  --   --  85  --   --   --   --   --     < > = values in this interval not displayed.     CT angiogram: 2/10/22  IMPRESSION:  1.  No CT evidence of ongoing GI bleed.  2.  Mild aneurysmal dilatation of the infrarenal abdominal aorta.  3.  Mild tree-in-bud nodular infiltrates in the left lung base consistent with infectious or inflammatory lower airways process.    NM GI bleed: 2/10/22  IMPRESSION:   Negative for gastrointestinal hemorrhage    Sigmoidoscopy on 2/10/22:   Impression:             - Preparation of the colon was poor.   - External hemorrhoids found on perianal exam.  - Diverticulosis in the sigmoid colon.  - Large volume clotted blood in the sigmoid and descending colon. No active bleeding or fresh blood seen.   - No specimens collected.     Active Problems:  Gastrointestinal hemorrhage, unspecified gastrointestinal hemorrhage type  Anemia due to  blood loss, acute    Assessment: Jakub Robles is a 71 year old male who presents with recurrent rectal bleeding.  He presented to ED on 2/10/22 after passage of large amount of blood per rectum. This is his third hospitalization within last 10 days for recurrent rectal bleeding. He has had 2 upper endoscopies with mild adenitis and no clear source of bleeding and colonoscopy showing sigmoid diverticulosis with blood products noted in the left colon primarily with no blood products in ascending colon and TI on the colonoscopy exam.  He also had a colonoscopy outpatient screening exam in August where multiple polyps were seen and again sigmoid diverticulosis noted.  He is not on any anticoagulation therapy.     CT angio scan was completed in the ER which did not reveal any active bleeding. He was admitted for acute blood loss anemia and received blood. NM scan did not show active bleeding. Sigmoidoscopy showed clotted blood no active bleeding.     He was dizzy, nauseated and had symptomatic anemia, Hgb was checked last night it was 5.4. He has received 2 more units and the recheck this morning was 7.7.     We had a long conversation about the next. Explained to them that his anemia was due to acute blood loss and hematology consult is not appropriate at this time. But If they are insistent we could pursue the consult.     Patient and his wife think that the surgery involves taking out the entire colon. That is their understanding from the consult with the surgeon yesterday. He is not ready for surgery yet. He would like to have another CTA and pillcam to identify the source of bleeding. We talked about the risks and accuracy of these tests.      Plan:  - Surgery following.   - We will arrange for pillcam to rule out small bowel origin cause of bleeding.   - I have requested my patient care coordinator check for the pillcam availability and  to [facility].   -   I discussed his case with Dr. Umana and Frances  RAMIRO Whittaker.   Dr. Morgan was in the patient room when I was visiting the patient.     Tavia Avalos CNP  [facility] ([facility])

## 2022-02-11 NOTE — PROGRESS NOTES
[facility]    Medicine Progress Note - Hospitalist Service        Date of Admission:  2/10/2022  3:46 AM    Assessment & Plan:   Jakub Robles is a markedly pleasant 71 year old gentleman with past medical history that is most notable for CAD and Type 2 Diabetes mellitus, who was recently admitted here for GI bleeding causing acute blood loss anemia from 2/3/22 to 2/4/22, and re-admitted from 2/4/22 to 2/8/22 for recurrent bleeding, and now returns for recurrent hematochezia suspected due to acute diverticular bleeding.    Suspected recurrent acute sigmoid divertrcular bleeding causing acute blood loss anemia  -This is his third hospitalization in the last couple of weeks for recurrent GI bleed  -Patient was just discharged by me on 2/8/2022 and now presents with new episode of GI bleed  -Given the nature of the bleed high suspicion for diverticular bleed  -Hemoglobin at the time of discharge was stable around 8.5-now, at presentation on 2/10 lower again at 7.2  -[facility] reconsulted, flexible sigmoidoscopy completed on 2/10 which revealed diverticulosis and large volume of clotted blood in the sigmoid and descending colon.  No active bleeding or fresh blood.  Discussed at length with GI yesterday, they are suspecting recurrent diverticular bleed and requested colorectal surgery evaluation for possible sigmoidectomy.  -Patient would like to first proceed with PillCam to make sure that small bowel is not potentially the source  -Monitor hemoglobin every 4 hours  -Transfuse as necessary  -If patient has another acute bleed then he will need to be sent out emergently for CT angiogram to potentially pinpoint the source of his bleeding.  This was discussed with the patient and charge nurse and his primary nurse to try to expedite and coordinate the care as quickly as possible with the radiology department.  -Monitor stool output and any clinical evidence of GI bleed    Coronary artery disease  "s/p PCI in 2016  Hypertension  Hyperlipidemia  -Continue to hold prior to admission aspirin  -Continue hold amlodipine and losartan given labile blood pressure and concerns for volume depletion from potential rebleed    Type 2 Diabetes mellitus  -PTA on Glimepiride and Metformin as an outpatient.  -Hold oral anti-diabetic medications  -High SS insulin while hospitalized.     Diet: Clear Liquid Diet     DVT Prophylaxis: Pneumatic Compression Devices   Maguire Catheter: Not present  Code Status: Full Code     Disposition Plan    Expected Discharge: 02/12/2022     Anticipated discharge location:  Awaiting care coordination huddle  Delays:        Entered: Calixto Morgan MD 02/11/2022, 10:12 AM        Clinically Significant Risk Factors Present on Admission              # Diabetes, type II: last A1C 7.4 % (Ref range: 0.0 - 5.6 %)  # Overweight: Estimated body mass index is 27.8 kg/m  as calculated from the following:    Height as of this encounter: 1.753 m (5' 9\").    Weight as of this encounter: 85.4 kg (188 lb 4.4 oz).        The patient's care was discussed with the Bedside Nurse and Patient.    Over 40 minutes was spent with this patient and his wife at the bedside in the presence of GI NP during today's visit and greater than >50% of the time was spent counseling and coordinating patient's care.    Calixto Morgan MD  Hospitalist Service  [facility]  Text Page 7AM-6PM  Securely message with the Vocera Web Console (learn more here)  Text page via [employer] Paging/Directory    ______________________________________________________________________    Interval History   Events overnight noted.  Long discussion with patient and his wife at the bedside in the presence of [facility] NP, Tavia.  Patient had an episode of hypotension, lightheadedness and dizziness after he woke up from sleep around 1130 last night and a repeat hemoglobin check showed that the hemoglobin had dropped to 5.4.  No " "hematochezia, melena or hematemesis reported at that time.  Patient is s/p 2 units of PRBC overnight and now hemoglobin is 7.7.  Patient and his wife also expressed frustration that we could not repeat CT angiogram overnight due to arrangement of logistics and coordination of care.  Patient mentions that he feels fine after blood transfusion without any new symptoms this morning.  No abdominal pain.  No cramping.      Data reviewed today: I reviewed all medications, new labs and imaging results over the last 24 hours. I personally reviewed no images or EKG's today.    Physical Exam   Vital signs:  Temp: 98  F (36.7  C) Temp src: Oral BP: 102/54 Pulse: 68   Resp: 17 SpO2: 99 % O2 Device: Oxymask Oxygen Delivery: 1 LPM Height: 175.3 cm (5' 9\") Weight: 85.4 kg (188 lb 4.4 oz)  Estimated body mass index is 27.8 kg/m  as calculated from the following:    Height as of this encounter: 1.753 m (5' 9\").    Weight as of this encounter: 85.4 kg (188 lb 4.4 oz).     Gen: AAOX3, NAD, comfortable  HEENT: pallor+  Resp: CTA B/L, normal WOB  CVS- RRR, no murmur, no edema  Abd/GI: Soft, non-tender. BS- normoactive  Skin- Warm, dry no rashes  Neuro- CN- intact. No focal deficits.         Wt Readings from Last 2 Encounters:   02/11/22 85.4 kg (188 lb 4.4 oz)   02/08/22 87.6 kg (193 lb 3.2 oz)             Data   Recent Labs   Lab 02/11/22  0844 02/11/22  0820 02/11/22  0613 02/11/22  0242 02/10/22  2330 02/10/22  1835 02/10/22  1752 02/10/22  0740 02/10/22  0408 02/08/22  1529 02/07/22  1032 02/07/22  0715 02/05/22  1145 02/05/22  1040 02/05/22  0309 02/04/22  1948   WBC  --   --   --   --   --   --   --   --  7.5  --   --  9.7  --  13.7*  --  14.4*   HGB 7.7*  --   --   --  5.4*  --  8.0*   < > 7.3*  --    < > 8.7*  8.7*   < > 7.0*  7.0*  7.0*   < > 6.2*   MCV  --   --   --   --   --   --   --   --  92  --   --  90  --  91  --  94   PLT  --   --   --   --   --   --   --   --  240  --   --  162  --  144*  --  190   INR  --   --   -- "   --   --   --   --   --  1.01 1.25*  --   --   --   --   --  1.29*   NA  --   --   --   --   --   --   --   --  137  --   --   --   --  137  --  136   POTASSIUM  --   --   --   --   --   --   --   --  4.1  --   --   --   --  3.8  --  3.8   CHLORIDE  --   --   --   --   --   --   --   --  106  --   --   --   --  109  --  107   CO2  --   --   --   --   --   --   --   --  25  --   --   --   --  24  --  26   BUN  --   --   --   --   --   --   --   --  22  --   --   --   --  14  --  12   CR  --   --   --   --   --   --   --   --  1.00  --   --   --   --  0.89  --  0.84   ANIONGAP  --   --   --   --   --   --   --   --  6  --   --   --   --  4  --  3   VALENTIN  --   --   --   --   --   --   --   --  8.4*  --   --   --   --  7.4*  --  7.7*   GLC  --  136* 160* 221*  --    < >  --    < > 253*  --    < >  --    < > 191*   < > 248*   ALBUMIN  --   --   --   --   --   --   --   --  3.0*  --   --   --   --   --   --  2.9*   PROTTOTAL  --   --   --   --   --   --   --   --  6.4*  --   --   --   --   --   --  5.4*   BILITOTAL  --   --   --   --   --   --   --   --  0.2  --   --   --   --   --   --  0.2   ALKPHOS  --   --   --   --   --   --   --   --  40  --   --   --   --   --   --  36*   ALT  --   --   --   --   --   --   --   --  15  --   --   --   --   --   --  17   AST  --   --   --   --   --   --   --   --  11  --   --   --   --   --   --  12    < > = values in this interval not displayed.       Recent Results (from the past 24 hour(s))   NM GI Bleed    Narrative    EXAM: NM GI BLEED  LOCATION: [facility]  DATE/TIME: 2/10/2022 2:06 PM    INDICATION: GI bleed. Melanoma.  COMPARISON: CT of the abdomen pelvis dated 02/10/2022.  TECHNIQUE: 27.0 mCi technetium-99m, in vitro tagged RBCs, IV. Dynamic anterior planar imaging of the abdomen for 65 minutes.    FINDINGS: Normal background activity without scintigraphic evidence of gastrointestinal bleeding during the imaging period.      Impression     IMPRESSION:    Negative for gastrointestinal hemorrhage.     Medications       atorvastatin  40 mg Oral Daily     insulin aspart  1-12 Units Subcutaneous Q4H     pantoprazole (PROTONIX) IV  40 mg Intravenous BID     sodium chloride (PF)  3 mL Intracatheter Q8H     sodium chloride (PF)  3 mL Intracatheter Q8H

## 2022-02-11 NOTE — PROGRESS NOTES
SPIRITUAL HEALTH SERVICES  SPIRITUAL ASSESSMENT Progress Note  [facility]    REFERRAL SOURCE: IDT rounds    Introductory visit with Jakub and spouse Esmer to assess support needs in light of stressful hospitalization. Jakub shared that this afternoon he wants to focus on resting a bit, with Esmer in agreement. Both were appreciative of check in. I oriented to  availability and how to request support during hospital stay.      PLAN: [facility] remains available for patient, family, and staff support.     Please page the on call  by placing a STAT or ASAP Epic referral for Spiritual Health (which will roll to the on call pager).        ADAN Rose.   Associate   Pager [phone]

## 2022-02-11 NOTE — PROGRESS NOTES
GI  Called by wife/discussed with RN  Pt with rapid drop in hgb. Will repeat CT angio. Noted Cr 1.0    Dennis Angel MD  [facility]   Office: [phone]   Pager: [phone]  Monday-Thursday 8am to 5pm, Fridays 8am to 4pm

## 2022-02-11 NOTE — PROVIDER NOTIFICATION
MD Notification    Notified Person: MD    Notified Person Name: Eddie     Notification Date/Time: 2/11/22, 1058    Notification Interaction: [employer] messaging     Purpose of Notification: FYI dark bloody watery stool. Patient asymptomatic, VSS. Patient does not feel like he is currently bleeding, most likely from last night. Patient does not want CTA right now.     Orders Received: none, continue to monitor    Comments:

## 2022-02-11 NOTE — PROVIDER NOTIFICATION
MD Notification    Notified Person: MD    Notified Person Name:Curt    Notification Date/Time:2/10 2350    Notification Interaction:web page    Purpose of Notification:2420 CP - pts Hgb 5.4. Please place transfusion orders. Thanks.    Orders Received:Tranfusion orders placed    Comments:

## 2022-02-11 NOTE — PROGRESS NOTES
A/O x4 on RA. VSS. On 75 ml IV NS. Up independently to BR. Diet changed to Clear liquid.  ans 119. HGb blood draw Q6hr. Last at 1800, result not yet out. Wife at bedside.

## 2022-02-11 NOTE — PROVIDER NOTIFICATION
MD Notification    Notified Person: MD    Notified Person Name: TORIN BELLAMY    Notification Date/Time: 2334    Notification Interaction: AMCOM    Purpose of Notification: Pt BP 83/53, recheck 91/51 HR 95-98, SOB, lightheaded, feeling sick, nauseous. gave zofran + oxygen 2L. they just came and checked hgb, awaiting results.    Orders Received:  bolus  Comments:

## 2022-02-12 LAB
GLUCOSE BLDC GLUCOMTR-MCNC: 131 MG/DL (ref 70–99)
GLUCOSE BLDC GLUCOMTR-MCNC: 137 MG/DL (ref 70–99)
GLUCOSE BLDC GLUCOMTR-MCNC: 150 MG/DL (ref 70–99)
GLUCOSE BLDC GLUCOMTR-MCNC: 150 MG/DL (ref 70–99)
GLUCOSE BLDC GLUCOMTR-MCNC: 153 MG/DL (ref 70–99)
GLUCOSE BLDC GLUCOMTR-MCNC: 159 MG/DL (ref 70–99)
HGB BLD-MCNC: 7.7 G/DL (ref 13.3–17.7)
HGB BLD-MCNC: 8 G/DL (ref 13.3–17.7)
HGB BLD-MCNC: 8.3 G/DL (ref 13.3–17.7)
HGB BLD-MCNC: 8.4 G/DL (ref 13.3–17.7)

## 2022-02-12 PROCEDURE — C9113 INJ PANTOPRAZOLE SODIUM, VIA: HCPCS | Performed by: HOSPITALIST

## 2022-02-12 PROCEDURE — 99233 SBSQ HOSP IP/OBS HIGH 50: CPT | Performed by: INTERNAL MEDICINE

## 2022-02-12 PROCEDURE — 85018 HEMOGLOBIN: CPT | Performed by: INTERNAL MEDICINE

## 2022-02-12 PROCEDURE — 120N000001 HC R&B MED SURG/OB

## 2022-02-12 PROCEDURE — 36415 COLL VENOUS BLD VENIPUNCTURE: CPT | Performed by: INTERNAL MEDICINE

## 2022-02-12 PROCEDURE — 250N000011 HC RX IP 250 OP 636: Performed by: HOSPITALIST

## 2022-02-12 RX ADMIN — PANTOPRAZOLE SODIUM 40 MG: 40 INJECTION, POWDER, FOR SOLUTION INTRAVENOUS at 21:00

## 2022-02-12 RX ADMIN — PANTOPRAZOLE SODIUM 40 MG: 40 INJECTION, POWDER, FOR SOLUTION INTRAVENOUS at 08:18

## 2022-02-12 ASSESSMENT — ACTIVITIES OF DAILY LIVING (ADL)
ADLS_ACUITY_SCORE: 7
ADLS_ACUITY_SCORE: 7
TOILETING_ISSUES: NO
ADLS_ACUITY_SCORE: 7
ADLS_ACUITY_SCORE: 3
ADLS_ACUITY_SCORE: 7
HEARING_DIFFICULTY_OR_DEAF: NO
ADLS_ACUITY_SCORE: 7
ADLS_ACUITY_SCORE: 7
ADLS_ACUITY_SCORE: 3
DIFFICULTY_COMMUNICATING: NO
ADLS_ACUITY_SCORE: 3
ADLS_ACUITY_SCORE: 3
ADLS_ACUITY_SCORE: 7
ADLS_ACUITY_SCORE: 3
FALL_HISTORY_WITHIN_LAST_SIX_MONTHS: NO
DRESSING/BATHING_DIFFICULTY: NO
DIFFICULTY_EATING/SWALLOWING: NO
CONCENTRATING,_REMEMBERING_OR_MAKING_DECISIONS_DIFFICULTY: NO
WEAR_GLASSES_OR_BLIND: NO
DOING_ERRANDS_INDEPENDENTLY_DIFFICULTY: NO
ADLS_ACUITY_SCORE: 3
ADLS_ACUITY_SCORE: 3
ADLS_ACUITY_SCORE: 7
ADLS_ACUITY_SCORE: 7
WALKING_OR_CLIMBING_STAIRS_DIFFICULTY: NO

## 2022-02-12 ASSESSMENT — MIFFLIN-ST. JEOR: SCORE: 1587.38

## 2022-02-12 NOTE — PLAN OF CARE
Independent, alert and oriented with appropriate use of the call light.  Lung sounds clear, encouraged respiratory exercises.  Bowel sounds present, passing flatus, one bloody stool,  tolerating diet.  Voiding with adequate urine output.  No complaints of pain.  Lab called at 2030 last evening about critical Hgb 6.8, asymptomatic, EFRAIN Singleton notified with orders to transfuse 1 unit of blood.  No transfusion reaction with new Hgb 7.7.  Spouse received multiple updates regarding present condition.  Continue to monitor.

## 2022-02-12 NOTE — PROGRESS NOTES
Notified of Hgb 6.8. Note and chart reviewed. No current bleeding. Patient asymptomatic. VSS.    - Will transfuse 1 unit PRBC given CRS rec to keep Hgb > 7  - Hold on stat CTA for now given, no bleeding or symptoms associated with Hgb drop  - Continue serial monitoring    Esthela Singleton PA-C

## 2022-02-12 NOTE — PLAN OF CARE
Patient alert and oriented x4. VSS on RA. Tele NSR. Denies pain, occasional abd discomfort. Up in room independently. Tolerating clear liquid diet. Voiding fine, BM - dark red, likely old blood from bleeding episode last night. Monitoring for now. [facility] and CR surgery following. Before making any surgical decisions, patient and wife would like further work up. Planning for pill cam study next. Will be done Monday AM. Orders for bowel prep to start Sunday at 3pm, NPO at [facility] on Monday. All other imaging/procedures have not found source of bleeding. Thinking likely diverticular bleed. If patient were to become symptomatic again/pass bright red blood through stool, will need STAT CTA abd pelvis w/ contrast. This situation will need to be treated emergently to potentially pinpoint source of bleeding. Make sure patient always has working 18G IV. Imaging department aware of situation. Hgb checks Q4H, latest 7.8. Discharge pending.

## 2022-02-12 NOTE — PROGRESS NOTES
[facility]    Medicine Progress Note - Hospitalist Service    Date of Admission:  2/10/2022    Assessment & Plan        Jakub Robles is a markedly pleasant 71 year old gentleman with past medical history that is most notable for CAD and Type 2 Diabetes mellitus, who was recently admitted here for GI bleeding causing acute blood loss anemia from 2/3/22 to 2/4/22, and re-admitted from 2/4/22 to 2/8/22 for recurrent bleeding, and now returns for recurrent hematochezia suspected due to acute diverticular bleeding.     Suspected recurrent acute sigmoid divertrcular bleeding causing acute blood loss anemia  -This is his third hospitalization in the last couple of weeks for recurrent GI bleed  -Given the nature of the bleed high suspicion for diverticular bleed  -Hemoglobin at the time of discharge was stable around 8.5-now, at presentation on 2/10 lower again at 7.2  -Appreciate [facility] reconsulted, flexible sigmoidoscopy completed on 2/10 which revealed diverticulosis and large volume of clotted blood in the sigmoid and descending colon.  No active bleeding or fresh blood.    -Appreciate colorectal surgery evaluation.  Continue serial hemoglobin checks.  Start CTA if bleeding picks up.  Await capsule study Monday    -Transfuse as necessary (PRBC- 2/11)     Coronary artery disease s/p PCI in 2016  Hypertension  Hyperlipidemia  -Continue to hold prior to admission aspirin  -Continue hold Lipitor, amlodipine and losartan given labile blood pressure and concerns for volume depletion from potential rebleed     Type 2 Diabetes mellitus  -PTA on Glimepiride and Metformin as an outpatient.  -Hold oral anti-diabetic medications  -High SS insulin while hospitalized.     Diet: Clear Liquid Diet  NPO per Anesthesia Guidelines for Procedure/Surgery Except for: Meds    DVT Prophylaxis: Pneumatic Compression Devices  Maguire Catheter: Not present  Central Lines: None  Cardiac Monitoring: ACTIVE order.  "Indication: Syncope- high cardiac risk (48 hours)  Code Status: Full Code      Disposition Plan   Expected Discharge: 02/14/2022     Anticipated discharge location:  Awaiting care coordination huddle  Delays:         The patient's care was discussed with the Bedside Nurse, Patient and Patient's Family.    Nickolas Mark MD, MD  Hospitalist Service  [facility]  Securely message with the Vocera Web Console (learn more here)  Text page via [employer] Paging/Directory         Clinically Significant Risk Factors Present on Admission             # Overweight: Estimated body mass index is 27.25 kg/m  as calculated from the following:    Height as of this encounter: 1.753 m (5' 9\").    Weight as of this encounter: 83.7 kg (184 lb 8.4 oz).      ______________________________________________________________________    Interval History   No further episodes of bleeding.  Last BM with some dried red blood at 8 AM.  Denies any chest pain/shortness of breath/palpitations.     4 point ROS done and neg unless mentioned    Data reviewed today: I reviewed all medications, new labs and imaging results over the last 24 hours. I personally reviewed the NM  image(s) showing as below.    Physical Exam   BP (!) 149/70 (BP Location: Left arm)   Pulse 65   Temp 97.9  F (36.6  C) (Oral)   Resp 17   Ht 1.753 m (5' 9\")   Wt 83.7 kg (184 lb 8.4 oz)   SpO2 100%   BMI 27.25 kg/m    Gen- pleasant lying in bed  HEENT- NAD, PARDEEP  Neck- supple, no JVD elevation, no thyromegaly  CVS- I+II+ no m/r/g  RS- CTABS  Abdo- soft, no tenderness . No g/r/r  Ext- no edema   CNS- no focal signs       Data    EXAM: NM GI BLEED   LOCATION: [facility]   DATE/TIME: 2/10/2022 2:06 PM   IMPRESSION:     Negative for gastrointestinal hemorrhage.    EXAM: CTA ABDOMEN PELVIS WITH CONTRAST   LOCATION: [facility]   DATE/TIME: 2/10/2022 4:15 AM     IMPRESSION:   1.  No CT evidence of ongoing GI " bleed.     2.  Mild aneurysmal dilatation of the infrarenal abdominal aorta.     3.  Mild tree-in-bud nodular infiltrates in the left lung base consistent with infectious or inflammatory lower airways process.    BMPRecent Labs   Lab 02/12/22  1101 02/12/22  0758 02/12/22  0535 02/12/22  0127 02/10/22  0830 02/10/22  0408   NA  --   --   --   --   --  137   POTASSIUM  --   --   --   --   --  4.1   CHLORIDE  --   --   --   --   --  106   VALENTIN  --   --   --   --   --  8.4*   CO2  --   --   --   --   --  25   BUN  --   --   --   --   --  22   CR  --   --   --   --   --  1.00   * 150* 159* 153*   < > 253*    < > = values in this interval not displayed.     CBC  Recent Labs   Lab 02/12/22  0625 02/10/22  0740 02/10/22  0408 02/07/22  1250 02/07/22  0715   WBC  --   --  7.5  --  9.7   RBC  --   --  2.48*  --  3.01*   HGB 8.0*   < > 7.3*   < > 8.7*  8.7*   HCT  --   --  22.7*  --  27.2*   MCV  --   --  92  --  90   MCH  --   --  29.4  --  28.9   MCHC  --   --  32.2  --  32.0   RDW  --   --  14.5  --  14.6   PLT  --   --  240  --  162    < > = values in this interval not displayed.     INR  Recent Labs   Lab 02/10/22  0408 02/08/22  1529   INR 1.01 1.25*     LFTs  Recent Labs   Lab 02/10/22  0408   ALKPHOS 40   AST 11   ALT 15   BILITOTAL 0.2   PROTTOTAL 6.4*   ALBUMIN 3.0*      PANCNo lab results found in last 7 days.    No results found for this or any previous visit (from the past 24 hour(s)).

## 2022-02-12 NOTE — PROVIDER NOTIFICATION
Paged Dr. Mark about patient family questions and concerns, requested to come and see patient and family.

## 2022-02-12 NOTE — PROGRESS NOTES
Pt is AOx4 and indpendent in room. LS clear and equal bilaterally, bowel sounds are hypoactive. Pt hgb was 8 and went up to 8.4 at recheck, he is on hgb checks q8hrs. Lipitor is being held per Dr. Mark per request of patient. Clear liquid diet, will start bowel prep tomorrow afternoon and go NPO at 12am on 2/14/22. Pt is on telemetry-NSR.

## 2022-02-12 NOTE — PROGRESS NOTES
COLON & RECTAL SURGERY  PROGRESS NOTE    February 12, 2022    SUBJECTIVE:  2 small BMs overnight that appear to be mostly old blood per photos on patients mobile phone.  Denies pain. No nausea or vomiting.    One unit of pRBC overnight with good response and Hg holding this AM at 8.      OBJECTIVE:  Temp:  [97.6  F (36.4  C)-98.3  F (36.8  C)] 98.2  F (36.8  C)  Pulse:  [62-67] 63  Resp:  [16-18] 17  BP: (110-152)/(51-61) 152/58  SpO2:  [93 %-100 %] 97 %    Intake/Output Summary (Last 24 hours) at 2/12/2022 1103  Last data filed at 2/12/2022 0842  Gross per 24 hour   Intake 784 ml   Output 250 ml   Net 534 ml       GENERAL:  Awake, alert, no acute distress  HEAD: Nomocephalic atraumatic  SCLERA: anicteric  EXTREMITIES: warm and well perfused  ABDOMEN:  Soft    LABS:  Lab Results   Component Value Date    WBC 7.5 02/10/2022    WBC 5.8 09/06/2019     Lab Results   Component Value Date    HGB 8.0 02/12/2022    HGB 13.0 09/06/2019     Lab Results   Component Value Date    HCT 22.7 02/10/2022    HCT 36.9 09/06/2019     Lab Results   Component Value Date     02/10/2022     09/06/2019     Last Basic Metabolic Panel:  Lab Results   Component Value Date     02/10/2022     06/24/2020      Lab Results   Component Value Date    POTASSIUM 4.1 02/10/2022    POTASSIUM 4.2 06/24/2020     Lab Results   Component Value Date    CHLORIDE 106 02/10/2022    CHLORIDE 103 06/24/2020     Lab Results   Component Value Date    VALENTIN 8.4 02/10/2022    VALENTIN 9.0 06/24/2020     Lab Results   Component Value Date    CO2 25 02/10/2022    CO2 27 06/24/2020     Lab Results   Component Value Date    BUN 22 02/10/2022    BUN 17 06/24/2020     Lab Results   Component Value Date    CR 1.00 02/10/2022    CR 0.81 06/24/2020     Lab Results   Component Value Date     02/12/2022     02/10/2022     06/24/2020       ASSESSMENT/PLAN:  GI bleed, likely diverticular.      Hemodynamically stable, responding appropriately  to blood, output volume 2 BMs in 12 hours, output quality seems to be old blood.      Patient seems to be holding steady.      I explained quite a bit about the management of diverticular GI bleeds and the likely algorithm we would pursue in different clinical scenarios.  Patient and wife had lots of good questions that I addressed.  They seemed satisfied at the end of our discussion.      For now, continue serial Hg checks and transfuse for Hg <7.  Stat CTA if bleeding picks up.  Continue clears.     Await capsule study Monday.     Will discuss with Dr. Hernandez and addend with updates       Osvaldo Hodge MD, MPH  Fellow in Colon and Rectal Surgery  [facility]  Pager: [phone]

## 2022-02-13 LAB
GLUCOSE BLDC GLUCOMTR-MCNC: 124 MG/DL (ref 70–99)
GLUCOSE BLDC GLUCOMTR-MCNC: 145 MG/DL (ref 70–99)
GLUCOSE BLDC GLUCOMTR-MCNC: 151 MG/DL (ref 70–99)
GLUCOSE BLDC GLUCOMTR-MCNC: 158 MG/DL (ref 70–99)
GLUCOSE BLDC GLUCOMTR-MCNC: 176 MG/DL (ref 70–99)
HGB BLD-MCNC: 8.3 G/DL (ref 13.3–17.7)
HGB BLD-MCNC: 8.8 G/DL (ref 13.3–17.7)

## 2022-02-13 PROCEDURE — C9113 INJ PANTOPRAZOLE SODIUM, VIA: HCPCS | Performed by: HOSPITALIST

## 2022-02-13 PROCEDURE — 36415 COLL VENOUS BLD VENIPUNCTURE: CPT | Performed by: INTERNAL MEDICINE

## 2022-02-13 PROCEDURE — 85018 HEMOGLOBIN: CPT | Performed by: INTERNAL MEDICINE

## 2022-02-13 PROCEDURE — 99232 SBSQ HOSP IP/OBS MODERATE 35: CPT | Performed by: INTERNAL MEDICINE

## 2022-02-13 PROCEDURE — 250N000013 HC RX MED GY IP 250 OP 250 PS 637: Performed by: NURSE PRACTITIONER

## 2022-02-13 PROCEDURE — 120N000001 HC R&B MED SURG/OB

## 2022-02-13 PROCEDURE — 250N000011 HC RX IP 250 OP 636: Performed by: HOSPITALIST

## 2022-02-13 RX ADMIN — POLYETHYLENE GLYCOL 3350 238 G: 17 POWDER, FOR SOLUTION ORAL at 15:36

## 2022-02-13 RX ADMIN — PANTOPRAZOLE SODIUM 40 MG: 40 INJECTION, POWDER, FOR SOLUTION INTRAVENOUS at 21:26

## 2022-02-13 RX ADMIN — PANTOPRAZOLE SODIUM 40 MG: 40 INJECTION, POWDER, FOR SOLUTION INTRAVENOUS at 08:27

## 2022-02-13 ASSESSMENT — ACTIVITIES OF DAILY LIVING (ADL)
ADLS_ACUITY_SCORE: 3

## 2022-02-13 NOTE — PROVIDER NOTIFICATION
"Dr. Mark paged \"Can you please change the time for lab to draw HGb? As is, the values will be available at shift change, which becomes complicated if we need to transfuse. TY\"  "

## 2022-02-13 NOTE — PROGRESS NOTES
Pt is AOx4 and indpendent in room. LS clear and equal bilaterally, bowel sounds audible.Hemoglobin check was moved from q8hrs to q12 due to stability. Started bowel prep this shift. BG levels 151/124/176. Pt to be NPO at midnight for procedure in morning.  Telemetry discontinued.

## 2022-02-13 NOTE — PROGRESS NOTES
COLON & RECTAL SURGERY  PROGRESS NOTE    February 13, 2022    SUBJECTIVE:  2 BMs yesterday morning. None overnight.  Feels well.  Hg holding at 8    OBJECTIVE:  Temp:  [97.7  F (36.5  C)-98.3  F (36.8  C)] 98.1  F (36.7  C)  Pulse:  [60-68] 68  Resp:  [16-18] 18  BP: (130-149)/(61-71) 134/66  SpO2:  [97 %-100 %] 99 %    Intake/Output Summary (Last 24 hours) at 2/13/2022 0955  Last data filed at 2/13/2022 0824  Gross per 24 hour   Intake 1800 ml   Output --   Net 1800 ml       GENERAL:  Awake, alert, no acute distress  HEAD: Nomocephalic atraumatic  SCLERA: anicteric  EXTREMITIES: warm and well perfused  ABDOMEN:  Soft, NT ND    LABS:  Lab Results   Component Value Date    WBC 7.5 02/10/2022    WBC 5.8 09/06/2019     Lab Results   Component Value Date    HGB 8.3 02/13/2022    HGB 13.0 09/06/2019     Lab Results   Component Value Date    HCT 22.7 02/10/2022    HCT 36.9 09/06/2019     Lab Results   Component Value Date     02/10/2022     09/06/2019     Last Basic Metabolic Panel:  Lab Results   Component Value Date     02/10/2022     06/24/2020      Lab Results   Component Value Date    POTASSIUM 4.1 02/10/2022    POTASSIUM 4.2 06/24/2020     Lab Results   Component Value Date    CHLORIDE 106 02/10/2022    CHLORIDE 103 06/24/2020     Lab Results   Component Value Date    VALENTIN 8.4 02/10/2022    VALENTIN 9.0 06/24/2020     Lab Results   Component Value Date    CO2 25 02/10/2022    CO2 27 06/24/2020     Lab Results   Component Value Date    BUN 22 02/10/2022    BUN 17 06/24/2020     Lab Results   Component Value Date    CR 1.00 02/10/2022    CR 0.81 06/24/2020     Lab Results   Component Value Date     02/13/2022     02/10/2022     06/24/2020       ASSESSMENT/PLAN:  GI bleed, likely diverticular.      Seems to be resolving.  Continue serial checks per primary.     Capsule tomorrow per GI      Osvaldo Hodge MD, MPH  Fellow in Colon and Rectal Surgery  [facility]  [place]  Pager: [phone]

## 2022-02-13 NOTE — PLAN OF CARE
Independent, alert and oriented with appropriate use of the call light.  Lung sounds clear, encouraged respiratory exercises.  Bowel sounds present, passing flatus, tolerating diet.  No bloody stools this shift.  Voiding with adequate urine output.  No complaints of pain.  Continue to monitor.

## 2022-02-13 NOTE — PROGRESS NOTES
[facility]    Medicine Progress Note - Hospitalist Service    Date of Admission:  2/10/2022    Assessment & Plan        Jakub Robles is a markedly pleasant 71 year old gentleman with past medical history that is most notable for CAD and Type 2 Diabetes mellitus, who was recently admitted here for GI bleeding causing acute blood loss anemia from 2/3/22 to 2/4/22, and re-admitted from 2/4/22 to 2/8/22 for recurrent bleeding, and now returns for recurrent hematochezia suspected due to acute diverticular bleeding.     Suspected recurrent acute sigmoid divertrcular bleeding causing acute blood loss anemia  -This is his third hospitalization in the last couple of weeks for recurrent GI bleed  -Given the nature of the bleed high suspicion for diverticular bleed  -Hemoglobin at the time of discharge was stable around 8.5-now, at presentation on 2/10 lower again at 7.2  -Appreciate [facility] reconsulted, flexible sigmoidoscopy completed on 2/10 which revealed diverticulosis and large volume of clotted blood in the sigmoid and descending colon.  No active bleeding or fresh blood.    -Appreciate colorectal surgery evaluation.  Continue serial hemoglobin checks.  Stat CTA if bleeding picks up.  Await capsule study Monday (currently hemoglobin appears stable)     -Transfuse as necessary (PRBC- 2/11)     Coronary artery disease s/p PCI in 2016  Hypertension  Hyperlipidemia  -Continue to hold prior to admission aspirin  -Continue hold Lipitor, amlodipine and losartan given labile blood pressure and concerns for volume depletion from potential rebleed     Type 2 Diabetes mellitus  -PTA on Glimepiride and Metformin as an outpatient.  -Hold oral anti-diabetic medications  -High SS insulin while hospitalized.     Diet: Clear Liquid Diet  NPO per Anesthesia Guidelines for Procedure/Surgery Except for: Meds    DVT Prophylaxis: Pneumatic Compression Devices  Maguire Catheter: Not present  Central Lines:  "None  Cardiac Monitoring: None  Code Status: Full Code      Disposition Plan   Expected Discharge: 02/14/2022     Anticipated discharge location:  Awaiting care coordination huddle  Delays:         The patient's care was discussed with the Bedside Nurse, Patient and Patient's Family.    Nickolas Mark MD, MD  Hospitalist Service  [facility]  Securely message with the Vocera Web Console (learn more here)  Text page via [employer] Paging/Directory         Clinically Significant Risk Factors Present on Admission             # Overweight: Estimated body mass index is 27.25 kg/m  as calculated from the following:    Height as of this encounter: 1.753 m (5' 9\").    Weight as of this encounter: 83.7 kg (184 lb 8.4 oz).      ______________________________________________________________________    Interval History   No further episodes of bleeding last 24 hours   denies any chest pain/shortness of breath/palpitations.     4 point ROS done and neg unless mentioned    Data reviewed today: I reviewed all medications, new labs and imaging results over the last 24 hours. I personally reviewed no images or EKG's today.    Physical Exam   /66 (BP Location: Left arm)   Pulse 68   Temp 98.1  F (36.7  C) (Oral)   Resp 18   Ht 1.753 m (5' 9\")   Wt 83.7 kg (184 lb 8.4 oz)   SpO2 99%   BMI 27.25 kg/m    Gen- pleasant lying in bed  HEENT- NAD, PARDEEP  CVS- I+II+ no m/r/g  RS- CTAB  Abdo- soft, no tenderness . No g/r/r  Ext- no edema        Data    EXAM: NM GI BLEED   LOCATION: [facility]   DATE/TIME: 2/10/2022 2:06 PM   IMPRESSION:     Negative for gastrointestinal hemorrhage.    EXAM: CTA ABDOMEN PELVIS WITH CONTRAST   LOCATION: [facility]   DATE/TIME: 2/10/2022 4:15 AM     IMPRESSION:   1.  No CT evidence of ongoing GI bleed.     2.  Mild aneurysmal dilatation of the infrarenal abdominal aorta.     3.  Mild tree-in-bud nodular infiltrates in the left " lung base consistent with infectious or inflammatory lower airways process.    BMP  Recent Labs   Lab 02/13/22  1113 02/13/22  0737 02/13/22  0153 02/12/22  2059 02/10/22  0830 02/10/22  0408   NA  --   --   --   --   --  137   POTASSIUM  --   --   --   --   --  4.1   CHLORIDE  --   --   --   --   --  106   VALENTIN  --   --   --   --   --  8.4*   CO2  --   --   --   --   --  25   BUN  --   --   --   --   --  22   CR  --   --   --   --   --  1.00   * 151* 145* 131*   < > 253*    < > = values in this interval not displayed.     CBC  Recent Labs   Lab 02/13/22  0527 02/10/22  0740 02/10/22  0408 02/07/22  1250 02/07/22 0715   WBC  --   --  7.5  --  9.7   RBC  --   --  2.48*  --  3.01*   HGB 8.3*   < > 7.3*   < > 8.7*  8.7*   HCT  --   --  22.7*  --  27.2*   MCV  --   --  92  --  90   MCH  --   --  29.4  --  28.9   MCHC  --   --  32.2  --  32.0   RDW  --   --  14.5  --  14.6   PLT  --   --  240  --  162    < > = values in this interval not displayed.     INR  Recent Labs   Lab 02/10/22  0408 02/08/22  1529   INR 1.01 1.25*     LFTs  Recent Labs   Lab 02/10/22  0408   ALKPHOS 40   AST 11   ALT 15   BILITOTAL 0.2   PROTTOTAL 6.4*   ALBUMIN 3.0*

## 2022-02-14 ENCOUNTER — MYC MEDICAL ADVICE (OUTPATIENT)
Dept: INTERNAL MEDICINE | Facility: CLINIC | Age: 72
End: 2022-02-14
Payer: MEDICARE

## 2022-02-14 LAB
GLUCOSE BLDC GLUCOMTR-MCNC: 116 MG/DL (ref 70–99)
GLUCOSE BLDC GLUCOMTR-MCNC: 135 MG/DL (ref 70–99)
GLUCOSE BLDC GLUCOMTR-MCNC: 175 MG/DL (ref 70–99)
GLUCOSE BLDC GLUCOMTR-MCNC: 190 MG/DL (ref 70–99)
GLUCOSE BLDC GLUCOMTR-MCNC: 238 MG/DL (ref 70–99)
HGB BLD-MCNC: 8.9 G/DL (ref 13.3–17.7)
HGB BLD-MCNC: 9.4 G/DL (ref 13.3–17.7)

## 2022-02-14 PROCEDURE — 85018 HEMOGLOBIN: CPT | Performed by: INTERNAL MEDICINE

## 2022-02-14 PROCEDURE — 36415 COLL VENOUS BLD VENIPUNCTURE: CPT | Performed by: INTERNAL MEDICINE

## 2022-02-14 PROCEDURE — 120N000001 HC R&B MED SURG/OB

## 2022-02-14 PROCEDURE — 250N000011 HC RX IP 250 OP 636: Performed by: HOSPITALIST

## 2022-02-14 PROCEDURE — C9113 INJ PANTOPRAZOLE SODIUM, VIA: HCPCS | Performed by: HOSPITALIST

## 2022-02-14 PROCEDURE — 99232 SBSQ HOSP IP/OBS MODERATE 35: CPT | Performed by: INTERNAL MEDICINE

## 2022-02-14 RX ADMIN — PANTOPRAZOLE SODIUM 40 MG: 40 INJECTION, POWDER, FOR SOLUTION INTRAVENOUS at 09:25

## 2022-02-14 RX ADMIN — PANTOPRAZOLE SODIUM 40 MG: 40 INJECTION, POWDER, FOR SOLUTION INTRAVENOUS at 21:19

## 2022-02-14 ASSESSMENT — ACTIVITIES OF DAILY LIVING (ADL)
ADLS_ACUITY_SCORE: 3

## 2022-02-14 NOTE — PROGRESS NOTES
[facility]    Medicine Progress Note - Hospitalist Service    Date of Admission:  2/10/2022    Assessment & Plan        Jakub Robles is a markedly pleasant 71 year old gentleman with past medical history that is most notable for CAD and Type 2 Diabetes mellitus, who was recently admitted here for GI bleeding causing acute blood loss anemia from 2/3/22 to 2/4/22, and re-admitted from 2/4/22 to 2/8/22 for recurrent bleeding, and now returns for recurrent hematochezia suspected due to acute diverticular bleeding.     Suspected recurrent acute sigmoid divertrcular bleeding causing acute blood loss anemia  -This is his third hospitalization in the last couple of weeks for recurrent GI bleed  -Given the nature of the bleed high suspicion for diverticular bleed  -Hemoglobin at the time of discharge was stable around 8.5-now, at presentation on 2/10 lower again at 7.2  -Appreciate [place] [facility] reconsulted, flexible sigmoidoscopy completed on 2/10 which revealed diverticulosis and large volume of clotted blood in the sigmoid and descending colon.  No active bleeding or fresh blood.    -Appreciate colorectal surgery evaluation.  Continue serial hemoglobin checks.  Stat CTA if bleeding picks up.   -  capsule study today (currently hemoglobin appears stable) .  Will review results tomorrow and then decide further discharge plan    -Transfuse as necessary (PRBC- 2/11)     Coronary artery disease s/p PCI in 2016  Hypertension  Hyperlipidemia  -Continue to hold prior to admission aspirin  -Continue hold Lipitor, amlodipine and losartan given labile blood pressure and concerns for volume depletion from potential rebleed     Type 2 Diabetes mellitus  -PTA on Glimepiride and Metformin as an outpatient.  -Hold oral anti-diabetic medications  -High SS insulin while hospitalized.     Diet: Advance Diet as Tolerated: Full Liquid Diet    DVT Prophylaxis: Pneumatic Compression Devices  Maguire Catheter: Not  "present  Central Lines: None  Cardiac Monitoring: None  Code Status: Full Code      Disposition Plan   Expected Discharge: 02/15/2022     Anticipated discharge location:  Awaiting care coordination huddle  Delays:         The patient's care was discussed with the Bedside Nurse, Patient and Patient's Family.    Nickolas Mark MD, MD  Hospitalist Service  [facility]  Securely message with the Vocera Web Console (learn more here)  Text page via [employer] Paging/Directory         Clinically Significant Risk Factors Present on Admission                    ______________________________________________________________________    Interval History   No further episodes of bleeding   denies any chest pain/shortness of breath/palpitations.   Had pill cam this morning  4 point ROS done and neg unless mentioned    Data reviewed today: I reviewed all medications, new labs and imaging results over the last 24 hours. I personally reviewed no images or EKG's today.    Physical Exam   BP (!) 146/70 (BP Location: Left arm)   Pulse 71   Temp 98.1  F (36.7  C) (Oral)   Resp 16   Ht 1.753 m (5' 9\")   Wt 83.7 kg (184 lb 8.4 oz)   SpO2 100%   BMI 27.25 kg/m    Gen- pleasant lying in bed  HEENT- NAD, PARDEEP  CVS- I+II+ no m/r/g  RS- CTAB  Abdo- soft, no tenderness . No g/r/r  Ext- no edema        Data    EXAM: NM GI BLEED   LOCATION: [facility]   DATE/TIME: 2/10/2022 2:06 PM   IMPRESSION:     Negative for gastrointestinal hemorrhage.    EXAM: CTA ABDOMEN PELVIS WITH CONTRAST   LOCATION: [facility]   DATE/TIME: 2/10/2022 4:15 AM     IMPRESSION:   1.  No CT evidence of ongoing GI bleed.     2.  Mild aneurysmal dilatation of the infrarenal abdominal aorta.     3.  Mild tree-in-bud nodular infiltrates in the left lung base consistent with infectious or inflammatory lower airways process.    BMP  Recent Labs   Lab 02/14/22  1127 02/14/22  0805 02/14/22  0407 " 02/13/22  2121 02/10/22  0830 02/10/22  0408   NA  --   --   --   --   --  137   POTASSIUM  --   --   --   --   --  4.1   CHLORIDE  --   --   --   --   --  106   VALENTIN  --   --   --   --   --  8.4*   CO2  --   --   --   --   --  25   BUN  --   --   --   --   --  22   CR  --   --   --   --   --  1.00   * 175* 116* 158*   < > 253*    < > = values in this interval not displayed.     CBC  Recent Labs   Lab 02/14/22  0626 02/10/22  0740 02/10/22  0408   WBC  --   --  7.5   RBC  --   --  2.48*   HGB 8.9*   < > 7.3*   HCT  --   --  22.7*   MCV  --   --  92   MCH  --   --  29.4   MCHC  --   --  32.2   RDW  --   --  14.5   PLT  --   --  240    < > = values in this interval not displayed.     INR  Recent Labs   Lab 02/10/22  0408 02/08/22  1529   INR 1.01 1.25*     LFTs  Recent Labs   Lab 02/10/22  0408   ALKPHOS 40   AST 11   ALT 15   BILITOTAL 0.2   PROTTOTAL 6.4*   ALBUMIN 3.0*

## 2022-02-14 NOTE — PLAN OF CARE
Summary   Date & Time 2/13/2022   [id number]    Up abd lolis in room. Continues to have watery dark blood stools. When eliminating in the toilet. Stools are not flushed. Nurse examines characteristics.  Per patient statement the consistency of the stools have not changed since admission.  Npo after MN for follow up gi diagnostic testing in AM.  Patient accpeting  1900 hematocrit result 8.8

## 2022-02-14 NOTE — PROGRESS NOTES
"  GASTROENTEROLOGY PROGRESS NOTE     CC: Lower GI bleed     SUBJECTIVE: Patient report that he is doing well. He has completed the bowel prep last night for the pillcam study and noticed dark blood in stools a few times. No fresh red blood on the stool per patient. Denies nausea or vomiting. Denies dizziness or syncopal episodes.      OBJECTIVE:  General Appearance:  Resting quietly in bed. His spouse is at bedside.   BP (!) 142/68 (BP Location: Left arm)   Pulse 73   Temp 98.2  F (36.8  C) (Oral)   Resp 16   Ht 1.753 m (5' 9\")   Wt 83.7 kg (184 lb 8.4 oz)   SpO2 98%   BMI 27.25 kg/m    Temp (24hrs), Av.2  F (36.8  C), Min:98.2  F (36.8  C), Max:98.2  F (36.8  C)    Patient Vitals for the past 72 hrs:   Weight   22 0543 83.7 kg (184 lb 8.4 oz)       Intake/Output Summary (Last 24 hours) at 2022 0913  Last data filed at 2022 0200  Gross per 24 hour   Intake 960 ml   Output 700 ml   Net 260 ml        PHYSICAL EXAM     PHYSICAL EXAM:  General: alert, oriented, NAD  SKIN: no suspicious lesions, rashes, jaundice, or spider angiomas  EYES: No scleral icterus  RESPIRATORY: Non labored breathing, Lungs clear  CARDIOVASCULAR:  RRR. No murmurs, clicks gallops or rub  GASTROINTESTINAL: Active bowel sounds, abdomen soft and non tender.  JOINT/EXTREMITIES: extremities normal- no gross deformities noted, gait normal and normal muscle tone  NEURO: Reflexes grossly normal and symmetric. Sensation grossly WNL.  PSYCH: no abnormal anxiety/depression     Additional Comments:  ROS, FH, SH: See initial GI consult for details.     I have reviewed the patient's new clinical lab results:     Recent Labs   Lab Test 22  0626 22  1914 22  0527 02/10/22  0740 02/10/22  0408 22  1529 22  1250 22  0715 22  1145 22  1040 22  0334 22  1948   WBC  --   --   --   --  7.5  --   --  9.7  --  13.7*  --  14.4*   HGB 8.9* 8.8* 8.3*   < > 7.3*  --    < > 8.7*  8.7*   < " > 7.0*  7.0*  7.0*   < > 6.2*   MCV  --   --   --   --  92  --   --  90  --  91  --  94   PLT  --   --   --   --  240  --   --  162  --  144*  --  190   INR  --   --   --   --  1.01 1.25*  --   --   --   --   --  1.29*    < > = values in this interval not displayed.     Recent Labs   Lab Test 02/10/22  0408 02/05/22  1040 02/04/22 1948   POTASSIUM 4.1 3.8 3.8   CHLORIDE 106 109 107   CO2 25 24 26   BUN 22 14 12   ANIONGAP 6 4 3     Recent Labs   Lab Test 02/10/22  0408 02/04/22  1948 02/03/22  0736 06/24/20  0746 09/06/19  1114 08/31/18  0836 08/18/17  0758 08/18/17  0757   ALBUMIN 3.0* 2.9* 3.4   < > 4.5  --    < >  --    BILITOTAL 0.2 0.2 0.3   < > 0.5  --    < >  --    ALT 15 17 21   < > 24  --    < >  --    AST 11 12 11   < > 15  --    < >  --    PROTEIN  --   --   --   --  Negative Negative  --  Negative   LIPASE  --   --  85  --   --   --   --   --     < > = values in this interval not displayed.        Imaging and procedures:   CT angiogram: 2/10/22  IMPRESSION:  1.  No CT evidence of ongoing GI bleed.  2.  Mild aneurysmal dilatation of the infrarenal abdominal aorta.  3.  Mild tree-in-bud nodular infiltrates in the left lung base consistent with infectious or inflammatory lower airways process.     NM GI bleed: 2/10/22  IMPRESSION:   Negative for gastrointestinal hemorrhage     Sigmoidoscopy on 2/10/22:   Impression:             - Preparation of the colon was poor.   - External hemorrhoids found on perianal exam.  - Diverticulosis in the sigmoid colon.  - Large volume clotted blood in the sigmoid and descending colon. No active bleeding or fresh blood seen.   - No specimens collected.      Active Problems:  Gastrointestinal hemorrhage, unspecified gastrointestinal hemorrhage type  Anemia due to blood loss, acute     Assessment: Jakub Robles is a 71 year old male who presents with recurrent rectal bleeding.  He presented to ED on 2/10/22 after passage of large amount of blood per rectum. This is his  third hospitalization within last 10 days for recurrent rectal bleeding. He has had 2 upper endoscopies with mild adenitis and no clear source of bleeding and colonoscopy showing sigmoid diverticulosis with blood products noted in the left colon primarily with no blood products in ascending colon and TI on the colonoscopy exam.  He also had a colonoscopy outpatient screening exam in August where multiple polyps were seen and again sigmoid diverticulosis noted.  He is not on any anticoagulation therapy.      CT angio scan was completed in the ER which did not reveal any active bleeding. He was admitted for acute blood loss anemia and received blood. NM scan did not show active bleeding. Sigmoidoscopy showed clotted blood no active bleeding.       Plan:     - Pillcam started today.   - Patient swallowed the capsule without any difficulty.     GI FOLLOWUP WITH CAPSULE PLACEMENT  -Small bowel video study in progress  -Capsule placed at 8:45 (this is an 12 hour study).  -Resume morning medications at 9:15 (start at time of clear liquids).   -NPO until 10:30, then clear liquids at 12:30, then advance diet as tolerated.   -Encourage ambulation to help facilitate capsule movement.  -Please remove equipment at 9:00pm and turn off machine. To turn off machine, hold down small black rubber button on left panel of machine.   -Capsule should pass in 24-48 hours, no need to retrieve.  -NO MRI UNTIL FOR 3 DAYS.   -If machine light on upper right corner blinks red (should be blue), please call our office at [phone].     Nancy Keys PA-C helped with the initiation of the pillcam study.  Time spent: 30 minutes, greater than 50% of the visit was spent in counseling/coordination of care.     Tavia Avalos CNP  [facility] ([facility])  [phone]

## 2022-02-14 NOTE — PROGRESS NOTES
COLON & RECTAL SURGERY  PROGRESS NOTE    February 14, 2022    SUBJECTIVE:  Feeling well. Started pillcam study this AM. At beginning of bowel prep he noted some dark/old blood with BMs, toward end of prep stools were normal in color with no blood. Denies pain. Hgb stable over the weekend, 8.9 today.     OBJECTIVE:  Temp:  [98.2  F (36.8  C)] 98.2  F (36.8  C)  Pulse:  [72-80] 73  Resp:  [16-18] 16  BP: (140-142)/(68-79) 142/68  SpO2:  [98 %-100 %] 98 %    Intake/Output Summary (Last 24 hours) at 2/14/2022 1124  Last data filed at 2/14/2022 0200  Gross per 24 hour   Intake 960 ml   Output 700 ml   Net 260 ml       GENERAL:  Awake, alert, no acute distress  HEAD: Normocephalic atraumatic  SCLERA: Anicteric  EXTREMITIES: Warm and well perfused  ABDOMEN:  Soft, non-tender, non-distended. No guarding, rigidity, or peritoneal signs.       LABS:  Lab Results   Component Value Date    WBC 7.5 02/10/2022    WBC 5.8 09/06/2019     Lab Results   Component Value Date    HGB 8.9 02/14/2022    HGB 13.0 09/06/2019     Lab Results   Component Value Date    HCT 22.7 02/10/2022    HCT 36.9 09/06/2019     Lab Results   Component Value Date     02/10/2022     09/06/2019     Last Basic Metabolic Panel:  Lab Results   Component Value Date     02/10/2022     06/24/2020      Lab Results   Component Value Date    POTASSIUM 4.1 02/10/2022    POTASSIUM 4.2 06/24/2020     Lab Results   Component Value Date    CHLORIDE 106 02/10/2022    CHLORIDE 103 06/24/2020     Lab Results   Component Value Date    VALENTIN 8.4 02/10/2022    VALENTIN 9.0 06/24/2020     Lab Results   Component Value Date    CO2 25 02/10/2022    CO2 27 06/24/2020     Lab Results   Component Value Date    BUN 22 02/10/2022    BUN 17 06/24/2020     Lab Results   Component Value Date    CR 1.00 02/10/2022    CR 0.81 06/24/2020     Lab Results   Component Value Date     02/14/2022     02/10/2022     06/24/2020       ASSESSMENT/PLAN: 71 year old  man with recurrent blood per rectum thought to be sigmoid diverticular bleed.  However, no clear source of bleeding after EGD x2, colonoscopy, CTA, tagged RBC scan. Has required multiple units of blood. No active bleeding and Hgb stable since Friday. Currently doing pillcam study.    1. Diet per GI - clear liquids until 12:30, then advance as tolerated. Would not advance past full liquids for now.  2. Monitor Hgb, transfuse <7  3. Monitor stool output for blood    Discussed with Dr. Cherry.    For questions/paging, please contact the CRS office at [phone].    Alen Lara PA-C  Colorectal Physician Assistant    [facility]  [address] [address]  T: [phone]  F: [phone]

## 2022-02-15 VITALS
RESPIRATION RATE: 18 BRPM | HEART RATE: 66 BPM | TEMPERATURE: 97.7 F | BODY MASS INDEX: 25.96 KG/M2 | WEIGHT: 175.27 LBS | DIASTOLIC BLOOD PRESSURE: 63 MMHG | HEIGHT: 69 IN | SYSTOLIC BLOOD PRESSURE: 124 MMHG | OXYGEN SATURATION: 100 %

## 2022-02-15 LAB
ANION GAP SERPL CALCULATED.3IONS-SCNC: 8 MMOL/L (ref 3–14)
BUN SERPL-MCNC: 6 MG/DL (ref 7–30)
CALCIUM SERPL-MCNC: 8.5 MG/DL (ref 8.5–10.1)
CHLORIDE BLD-SCNC: 99 MMOL/L (ref 94–109)
CO2 SERPL-SCNC: 24 MMOL/L (ref 20–32)
CREAT SERPL-MCNC: 0.85 MG/DL (ref 0.66–1.25)
GFR SERPL CREATININE-BSD FRML MDRD: >90 ML/MIN/1.73M2
GLUCOSE BLD-MCNC: 277 MG/DL (ref 70–99)
GLUCOSE BLDC GLUCOMTR-MCNC: 172 MG/DL (ref 70–99)
GLUCOSE BLDC GLUCOMTR-MCNC: 184 MG/DL (ref 70–99)
GLUCOSE BLDC GLUCOMTR-MCNC: 208 MG/DL (ref 70–99)
HGB BLD-MCNC: 8.9 G/DL (ref 13.3–17.7)
HGB BLD-MCNC: 9.3 G/DL (ref 13.3–17.7)
MAGNESIUM SERPL-MCNC: 1.8 MG/DL (ref 1.8–2.6)
POTASSIUM BLD-SCNC: 4.1 MMOL/L (ref 3.4–5.3)
SODIUM SERPL-SCNC: 131 MMOL/L (ref 133–144)

## 2022-02-15 PROCEDURE — 36415 COLL VENOUS BLD VENIPUNCTURE: CPT | Performed by: NURSE PRACTITIONER

## 2022-02-15 PROCEDURE — 250N000011 HC RX IP 250 OP 636: Performed by: HOSPITALIST

## 2022-02-15 PROCEDURE — 36415 COLL VENOUS BLD VENIPUNCTURE: CPT | Performed by: INTERNAL MEDICINE

## 2022-02-15 PROCEDURE — 85018 HEMOGLOBIN: CPT | Performed by: INTERNAL MEDICINE

## 2022-02-15 PROCEDURE — 85018 HEMOGLOBIN: CPT | Performed by: NURSE PRACTITIONER

## 2022-02-15 PROCEDURE — 83735 ASSAY OF MAGNESIUM: CPT | Performed by: INTERNAL MEDICINE

## 2022-02-15 PROCEDURE — C9113 INJ PANTOPRAZOLE SODIUM, VIA: HCPCS | Performed by: HOSPITALIST

## 2022-02-15 PROCEDURE — 99239 HOSP IP/OBS DSCHRG MGMT >30: CPT | Performed by: INTERNAL MEDICINE

## 2022-02-15 PROCEDURE — 82310 ASSAY OF CALCIUM: CPT | Performed by: INTERNAL MEDICINE

## 2022-02-15 RX ADMIN — PANTOPRAZOLE SODIUM 40 MG: 40 INJECTION, POWDER, FOR SOLUTION INTRAVENOUS at 08:12

## 2022-02-15 ASSESSMENT — ACTIVITIES OF DAILY LIVING (ADL)
ADLS_ACUITY_SCORE: 3

## 2022-02-15 ASSESSMENT — MIFFLIN-ST. JEOR: SCORE: 1545.38

## 2022-02-15 NOTE — PROGRESS NOTES
"  GASTROENTEROLOGY PROGRESS NOTE     CC: Lower GI bleed     SUBJECTIVE: Patient report that he is doing well. Denies nausea or vomiting. Denies dizziness or syncopal episodes. Denies blood stools. Tolerating low-fiber diet.    On a another note, patient reports lower extremity aches. No redness or swelling noted on the lower legs.   OBJECTIVE:  General Appearance:  Resting quietly in bed. His spouse is at bedside.   BP (!) 147/74 (BP Location: Left arm, Patient Position: Semi-Lopez's)   Pulse 73   Temp 97.8  F (36.6  C) (Oral)   Resp 18   Ht 1.753 m (5' 9\")   Wt 79.5 kg (175 lb 4.3 oz)   SpO2 99%   BMI 25.88 kg/m    Temp (24hrs), Av.2  F (36.8  C), Min:98.2  F (36.8  C), Max:98.2  F (36.8  C)    Patient Vitals for the past 72 hrs:   Weight   02/15/22 0622 79.5 kg (175 lb 4.3 oz)       Intake/Output Summary (Last 24 hours) at 2022 0913  Last data filed at 2022 0200  Gross per 24 hour   Intake 960 ml   Output 700 ml   Net 260 ml        PHYSICAL EXAM  General: alert, oriented, NAD  SKIN: no suspicious lesions, rashes, jaundice, or spider angiomas  EYES: No scleral icterus  RESPIRATORY: Non labored breathing, Lungs clear  CARDIOVASCULAR:  RRR. No murmurs, clicks gallops or rub  GASTROINTESTINAL: Active bowel sounds, abdomen soft and non tender.  JOINT/EXTREMITIES: extremities normal- no gross deformities noted. No edema or redness noted.   NEURO: Reflexes grossly normal and symmetric. Sensation grossly WNL.  PSYCH: no abnormal anxiety/depression     Additional Comments:  ROS, FH, SH: See initial GI consult for details.     I have reviewed the patient's new clinical lab results:     Recent Labs   Lab Test 02/15/22  0658 22  1811 22  0626 02/10/22  0740 02/10/22  0408 22  1529 22  1250 22  0715 22  1145 22  1040 22  0334 22  1948   WBC  --   --   --   --  7.5  --   --  9.7  --  13.7*  --  14.4*   HGB 8.9* 9.4* 8.9*   < > 7.3*  --    < > 8.7*  " 8.7*   < > 7.0*  7.0*  7.0*   < > 6.2*   MCV  --   --   --   --  92  --   --  90  --  91  --  94   PLT  --   --   --   --  240  --   --  162  --  144*  --  190   INR  --   --   --   --  1.01 1.25*  --   --   --   --   --  1.29*    < > = values in this interval not displayed.     Recent Labs   Lab Test 02/10/22  0408 02/05/22  1040 02/04/22 1948   POTASSIUM 4.1 3.8 3.8   CHLORIDE 106 109 107   CO2 25 24 26   BUN 22 14 12   ANIONGAP 6 4 3     Recent Labs   Lab Test 02/10/22  0408 02/04/22  1948 02/03/22  0736 06/24/20  0746 09/06/19  1114 08/31/18  0836 08/18/17  0758 08/18/17  0757   ALBUMIN 3.0* 2.9* 3.4   < > 4.5  --    < >  --    BILITOTAL 0.2 0.2 0.3   < > 0.5  --    < >  --    ALT 15 17 21   < > 24  --    < >  --    AST 11 12 11   < > 15  --    < >  --    PROTEIN  --   --   --   --  Negative Negative  --  Negative   LIPASE  --   --  85  --   --   --   --   --     < > = values in this interval not displayed.        Imaging and procedures:   CT angiogram: 2/10/22  IMPRESSION:  1.  No CT evidence of ongoing GI bleed.  2.  Mild aneurysmal dilatation of the infrarenal abdominal aorta.  3.  Mild tree-in-bud nodular infiltrates in the left lung base consistent with infectious or inflammatory lower airways process.     NM GI bleed: 2/10/22  IMPRESSION:   Negative for gastrointestinal hemorrhage     Sigmoidoscopy on 2/10/22:   Impression:             - Preparation of the colon was poor.   - External hemorrhoids found on perianal exam.  - Diverticulosis in the sigmoid colon.  - Large volume clotted blood in the sigmoid and descending colon. No active bleeding or fresh blood seen.   - No specimens collected.      Active Problems:  Gastrointestinal hemorrhage, unspecified gastrointestinal hemorrhage type  Anemia due to blood loss, acute     Assessment: Jakub M Plekkenpol is a 71 year old male who presents with recurrent rectal bleeding.  He presented to ED on 2/10/22 after passage of large amount of blood per rectum.  This is his third hospitalization within last 10 days for recurrent rectal bleeding. He has had 2 upper endoscopies with mild adenitis and no clear source of bleeding and colonoscopy showing sigmoid diverticulosis with blood products noted in the left colon primarily with no blood products in ascending colon and TI on the colonoscopy exam.  He also had a colonoscopy outpatient screening exam in August where multiple polyps were seen and again sigmoid diverticulosis noted.  He is not on any anticoagulation therapy.      CT angio scan was completed in the ER which did not reveal any active bleeding. He was admitted for acute blood loss anemia and received blood. NM scan did not show active bleeding. Sigmoidoscopy showed clotted blood no active bleeding. Hgb is at 8.9.     Plan:   - Continue on low fiber diet.   - Pillcam sent to [facility] path for stat read.   - Will check electrolytes to if low electrolytes is contributing to leg cramps.   - Discharge to be determined once based on his tolerance to low-fiber diet and regular bowel movement without bleeding. Hopefully tomorrow. I will defer this to the attending.     Time spent: 30 minutes, greater than 50% of the visit was spent in counseling/coordination of care.     Tavia Avalos, CNP  [facility] ([facility])  [phone]

## 2022-02-15 NOTE — PLAN OF CARE
AOx4, VSS on RA. Up ad lolis in rm. Toelrates low fiber diet. Camera removed and powered off at 2115 hrs last night. , 172.

## 2022-02-15 NOTE — PLAN OF CARE
A&Ox4. VSS on RA. Denies chest pain and SOB. No signs of GI bleed this shift. Had watery yellow stool this shift. Hgb 9.4 at 1800. Tolerating low fiber diet. Up independent in the room. Voiding. Had pill camera this morning around 0900. Plan is remove all equipment (box and belt) at 1200 tonight. Pt aware of the plan. GI and colorectal following.

## 2022-02-15 NOTE — PROVIDER NOTIFICATION
Dr. Mark paged at [phone] for      Pt c/o bilateral leg cramping w/ ambulation, requesting electrolytes be checked.

## 2022-02-15 NOTE — PLAN OF CARE
AxOx4. VSS on Rm air. Denies pain. Abdomen slightly distended, states it is non-tender. No reports of bloody BM this shift.  and 208. Sliding scale insulin administered per orders. Hgb 8.9. Electrolytes checked and remain stable. Possible discharge this evening if stat Hgb stable.

## 2022-02-15 NOTE — PROVIDER NOTIFICATION
Dr Mark paged at [phone] for    FYI: GI said Pt is okay to discharge if the STAT hgb remains stable.

## 2022-02-15 NOTE — PLAN OF CARE
A&O x4, up independently .VSS on RA. Patient denies pain. PIV discontinued. Belongings returned. AVS reviewed with patient and verbalized understanding . Patient discharge home with family.

## 2022-02-15 NOTE — DISCHARGE SUMMARY
[facility]  Hospitalist Discharge Summary      Date of Admission:  2/10/2022  Date of Discharge:  2/15/2022  Discharging Provider: Nickolas Mark MD, MD  Discharge Service: Hospitalist Service    Discharge Diagnoses     Suspected recurrent acute sigmoid divertrcular bleeding causing acute blood loss anemia    Coronary artery disease s/p PCI in 2016  Hypertension  Hyperlipidemia    Type 2 Diabetes mellitus    Follow-ups Needed After Discharge   Follow-up Appointments     Follow-up and recommended labs and tests       Follow up with primary care provider, Markel Kauffman, within 7 days to   evaluate medication change.  The following labs/tests are recommended:   CBC.         {  Unresulted Labs Ordered in the Past 30 Days of this Admission     No orders found from 1/11/2022 to 2/11/2022.        Discharge Disposition   Discharged to home  Condition at discharge: Stable    Hospital Course      Jakub Robles is a markedly pleasant 71 year old gentleman with past medical history that is most notable for CAD and Type 2 Diabetes mellitus, who was recently admitted here for GI bleeding causing acute blood loss anemia from 2/3/22 to 2/4/22, and re-admitted from 2/4/22 to 2/8/22 for recurrent bleeding, and now returns for recurrent hematochezia suspected due to acute diverticular bleeding.     Suspected recurrent acute sigmoid divertrcular bleeding causing acute blood loss anemia  -This is his third hospitalization in the last couple of weeks for recurrent GI bleed  -Given the nature of the bleed high suspicion for diverticular bleed  -Hemoglobin at the time of discharge was stable around 8.5-now, at presentation on 2/10 lower again at 7.2  -Appreciate [facility] reconsulted, flexible sigmoidoscopy completed on 2/10 which revealed diverticulosis and large volume of clotted blood in the sigmoid and descending colon.  No active bleeding or fresh blood.    -Appreciate colorectal surgery evaluation.   Continued serial hemoglobin checks which remained stable. Also had capsule study :No significant findings noted on the pillcam. No source of bleeding so he was discharged home     -Transfuse as necessary (PRBC- 2/11)     Coronary artery disease s/p PCI in 2016  Hypertension  Hyperlipidemia  -Continued to hold prior to admission aspirin  -Continued hold Lipitor, amlodipine and losartan given labile blood pressure and concerns for volume depletion from potential rebleed     Type 2 Diabetes mellitus  -PTA on Glimepiride and Metformin as an outpatient. Resumed on discharge  -Held oral anti-diabetic medications  -High SS insulin while hospitalized.    Consultations This Hospital Stay   GASTROENTEROLOGY IP CONSULT  COLORECTAL SURGERY IP CONSULT    Code Status   Full Code    Time Spent on this Encounter   I, Nickolas Mark MD, MD, personally saw the patient today and spent greater than 30 minutes discharging this patient.       Nickolas Mark MD, MD  [facility]  [address] [place] [address]  Phone: [phone]  Fax: [phone]  ______________________________________________________________________    Physical Exam   Vital Signs: Temp: 97.8  F (36.6  C) Temp src: Oral BP: (!) 147/74 Pulse: 73   Resp: 18 SpO2: 99 % O2 Device: None (Room air)    Weight: 175 lbs 4.25 oz  Gen- pleasant lying in bed  HEENT- NAD, PARDEEP  CVS- I+II+ no m/r/g  RS- CTAB  Abdo- soft, no tenderness . No g/r/r  Ext- no edema        Primary Care Physician   Markel Kauffman    Discharge Orders      Reason for your hospital stay    Jakub Robles is a markedly pleasant 71 year old gentleman with past medical history that is most notable for CAD and Type 2 Diabetes mellitus, who was recently admitted here for GI bleeding causing acute blood loss anemia from 2/3/22 to 2/4/22, and re-admitted from 2/4/22 to 2/8/22 for recurrent bleeding, and now returns for recurrent hematochezia suspected due to acute  diverticular bleeding.     Follow-up and recommended labs and tests     Follow up with primary care provider, Markel Kauffman, within 7 days to evaluate medication change.  The following labs/tests are recommended: CBC.     Activity    Your activity upon discharge: activity as tolerated     Diet    Follow this diet upon discharge: Orders Placed This Encounter      Advance Diet as Tolerated: Low Fiber       Significant Results and Procedures   Results for orders placed or performed during the hospital encounter of 02/10/22   CTA Abdomen Pelvis with Contrast    Narrative    EXAM: CTA ABDOMEN PELVIS WITH CONTRAST  LOCATION: [facility]  DATE/TIME: 2/10/2022 4:15 AM    INDICATION: GI bleed  COMPARISON: CT from 02/03/2022.  TECHNIQUE: CT angiogram abdomen pelvis during arterial phase of injection of IV contrast. 2D and 3D MIP reconstructions were performed by the CT technologist. Dose reduction techniques were used.  CONTRAST: 119mL Isovue 370.    FINDINGS:  ANGIOGRAM ABDOMEN/PELVIS: The noncontrast images demonstrate moderate to severe aortoiliac atherosclerotic change with a 3.2 cm infrarenal abdominal aortic aneurysm. Following the administration of IV contrast, the abdominal aorta is patent. The celiac   artery is narrowed proximally by extrinsic compression from the median arcuate ligament of the diaphragm, though patent. The superior mesenteric artery is patent. The renal and inferior mesenteric arteries are patent. There is no active intraluminal   arterial phase extravasation of contrast within the GI tract. There is no progressive intraluminal pooling of contrast in the venous phase images.    LOWER CHEST: A few tree-in-bud nodular infiltrates in the left lung base.    HEPATOBILIARY: Normal.    PANCREAS: Normal.    SPLEEN: Normal.    ADRENAL GLANDS: Normal.    KIDNEYS/BLADDER: Normal.    BOWEL: No mechanical bowel obstruction. No free air. No appendicitis.    LYMPH NODES: Multiple  subcentimeter short axis lymph nodes throughout the central mesentery.    PELVIC ORGANS: Normal.    MUSCULOSKELETAL: Lumbar spine degenerative disc height loss.      Impression    IMPRESSION:  1.  No CT evidence of ongoing GI bleed.    2.  Mild aneurysmal dilatation of the infrarenal abdominal aorta.    3.  Mild tree-in-bud nodular infiltrates in the left lung base consistent with infectious or inflammatory lower airways process.   NM GI Bleed    Narrative    EXAM: NM GI BLEED  LOCATION: [facility]  DATE/TIME: 2/10/2022 2:06 PM    INDICATION: GI bleed. Melanoma.  COMPARISON: CT of the abdomen pelvis dated 02/10/2022.  TECHNIQUE: 27.0 mCi technetium-99m, in vitro tagged RBCs, IV. Dynamic anterior planar imaging of the abdomen for 65 minutes.    FINDINGS: Normal background activity without scintigraphic evidence of gastrointestinal bleeding during the imaging period.      Impression    IMPRESSION:    Negative for gastrointestinal hemorrhage.       Discharge Medications   Current Discharge Medication List      CONTINUE these medications which have NOT CHANGED    Details   acetaminophen (TYLENOL) 325 MG tablet Take 2 tablets (650 mg) by mouth every 6 hours as needed for mild pain or other (and adjunct with moderate or severe pain or per patient request)  Refills: 0    Associated Diagnoses: Pain      amLODIPine (NORVASC) 5 MG tablet TAKE 1 TABLET EVERY DAY  Qty: 90 tablet, Refills: 1    Associated Diagnoses: Essential hypertension with goal blood pressure less than 140/90      atorvastatin (LIPITOR) 40 MG tablet TAKE 1 TABLET EVERY DAY  Qty: 90 tablet, Refills: 3    Associated Diagnoses: Hyperlipidemia LDL goal <70      ferrous sulfate (FEROSUL) 325 (65 Fe) MG tablet Take 1 tablet (325 mg) by mouth daily (with breakfast)  Qty: 60 tablet, Refills: 1    Associated Diagnoses: Acute blood loss anemia      glimepiride (AMARYL) 2 MG tablet TAKE 1 TABLET EVERY MORNING BEFORE BREAKFAST (NEED 6 MONTH LAB  TO CHECK A1C AND OFFICE VISIT BEFORE MORE REFILLS)  Qty: 90 tablet, Refills: 3    Associated Diagnoses: Type 2 diabetes mellitus with diabetic nephropathy, without long-term current use of insulin (H)      metFORMIN (GLUCOPHAGE) 1000 MG tablet Take 1 tablet (1,000 mg) by mouth 2 times daily (with meals)  Qty: 180 tablet, Refills: 3    Associated Diagnoses: Type 2 diabetes mellitus with diabetic nephropathy, without long-term current use of insulin (H)      nitroGLYcerin (NITROSTAT) 0.4 MG sublingual tablet Place 0.4 mg under the tongue as needed for chest pain       pantoprazole (PROTONIX) 40 MG EC tablet Take 1 tablet (40 mg) by mouth 2 times daily (before meals)  Qty: 60 tablet, Refills: 1    Associated Diagnoses: Duodenitis with bleeding      sildenafil (VIAGRA) 100 MG tablet Take 1 tablet (100 mg) by mouth daily as needed (erectile dysfunction)  Qty: 30 tablet, Refills: 3    Associated Diagnoses: Erectile dysfunction, unspecified erectile dysfunction type      vitamin C (ASCORBIC ACID) 250 MG tablet Take 2 tablets (500 mg) by mouth daily  Qty: 60 tablet, Refills: 0    Comments: Take with Iron to improve absorption.  Associated Diagnoses: Acute blood loss anemia      ACCU-CHEK RYAN PLUS test strip USE TO TEST BLOOD SUGAR TWICE DAILY  Qty: 200 strip, Refills: 1    Associated Diagnoses: Type 2 diabetes mellitus with diabetic neuropathy (H)      blood glucose monitoring (ACCU-CHEK MULTICLIX) lancets by In Vitro route 2 times daily  Qty: 2 Box, Refills: 1    Comments: 200 each  Associated Diagnoses: Type 2 diabetes mellitus with diabetic neuropathy (H)         STOP taking these medications       losartan (COZAAR) 100 MG tablet Comments:   Reason for Stopping:             Allergies   No Known Allergies

## 2022-02-15 NOTE — PROGRESS NOTES
COLON & RECTAL SURGERY  PROGRESS NOTE    February 15, 2022    SUBJECTIVE:  Pillcam study done yesterday.  Hgb 8.9 down from 9.4 yesterday.  Passed some old dark blood at the beginning of the prep but stools were brown and without blood for the remainder of the prep.  No abdominal pain.  Reporting some leg camps.  Tolerating full liquid diet.     OBJECTIVE:  Temp:  [97.6  F (36.4  C)-98.1  F (36.7  C)] 97.8  F (36.6  C)  Pulse:  [71-77] 73  Resp:  [16-18] 18  BP: (133-147)/(64-74) 147/74  SpO2:  [98 %-100 %] 99 %    Intake/Output Summary (Last 24 hours) at 2/15/2022 0933  Last data filed at 2/15/2022 0823  Gross per 24 hour   Intake 640 ml   Output --   Net 640 ml       GENERAL:  Awake, alert, no acute distress, resting in bed, appears comfortable   RESPIRATORY: unlabored breathing on room air       LABS:  Lab Results   Component Value Date    WBC 7.5 02/10/2022    WBC 5.8 09/06/2019     Lab Results   Component Value Date    HGB 8.9 02/15/2022    HGB 13.0 09/06/2019     Lab Results   Component Value Date    HCT 22.7 02/10/2022    HCT 36.9 09/06/2019     Lab Results   Component Value Date     02/10/2022     09/06/2019     Last Basic Metabolic Panel:  Lab Results   Component Value Date     02/10/2022     06/24/2020      Lab Results   Component Value Date    POTASSIUM 4.1 02/10/2022    POTASSIUM 4.2 06/24/2020     Lab Results   Component Value Date    CHLORIDE 106 02/10/2022    CHLORIDE 103 06/24/2020     Lab Results   Component Value Date    VALENTIN 8.4 02/10/2022    VALENTIN 9.0 06/24/2020     Lab Results   Component Value Date    CO2 25 02/10/2022    CO2 27 06/24/2020     Lab Results   Component Value Date    BUN 22 02/10/2022    BUN 17 06/24/2020     Lab Results   Component Value Date    CR 1.00 02/10/2022    CR 0.81 06/24/2020     Lab Results   Component Value Date     02/15/2022     02/10/2022     06/24/2020       ASSESSMENT/PLAN: 71 year old man with recurrent blood per  rectum thought to be sigmoid diverticular bleed.  However, no clear source of bleeding after EGD x2, colonoscopy, CTA, tagged RBC scan. Has required multiple units of blood. No active bleeding and Hgb stable.  Pillcam study done yesterday.  Awaiting results    - Diet per GI  - monitor hemoglobin, transfuse for <7  - monitor stool output for blood  - no plans for urgent surgical intervention at this time  - will discuss with Dr. Cherry         For questions/paging, please contact the CRS office at [phone].    Elba Castro PA-C  [facility]  Phone: [phone]    CRS Staff.  Seen and examined independently.  Agree with above.    Doing well.  No further bleeding.  Tolerating low fiber diet.  I would recommend low fiber diet for 2 weeks, then slowly increase fiber intake.  The evidence for this is poor, but given his history of readmissions, it seems reasonable.    I performed a history and physical examination of the patient and discussed their management with the physician assistant. I reviewed the physician assistants note and agree with the documented findings and plan of care.     Chris Cherry MD FACS FASCRS  Colorectal Surgeon       [facility]  [address], [address]  [place] [address]  T: [phone]  F: [phone]  Pager: [phone]  [email or web address]

## 2022-02-15 NOTE — PROGRESS NOTES
[facility]    Medicine Progress Note - Hospitalist Service    Date of Admission:  2/10/2022    Assessment & Plan        Jakub Robles is a markedly pleasant 71 year old gentleman with past medical history that is most notable for CAD and Type 2 Diabetes mellitus, who was recently admitted here for GI bleeding causing acute blood loss anemia from 2/3/22 to 2/4/22, and re-admitted from 2/4/22 to 2/8/22 for recurrent bleeding, and now returns for recurrent hematochezia suspected due to acute diverticular bleeding.     Suspected recurrent acute sigmoid divertrcular bleeding causing acute blood loss anemia  -This is his third hospitalization in the last couple of weeks for recurrent GI bleed  -Given the nature of the bleed high suspicion for diverticular bleed  -Hemoglobin at the time of discharge was stable around 8.5-now, at presentation on 2/10 lower again at 7.2  -Appreciate [facility] reconsulted, flexible sigmoidoscopy completed on 2/10 which revealed diverticulosis and large volume of clotted blood in the sigmoid and descending colon.  No active bleeding or fresh blood.    -Appreciate colorectal surgery evaluation.  Continue serial hemoglobin checks.  Stat CTA if bleeding picks up.   -Await capsule study results (currently hemoglobin appears stable) .  Will review results and then decide further discharge plan later today versus tomorrow.  Advance diet to low fiber    -Transfuse as necessary (PRBC- 2/11)     Coronary artery disease s/p PCI in 2016  Hypertension  Hyperlipidemia  -Continue to hold prior to admission aspirin  -Continue hold Lipitor, amlodipine and losartan given labile blood pressure and concerns for volume depletion from potential rebleed     Type 2 Diabetes mellitus  -PTA on Glimepiride and Metformin as an outpatient.  -Hold oral anti-diabetic medications  -High SS insulin while hospitalized.     Diet: Advance Diet as Tolerated: Low Fiber    DVT Prophylaxis:  "Pneumatic Compression Devices  Maguire Catheter: Not present  Central Lines: None  Cardiac Monitoring: None  Code Status: Full Code      Disposition Plan   Expected Discharge: 02/16/2022     Anticipated discharge location:  Awaiting care coordination huddle  Delays:         The patient's care was discussed with the Bedside Nurse, Patient and Patient's Family.    Nickolas Mark MD, MD  Hospitalist Service  [facility]  Securely message with the Vocera Web Console (learn more here)  Text page via [employer] Paging/Directory         Clinically Significant Risk Factors Present on Admission                    ______________________________________________________________________    Interval History   Doing well.  Denies any further bleeding  Waiting for test results from [facility]  4 point ROS done and neg unless mentioned    Data reviewed today: I reviewed all medications, new labs and imaging results over the last 24 hours. I personally reviewed no images or EKG's today.    Physical Exam   BP (!) 147/74 (BP Location: Left arm, Patient Position: Semi-Lopez's)   Pulse 73   Temp 97.8  F (36.6  C) (Oral)   Resp 18   Ht 1.753 m (5' 9\")   Wt 79.5 kg (175 lb 4.3 oz)   SpO2 99%   BMI 25.88 kg/m    Gen- pleasant lying in bed  HEENT- NAD, PARDEEP  CVS- I+II+ no m/r/g  RS- CTAB  Abdo- soft, no tenderness . No g/r/r  Ext- no edema        Data    EXAM: NM GI BLEED   LOCATION: [facility]   DATE/TIME: 2/10/2022 2:06 PM   IMPRESSION:     Negative for gastrointestinal hemorrhage.    EXAM: CTA ABDOMEN PELVIS WITH CONTRAST   LOCATION: [facility]   DATE/TIME: 2/10/2022 4:15 AM     IMPRESSION:   1.  No CT evidence of ongoing GI bleed.     2.  Mild aneurysmal dilatation of the infrarenal abdominal aorta.     3.  Mild tree-in-bud nodular infiltrates in the left lung base consistent with infectious or inflammatory lower airways process.    BMP  Recent Labs   Lab " 02/15/22  1122 02/15/22  0900 02/15/22  0621 02/15/22  0205 02/10/22  0830 02/10/22  0408   NA  --  131*  --   --   --  137   POTASSIUM  --  4.1  --   --   --  4.1   CHLORIDE  --  99  --   --   --  106   VALENTIN  --  8.5  --   --   --  8.4*   CO2  --  24  --   --   --  25   BUN  --  6*  --   --   --  22   CR  --  0.85  --   --   --  1.00   * 277* 184* 172*   < > 253*    < > = values in this interval not displayed.     CBC  Recent Labs   Lab 02/15/22  0658 02/10/22  0740 02/10/22  0408   WBC  --   --  7.5   RBC  --   --  2.48*   HGB 8.9*   < > 7.3*   HCT  --   --  22.7*   MCV  --   --  92   MCH  --   --  29.4   MCHC  --   --  32.2   RDW  --   --  14.5   PLT  --   --  240    < > = values in this interval not displayed.     INR  Recent Labs   Lab 02/10/22  0408 02/08/22  1529   INR 1.01 1.25*     LFTs  Recent Labs   Lab 02/10/22  0408   ALKPHOS 40   AST 11   ALT 15   BILITOTAL 0.2   PROTTOTAL 6.4*   ALBUMIN 3.0*

## 2022-02-15 NOTE — TELEPHONE ENCOUNTER
Pt called as well as Return [employer] message, to report he is being discharged from the hospital today. He has been in the hospital 3 times since the beginning of the month and had a f/u scheduled last week but was back in the hospital at that time. He is requesting a hospital f/u with Dr. Kauffman this Thursday, can we use a same day slot to schedule this?    Call back pt: [phone]  Ok to leave detailed vm.     Cristiana SOOD RN  [facility]

## 2022-02-15 NOTE — PROGRESS NOTES
Dr. Tushar Rashid from [facility] called me with the pillcam results. No significant findings noted on the pillcam. No source of bleeding identified.      - Discussed the results with the patient. Patient would like to go home provided the Hgb is stable.  - Advance to regular diet.   - Return to ED if bleeding recurrent bleeding.   - Patient would Hgb checked again before discharge. Stat hgb check ordered.     GI signing off.     Tavia Avalos CNP   [facility] ([facility])  [phone]

## 2022-02-16 PROCEDURE — 87338 HPYLORI STOOL AG IA: CPT | Performed by: INTERNAL MEDICINE

## 2022-02-16 NOTE — TELEPHONE ENCOUNTER
"Pt called back to check on this.  Put pt in at that 930 tomorrow morning as pt had his diabetic meds stopped and has been in the hospital 3x in a row, so really needs to be seen.    \    Hospital/TCU/ED for chronic condition Discharge Protocol    \"Hi, my name is Mecca Ortiz RN, a registered nurse, and I am calling from [facility].  I am calling to follow up and see how things are going for you after your recent emergency visit/hospital/TCU stay.\"    Tell me how you are doing now that you are home?\" questions about what to do from here med wise, and wants to see PCP sap    Discharge Instructions    \"Let's review your discharge instructions.  What is/are the follow-up recommendations?  Pt. Response: pcp visit asap for follow up - but discharge notes not complete yet     \"Has an appointment with your primary care provider been scheduled?\"   No (schedule appointment)    \"When you see the provider, I would recommend that you bring your medications with you.\"    Medications    \"Tell me what changed about your medicines when you discharged?\"    Changes to chronic meds?    2 or more - Epic MTM referral needed    \"What questions do you have about your medications?\"    Many - scheduled visit    New diagnoses of heart failure, COPD, diabetes, or MI?    No              Post Discharge Medication Reconciliation Status: discharge medications reconciled, continue medications without change. And Follow up with PCP for changes     Was MTM referral placed (*Make sure to put transitions as reason for referral)?   No - pt has CC involved, etc - will wait for PCP to order if appropriate    Call Summary    \"What questions or concerns do you have about your recent visit and your follow-up care?\"     none    \"If you have questions or things don't continue to improve, we encourage you contact us through the main clinic number (give number).  Even if the clinic is not open, triage nurses are available 24/7 to help you.     We " "would like you to know that our clinic has extended hours (provide information).  We also have urgent care (provide details on closest location and hours/contact info)\"      \"Thank you for your time and take care!\"             "

## 2022-02-17 ENCOUNTER — OFFICE VISIT (OUTPATIENT)
Dept: INTERNAL MEDICINE | Facility: CLINIC | Age: 72
End: 2022-02-17
Payer: MEDICARE

## 2022-02-17 ENCOUNTER — ANCILLARY PROCEDURE (OUTPATIENT)
Dept: GENERAL RADIOLOGY | Facility: CLINIC | Age: 72
End: 2022-02-17
Attending: INTERNAL MEDICINE
Payer: MEDICARE

## 2022-02-17 VITALS
TEMPERATURE: 97.5 F | OXYGEN SATURATION: 99 % | DIASTOLIC BLOOD PRESSURE: 62 MMHG | HEART RATE: 86 BPM | WEIGHT: 185 LBS | SYSTOLIC BLOOD PRESSURE: 124 MMHG | RESPIRATION RATE: 16 BRPM | HEIGHT: 69 IN | BODY MASS INDEX: 27.4 KG/M2

## 2022-02-17 DIAGNOSIS — Z12.5 SCREENING FOR PROSTATE CANCER: ICD-10-CM

## 2022-02-17 DIAGNOSIS — I10 ESSENTIAL HYPERTENSION: ICD-10-CM

## 2022-02-17 DIAGNOSIS — D64.9 ANEMIA, UNSPECIFIED TYPE: ICD-10-CM

## 2022-02-17 DIAGNOSIS — K29.80 DUODENITIS: ICD-10-CM

## 2022-02-17 DIAGNOSIS — R05.9 COUGH: ICD-10-CM

## 2022-02-17 DIAGNOSIS — K92.2 GASTROINTESTINAL HEMORRHAGE, UNSPECIFIED GASTROINTESTINAL HEMORRHAGE TYPE: ICD-10-CM

## 2022-02-17 DIAGNOSIS — I71.40 ABDOMINAL AORTIC ANEURYSM (AAA) WITHOUT RUPTURE (H): ICD-10-CM

## 2022-02-17 DIAGNOSIS — E11.21 TYPE 2 DIABETES MELLITUS WITH DIABETIC NEPHROPATHY, WITHOUT LONG-TERM CURRENT USE OF INSULIN (H): ICD-10-CM

## 2022-02-17 LAB
ERYTHROCYTE [DISTWIDTH] IN BLOOD BY AUTOMATED COUNT: 14.5 % (ref 10–15)
FERRITIN SERPL-MCNC: 168 NG/ML (ref 26–388)
HCT VFR BLD AUTO: 29.6 % (ref 40–53)
HGB BLD-MCNC: 9 G/DL (ref 13.3–17.7)
IRON SATN MFR SERPL: 17 % (ref 15–46)
IRON SERPL-MCNC: 46 UG/DL (ref 35–180)
MCH RBC QN AUTO: 28.6 PG (ref 26.5–33)
MCHC RBC AUTO-ENTMCNC: 30.4 G/DL (ref 31.5–36.5)
MCV RBC AUTO: 94 FL (ref 78–100)
PLATELET # BLD AUTO: 274 10E3/UL (ref 150–450)
RBC # BLD AUTO: 3.15 10E6/UL (ref 4.4–5.9)
TIBC SERPL-MCNC: 267 UG/DL (ref 240–430)
WBC # BLD AUTO: 5.9 10E3/UL (ref 4–11)

## 2022-02-17 PROCEDURE — 36415 COLL VENOUS BLD VENIPUNCTURE: CPT | Performed by: INTERNAL MEDICINE

## 2022-02-17 PROCEDURE — 99215 OFFICE O/P EST HI 40 MIN: CPT | Performed by: INTERNAL MEDICINE

## 2022-02-17 PROCEDURE — 85027 COMPLETE CBC AUTOMATED: CPT | Performed by: INTERNAL MEDICINE

## 2022-02-17 PROCEDURE — 71046 X-RAY EXAM CHEST 2 VIEWS: CPT | Performed by: RADIOLOGY

## 2022-02-17 PROCEDURE — 82728 ASSAY OF FERRITIN: CPT | Performed by: INTERNAL MEDICINE

## 2022-02-17 PROCEDURE — 83550 IRON BINDING TEST: CPT | Performed by: INTERNAL MEDICINE

## 2022-02-17 RX ORDER — GLIMEPIRIDE 2 MG/1
TABLET ORAL
Qty: 90 TABLET | Refills: 3 | COMMUNITY
Start: 2022-02-17 | End: 2022-10-07

## 2022-02-17 NOTE — PATIENT INSTRUCTIONS
Continue Pantoprazole 1 tab twice a day for 30 days. Then 1 tab daily in AM for acid suppression   Restart metformin 1000mg tab, 1/2 tab (500mg) at supper once a day  Restart Glimepiride 2mg tab, 1 tab daily in AM for diabetes   Talk with insurance/pharmacy cost of Jardiance 25mg tab, 1 tab daily for diabetes and let me know  Remain off of Aspirin for now  Remain off of ALL alcohol  CXR   Labs today as ordered   Continue low fiber diet for the next 2 weeks   H pylori stool test.  at lab and  drop off back at the clinic lab  Repeat Ultrasound of abdominal aorta in 1 year  Call [facility] at [phone] to schedule follow-up with them. Per prior discharge summary, be seen in their liver clinic  Call  [phone] or use [employer] to schedule a future lab appointment  fasting in 3 months regarding diabetes, prostate cancer screening, lipids, etc.   For fasting labs, please refrain from eating for 8 hours or more.   Drink 2 glasses of water before your lab appointment. It is fine to take your  oral medications on the morning of the lab test as usual  Restart losartan at lower dose of 50 mg tablet, 1 tablet daily for blood pressure.  Remain off of amlodipine

## 2022-02-17 NOTE — PROGRESS NOTES
ASSESSMENT:   1. Gastrointestinal hemorrhage, unspecified gastrointestinal hemorrhage type  Appears related to diverticular bleed.  Initial endoscopy showed some sign of previous bleeding duodenal area also but not thought enough to explain patient's level of blood loss.  Continue PPI therapy and low residue diet.  Patient to remain off of aspirin for now.  Labs as ordered to assess current iron and hemoglobin status after transfusions and whether additional oral iron necessary  - CBC with platelets; Future  - Ferritin; Future  - Iron & Iron Binding Capacity; Future  - CBC with platelets  - Ferritin  - Iron & Iron Binding Capacity    2. Duodenitis  Possibly related to aspirin and previous alcohol use.  Will check H. pylori studies in addition  - Helicobacter pylori Antigen Stool; Future    3. Anemia, unspecified type  Secondary to GI bleeding.  See plan with #1  - CBC with platelets; Future  - Ferritin; Future  - Iron & Iron Binding Capacity; Future  - CBC with platelets  - Ferritin  - Iron & Iron Binding Capacity    4. Type 2 diabetes mellitus with diabetic nephropathy, without long-term current use of insulin (H)  Intolerant of Metformin doses higher than 500 mg a day due to diarrhea.  With CAD, would benefit from medicines like Jardiance if affordable over glimepiride.  Will restart Metformin 500 mg daily and continue current glimepiride for now and patient will look into cost issues with Jardiance to see if affordable.  Repeat fasting labs in 3 months  - metFORMIN (GLUCOPHAGE) 1000 MG tablet; Take 0.5 tablets (500 mg) by mouth daily (with dinner)  - glimepiride (AMARYL) 2 MG tablet; TAKE 1 TABLET EVERY MORNING BEFORE BREAKFAST   Dispense: 90 tablet; Refill: 3  - Hemoglobin A1c; Future  - Lipid panel reflex to direct LDL Fasting; Future  - Albumin Random Urine Quantitative with Creat Ratio; Future  - Comprehensive metabolic panel; Future    5. Abdominal aortic aneurysm (AAA) without rupture (H)  3.1 cm.  We  will add back losartan for additional blood pressure control.  Would benefit in future from possible beta-blocker therapy but will first see what her heart rate runs when patient has normal hemoglobin status.  Recheck abdominal ultrasound in 1 year  - losartan (COZAAR) 50 MG tablet; Take 1 tablet (50 mg) by mouth daily  Dispense: 90 tablet; Refill: 3    6. Essential hypertension  Controlled but would benefit from lower pressure with AAA.  Will restart losartan at lower dose  - losartan (COZAAR) 50 MG tablet; Take 1 tablet (50 mg) by mouth daily  Dispense: 90 tablet; Refill: 3    7. Cough  CT of the abdomen on 2/3/2022 showed the lower lungs to be normal.  Patient did have possible aspiration with most recent GI bleed episode.  Chest x-ray 2/6/2022 was negative but abdominal CT a few days later showed questionable budding infiltrate at bases. Occ cough. NO F/C. Will repeat CXR  - XR Chest 2 Views; Future    8. Screening for prostate cancer  Due for prostate cancer screening again in 3 months  - Prostate Specific Antigen Screen; Future              PLAN:   Continue Pantoprazole 1 tab twice a day for 30 days. Then 1 tab daily in AM for acid suppression   Restart metformin 1000mg tab, 1/2 tab (500mg) at supper once a day  Restart Glimepiride 2mg tab, 1 tab daily in AM for diabetes   Talk with insurance/pharmacy cost of Jardiance 25mg tab, 1 tab daily for diabetes and let me know  Remain off of Aspirin for now  Remain off of ALL alcohol  CXR   Labs today as ordered   Continue low fiber diet for the next 2 weeks   H pylori stool test.  at lab and  drop off back at the clinic lab  Repeat Ultrasound of abdominal aorta in 1 year  Call [facility] at [phone] to schedule follow-up with them. Per prior discharge summary, be seen in their liver clinic  Call  [phone] or use [employer] to schedule a future lab appointment  fasting in 3 months regarding diabetes, prostate cancer screening, lipids, etc.   For fasting labs,  please refrain from eating for 8 hours or more.   Drink 2 glasses of water before your lab appointment. It is fine to take your  oral medications on the morning of the lab test as usual  Restart losartan at lower dose of 50 mg tablet, 1 tablet daily for blood pressure.  Remain off of amlodipine       > 45 min was spent both with in person clinic appointment and review of hospital notes from 3 admissions before and after appointment      (Chart documentation was completed, in part, with [employer] voice-recognition software. Even though reviewed, some grammatical, spelling, and word errors may remain.)    Markel Kauffman MD  Internal Medicine Department  [facility] APRIL Call is a 71 year old who presents for the following health issues     HPI       Hospital Follow-up Visit:    Hospital/Nursing Home/ Rehab Facility: [facility]  Date of Admission: 2/10/2022  Date of Discharge: 2/15/2022  Reason(s) for Admission: Suspected recurrent acute sigmoid divertrcular bleeding causing acute blood loss anemia      No Tele follow-up contact between discharge date and appt today    Was your hospitalization related to COVID-19? No   Problems taking medications regularly:  None  Medication changes since discharge: None  Problems adhering to non-medication therapy:  None    Summary of hospitalization:  [facility] discharge summary reviewed  Diagnostic Tests/Treatments reviewed.  Follow up needed:  Labs, future repeat endoscopy  Other Healthcare Providers Involved in Patient s Care:          GI  Update since discharge: improved.       Post Discharge Medication Reconciliation: discharge medications reconciled and changed, per note/orders.  Plan of care communicated with patient and wife               Discharge summary reviewed. Part of the summary as below:    [facility]  Hospitalist Discharge Summary       Date of Admission:   2/10/2022  Date of Discharge:  2/15/2022  Discharging Provider: Nickolas Mark MD, MD  Discharge Service: Hospitalist Service        Discharge Diagnoses        Suspected recurrent acute sigmoid divertrcular bleeding causing acute blood loss anemia     Coronary artery disease s/p PCI in 2016  Hypertension  Hyperlipidemia     Type 2 Diabetes mellitus           Follow-ups Needed After Discharge     Follow-up Appointments     Follow-up and recommended labs and tests       Follow up with primary care provider, Markel Kauffman, within 7 days to   evaluate medication change.  The following labs/tests are recommended:   CBC.         {      Unresulted Labs Ordered in the Past 30 Days of this Admission      No orders found from 1/11/2022 to 2/11/2022.                Discharge Disposition      Discharged to home  Condition at discharge: Stable           Hospital Course         Jakub Robles is a markedly pleasant 71 year old gentleman with past medical history that is most notable for CAD and Type 2 Diabetes mellitus, who was recently admitted here for GI bleeding causing acute blood loss anemia from 2/3/22 to 2/4/22, and re-admitted from 2/4/22 to 2/8/22 for recurrent bleeding, and now returns for recurrent hematochezia suspected due to acute diverticular bleeding.     Suspected recurrent acute sigmoid divertrcular bleeding causing acute blood loss anemia  -This is his third hospitalization in the last couple of weeks for recurrent GI bleed  -Given the nature of the bleed high suspicion for diverticular bleed  -Hemoglobin at the time of discharge was stable around 8.5-now, at presentation on 2/10 lower again at 7.2  -Appreciate [facility] reconsulted, flexible sigmoidoscopy completed on 2/10 which revealed diverticulosis and large volume of clotted blood in the sigmoid and descending colon.  No active bleeding or fresh blood.    -Appreciate colorectal surgery evaluation.  Continued serial hemoglobin checks which remained  "stable. Also had capsule study :No significant findings noted on the pillcam. No source of bleeding so he was discharged home     -Transfuse as necessary (PRBC- 2/11)     Coronary artery disease s/p PCI in 2016  Hypertension  Hyperlipidemia  -Continued to hold prior to admission aspirin  -Continued hold Lipitor, amlodipine and losartan given labile blood pressure and concerns for volume depletion from potential rebleed     Type 2 Diabetes mellitus  -PTA on Glimepiride and Metformin as an outpatient. Resumed on discharge  -Held oral anti-diabetic medications  -High SS insulin while hospitalized.            Most recent lab results reviewed with pt.      Patient states stools are now brown in color.  He has not had any alcohol since hospital discharge.  No biopsies were done with either recent endoscopy to check for H. pylori infection.  GI bleeding was thought related to diverticulosis.  Patient off of aspirin therapy.  Required multiple blood transfusions  Had some low blood pressures with GI bleeds.  Amlodipine and losartan stopped in hospital and patient has not restarted using  When patient was taking Metformin 500 mg daily, then did not have any diarrhea issues.  Once dose was increased to 1000 mg/day, then patient began having diarrhea even prior to issues with GI bleeding above.  Recent A1c in the 5's but likely biased by blood transfusions.  Had been in the low 7's a few weeks ago.  Patient not taking any Metformin currently.  Blood sugars in the  192-280 range off of DM meds       Additional ROS:   Constitutional, HEENT, Cardiovascular, Pulmonary, GI and , Neuro, MSK and Psych review of systems/symptoms are otherwise negative or unchanged from previous, except as noted above.      OBJECTIVE:  /62   Pulse 86   Temp 97.5  F (36.4  C) (Temporal)   Resp 16   Ht 1.74 m (5' 8.5\")   Wt 83.9 kg (185 lb)   SpO2 99%   BMI 27.72 kg/m     Estimated body mass index is 27.72 kg/m  as calculated from the " "following:    Height as of this encounter: 1.74 m (5' 8.5\").    Weight as of this encounter: 83.9 kg (185 lb).      Pulm: Lungs clear to auscultation   CV: Regular rates and rhythm  GI: Soft, nontender, Normal active bowel sounds, No hepatosplenomegaly or masses palpable. Not able to palpate AAA. No bruit  Ext: Peripheral pulses intact. No edema.                           "

## 2022-02-18 ENCOUNTER — TELEPHONE (OUTPATIENT)
Dept: INTERNAL MEDICINE | Facility: CLINIC | Age: 72
End: 2022-02-18
Payer: MEDICARE

## 2022-02-18 ENCOUNTER — TRANSFERRED RECORDS (OUTPATIENT)
Dept: HEALTH INFORMATION MANAGEMENT | Facility: CLINIC | Age: 72
End: 2022-02-18
Payer: MEDICARE

## 2022-02-18 NOTE — TELEPHONE ENCOUNTER
Dr. Kauffman,     Pt calling in to report he hasn't heard anything on his labs or if he should be starting an iron supplement    Phone number [phone]    Padma Sams, RN

## 2022-02-20 PROBLEM — E87.5 HYPERKALEMIA: Status: RESOLVED | Noted: 2022-02-04 | Resolved: 2022-02-20

## 2022-02-20 RX ORDER — LOSARTAN POTASSIUM 50 MG/1
50 TABLET ORAL DAILY
Qty: 90 TABLET | Refills: 3 | Status: SHIPPED | OUTPATIENT
Start: 2022-02-20 | End: 2022-11-01

## 2022-02-22 ENCOUNTER — MYC MEDICAL ADVICE (OUTPATIENT)
Dept: INTERNAL MEDICINE | Facility: CLINIC | Age: 72
End: 2022-02-22
Payer: MEDICARE

## 2022-02-22 DIAGNOSIS — D62 ANEMIA DUE TO BLOOD LOSS, ACUTE: Primary | ICD-10-CM

## 2022-02-22 RX ORDER — FERROUS SULFATE 325(65) MG
325 TABLET ORAL DAILY
Qty: 30 TABLET | Refills: 0 | COMMUNITY
Start: 2022-02-22 | End: 2022-03-10

## 2022-02-22 RX ORDER — MULTIVIT WITH MINERALS/LUTEIN
500 TABLET ORAL DAILY
Qty: 60 TABLET | Refills: 0 | COMMUNITY
Start: 2022-02-22 | End: 2022-03-10

## 2022-02-22 NOTE — TELEPHONE ENCOUNTER
Please see mychart and advise appropriate course of action.    Regarding 2/18/22 telephone encounter    Angelica Canas RN

## 2022-02-23 ENCOUNTER — APPOINTMENT (OUTPATIENT)
Dept: LAB | Facility: CLINIC | Age: 72
End: 2022-02-23
Payer: MEDICARE

## 2022-02-23 NOTE — TELEPHONE ENCOUNTER
Spoke with patient.  Ferrous sulfate 325 mg daily along with vitamin C 500 mg daily for 1 month.  Patient will have nonfasting iron, hemoglobin labs in 1 month.  Will have H. pylori stool study done this week.  Patient will be fasting labs in 3 months.  Patient to schedule lab appointments for all of these.  Will take pantoprazole twice daily for 1 month, then daily.  If H. pylori positive, will treat this and then have patient stop pantoprazole after he completes 1 month of the daily dosing in the future.  If H. pylori is negative however, will then leave patient on long-term pantoprazole daily once he transitions to that level as above.  Patient will stay off of aspirin for now.  We will make further decisions for his when start patient on if H. pylori positive now or not.  Patient remains off of alcohol.  Denies seeing any blood in stools

## 2022-02-24 LAB — H PYLORI AG STL QL IA: NEGATIVE

## 2022-03-08 ENCOUNTER — MYC MEDICAL ADVICE (OUTPATIENT)
Dept: INTERNAL MEDICINE | Facility: CLINIC | Age: 72
End: 2022-03-08
Payer: MEDICARE

## 2022-03-08 DIAGNOSIS — D62 ANEMIA DUE TO BLOOD LOSS, ACUTE: ICD-10-CM

## 2022-03-08 DIAGNOSIS — K29.81 DUODENITIS WITH BLEEDING: ICD-10-CM

## 2022-03-09 NOTE — TELEPHONE ENCOUNTER
Please see patients questions regarding medications below,     Medications pended for review     Peter Chapa RN

## 2022-03-10 RX ORDER — PANTOPRAZOLE SODIUM 40 MG/1
40 TABLET, DELAYED RELEASE ORAL DAILY
Qty: 90 TABLET | Refills: 3 | Status: SHIPPED | OUTPATIENT
Start: 2022-03-10 | End: 2022-07-25

## 2022-03-10 RX ORDER — MULTIVIT WITH MINERALS/LUTEIN
500 TABLET ORAL DAILY
Qty: 60 TABLET | Refills: 0 | Status: SHIPPED | OUTPATIENT
Start: 2022-03-10 | End: 2022-04-04

## 2022-03-10 RX ORDER — FERROUS SULFATE 325(65) MG
325 TABLET ORAL DAILY
Qty: 30 TABLET | Refills: 0 | Status: SHIPPED | OUTPATIENT
Start: 2022-03-10 | End: 2022-05-10

## 2022-03-10 NOTE — TELEPHONE ENCOUNTER
Please see mychart from patient and advise appropriate course of action.      Peter Chapa RN  [facility] Triage Nurse

## 2022-03-18 ENCOUNTER — LAB (OUTPATIENT)
Dept: LAB | Facility: CLINIC | Age: 72
End: 2022-03-18
Payer: MEDICARE

## 2022-03-18 DIAGNOSIS — D62 ANEMIA DUE TO BLOOD LOSS, ACUTE: ICD-10-CM

## 2022-03-18 LAB — HGB BLD-MCNC: 11.4 G/DL (ref 13.3–17.7)

## 2022-03-18 PROCEDURE — 83550 IRON BINDING TEST: CPT

## 2022-03-18 PROCEDURE — 36415 COLL VENOUS BLD VENIPUNCTURE: CPT

## 2022-03-18 PROCEDURE — 82728 ASSAY OF FERRITIN: CPT

## 2022-03-18 PROCEDURE — 85018 HEMOGLOBIN: CPT

## 2022-03-20 LAB
FERRITIN SERPL-MCNC: 36 NG/ML (ref 26–388)
IRON SATN MFR SERPL: 22 % (ref 15–46)
IRON SERPL-MCNC: 69 UG/DL (ref 35–180)
TIBC SERPL-MCNC: 320 UG/DL (ref 240–430)

## 2022-03-25 ENCOUNTER — MYC MEDICAL ADVICE (OUTPATIENT)
Dept: INTERNAL MEDICINE | Facility: CLINIC | Age: 72
End: 2022-03-25
Payer: MEDICARE

## 2022-03-25 NOTE — TELEPHONE ENCOUNTER
Please see mychart from patient and advise on appropriate course of action.     Pt referring to lab work completed 3/18/22    Deena Baazn RN    [facility] Triage Nurse

## 2022-04-01 DIAGNOSIS — N52.9 ERECTILE DYSFUNCTION, UNSPECIFIED ERECTILE DYSFUNCTION TYPE: Primary | ICD-10-CM

## 2022-04-01 DIAGNOSIS — D62 ANEMIA DUE TO BLOOD LOSS, ACUTE: ICD-10-CM

## 2022-04-01 RX ORDER — SILDENAFIL 100 MG/1
100 TABLET, FILM COATED ORAL DAILY PRN
Qty: 30 TABLET | Refills: 11
Start: 2022-04-01 | End: 2022-07-24

## 2022-04-02 NOTE — CONFIDENTIAL NOTE
Spoke with patient.  Reviewed most recent labs.  Hemoglobin and iron improved.  Ferritin low normal.  Hemoglobin still mildly low.  Patient was instructed to continue iron and vitamin C daily.  Patient is getting this over-the-counter.  Patient will have repeat labs in late May as previously ordered.  Patient will do that lab fasting.  We will recheck hemoglobin, iron, ferritin when he has other diabetic labs drawn at that time.  Patient states he has been planning some softball for exercise and running without chest pain or shortness of breath.  Okay to be using Viagra.  Patient states he is only used nitroglycerin once before that was when he was hospitalized with GI bleeding and took the medication thinking he may have had a heart issue.  Patient was instructed he cannot take nitroglycerin within the 48 hours of Viagra dose and if he has to take any future nitro tablets, then he is to discontinue Viagra use completely.

## 2022-04-02 NOTE — TELEPHONE ENCOUNTER
Spoke with patient by phone.  Reviewed most recent results.   Hemoglobin improved to 11.4.  Total iron levels normal but on the low end of normal.  Ferritin low normal.  Will stay on iron and vitamin C daily.  Pantoprazole once a day.  Patient playing softball and may continue exercise as tolerated.  May use Viagra as needed for sexual activity.  Patient has a prescription for nitro but not using.  Counseled he should not use nitro within 48 hours of Viagra use and if he takes nitro in the future, then he is to discontinue Viagra use.  Patient will have repeat labs in late May as currently scheduled.  Lab orders in chart

## 2022-04-04 ENCOUNTER — MYC REFILL (OUTPATIENT)
Dept: INTERNAL MEDICINE | Facility: CLINIC | Age: 72
End: 2022-04-04
Payer: MEDICARE

## 2022-04-04 DIAGNOSIS — D62 ANEMIA DUE TO BLOOD LOSS, ACUTE: ICD-10-CM

## 2022-04-04 NOTE — TELEPHONE ENCOUNTER
Routing refill request to provider for review/approval because:  Drug not on the [facility] refill protocol   Angelica Canas RN

## 2022-04-08 RX ORDER — MULTIVIT WITH MINERALS/LUTEIN
500 TABLET ORAL DAILY
Qty: 180 TABLET | Refills: 0 | Status: SHIPPED | OUTPATIENT
Start: 2022-04-08 | End: 2022-07-25

## 2022-04-29 ENCOUNTER — TRANSFERRED RECORDS (OUTPATIENT)
Dept: HEALTH INFORMATION MANAGEMENT | Facility: CLINIC | Age: 72
End: 2022-04-29
Payer: MEDICARE

## 2022-05-07 ENCOUNTER — OFFICE VISIT (OUTPATIENT)
Dept: URGENT CARE | Facility: URGENT CARE | Age: 72
End: 2022-05-07
Payer: MEDICARE

## 2022-05-07 VITALS — DIASTOLIC BLOOD PRESSURE: 69 MMHG | HEART RATE: 54 BPM | TEMPERATURE: 96.2 F | SYSTOLIC BLOOD PRESSURE: 120 MMHG

## 2022-05-07 DIAGNOSIS — S01.01XA SCALP LACERATION, INITIAL ENCOUNTER: Primary | ICD-10-CM

## 2022-05-07 PROCEDURE — 12001 RPR S/N/AX/GEN/TRNK 2.5CM/<: CPT | Performed by: FAMILY MEDICINE

## 2022-05-08 DIAGNOSIS — D62 ANEMIA DUE TO BLOOD LOSS, ACUTE: ICD-10-CM

## 2022-05-08 NOTE — PROGRESS NOTES
SUBJECTIVE: @[email or web address]@.ident who presents to the clinic with a laceration on the scalp sustained hour(s) ago.    This is a non-work related and accidental injury.    Mechanism of injury: sheet metal.  Associated symptoms: Denies numbness, weakness, or loss of function  Last tetanus booster within 10 years: yes    Past Medical History:   Diagnosis Date     AAA (abdominal aortic aneurysm) (H)      CAD (coronary artery disease)      Diabetes mellitus (H)      Diverticulosis of large intestine      Hepatitis C      Hyperlipidemia      Hypertension      PUD (peptic ulcer disease)      No Known Allergies  Social History     Tobacco Use     Smoking status: Former Smoker     Packs/day: 0.00     Years: 0.00     Pack years: 0.00     Types: Cigars     Start date: 1968     Quit date: 2001     Years since quittin.3     Smokeless tobacco: Never Used     Tobacco comment: I still vape   Substance Use Topics     Alcohol use: Yes     Comment: couple glasses wine/day       ROS:  Neuro: good distal sensation  Motor: normal rom and strenght  Hem: capillary refill < 2 sec    EXAM: The patient appears today in alert,no apparent distress distressVITALS:   /69   Pulse 54   Temp (!) 96.2  F (35.7  C) (Tympanic)   Size of laceration: 2 centimeters  Characteristics of the laceration: clean and straight  Tendon function intact: yes  Sensation to light touch intact: yes  Pulses/Capillary refill intact: yes      ICD-10-CM    1. Scalp laceration, initial encounter  S01.01XA REPAIR SUPERFICIAL, WOUND BODY < =2.5CM     Procedure Note:Wound injected with 1 cc's of Lidocaine 1% with epinephrine  Good anesthesia was obtainedPrepped and draped in the usual sterile fashion  Wound cleaned with sterile waterLaceration was closed using 3 staples  After care instructions:Keep wound clean   Staples out in 10 days  Signs of infection discussed today

## 2022-05-10 RX ORDER — FERROUS SULFATE 325(65) MG
TABLET ORAL
Qty: 30 TABLET | Refills: 1 | Status: SHIPPED | OUTPATIENT
Start: 2022-05-10 | End: 2022-07-19

## 2022-05-20 ENCOUNTER — OFFICE VISIT (OUTPATIENT)
Dept: URGENT CARE | Facility: URGENT CARE | Age: 72
End: 2022-05-20
Payer: MEDICARE

## 2022-05-20 DIAGNOSIS — Z48.02 VISIT FOR SUTURE REMOVAL: Primary | ICD-10-CM

## 2022-05-20 PROCEDURE — 99207 PR NO CHARGE NURSE ONLY: CPT

## 2022-05-20 NOTE — PROGRESS NOTES
Suture removal:     Date sutures applied: 5/7/22         Where (setting) in which they applied:Urgent Care visit    Description:  Type: sutures and staples  Location: scalp    History:    Cause of laceration: Garage door    Accompanying Signs & Symptoms: (staff: if yes-describe)  Redness: no  Warmth: no  Drainage: no  Still bleeding: no  Fevers: no    Last tetanus shot: last tetanus booster within 10 years    Lora West CMA on 5/20/2022 at 5:31 PM

## 2022-05-24 ENCOUNTER — IMMUNIZATION (OUTPATIENT)
Dept: NURSING | Facility: CLINIC | Age: 72
End: 2022-05-24
Payer: MEDICARE

## 2022-05-24 PROCEDURE — 91306 COVID-19,PF,MODERNA (18+ YRS BOOSTER .25ML): CPT

## 2022-05-24 PROCEDURE — 0064A COVID-19,PF,MODERNA (18+ YRS BOOSTER .25ML): CPT

## 2022-05-25 ENCOUNTER — LAB (OUTPATIENT)
Dept: LAB | Facility: CLINIC | Age: 72
End: 2022-05-25
Payer: MEDICARE

## 2022-05-25 DIAGNOSIS — E11.21 TYPE 2 DIABETES MELLITUS WITH DIABETIC NEPHROPATHY, WITHOUT LONG-TERM CURRENT USE OF INSULIN (H): ICD-10-CM

## 2022-05-25 DIAGNOSIS — D62 ANEMIA DUE TO BLOOD LOSS, ACUTE: ICD-10-CM

## 2022-05-25 DIAGNOSIS — Z12.5 SCREENING FOR PROSTATE CANCER: ICD-10-CM

## 2022-05-25 LAB
ALBUMIN SERPL-MCNC: 4.1 G/DL (ref 3.4–5)
ALP SERPL-CCNC: 68 U/L (ref 40–150)
ALT SERPL W P-5'-P-CCNC: 26 U/L (ref 0–70)
ANION GAP SERPL CALCULATED.3IONS-SCNC: 6 MMOL/L (ref 3–14)
AST SERPL W P-5'-P-CCNC: 9 U/L (ref 0–45)
BILIRUB SERPL-MCNC: 0.4 MG/DL (ref 0.2–1.3)
BUN SERPL-MCNC: 16 MG/DL (ref 7–30)
CALCIUM SERPL-MCNC: 8.9 MG/DL (ref 8.5–10.1)
CHLORIDE BLD-SCNC: 101 MMOL/L (ref 94–109)
CHOLEST SERPL-MCNC: 112 MG/DL
CO2 SERPL-SCNC: 26 MMOL/L (ref 20–32)
CREAT SERPL-MCNC: 0.7 MG/DL (ref 0.66–1.25)
CREAT UR-MCNC: 105 MG/DL
FASTING STATUS PATIENT QL REPORTED: YES
FERRITIN SERPL-MCNC: 38 NG/ML (ref 26–388)
GFR SERPL CREATININE-BSD FRML MDRD: >90 ML/MIN/1.73M2
GLUCOSE BLD-MCNC: 224 MG/DL (ref 70–99)
HBA1C MFR BLD: 9.2 % (ref 0–5.6)
HDLC SERPL-MCNC: 58 MG/DL
HGB BLD-MCNC: 13.5 G/DL (ref 13.3–17.7)
IRON SATN MFR SERPL: 20 % (ref 15–46)
IRON SERPL-MCNC: 59 UG/DL (ref 35–180)
LDLC SERPL CALC-MCNC: 44 MG/DL
MICROALBUMIN UR-MCNC: 10 MG/L
MICROALBUMIN/CREAT UR: 9.52 MG/G CR (ref 0–17)
NONHDLC SERPL-MCNC: 54 MG/DL
POTASSIUM BLD-SCNC: 4.1 MMOL/L (ref 3.4–5.3)
PROT SERPL-MCNC: 7.5 G/DL (ref 6.8–8.8)
PSA SERPL-MCNC: 1.46 UG/L (ref 0–4)
SODIUM SERPL-SCNC: 133 MMOL/L (ref 133–144)
TIBC SERPL-MCNC: 296 UG/DL (ref 240–430)
TRIGL SERPL-MCNC: 48 MG/DL

## 2022-05-25 PROCEDURE — 36415 COLL VENOUS BLD VENIPUNCTURE: CPT

## 2022-05-25 PROCEDURE — 85018 HEMOGLOBIN: CPT

## 2022-05-25 PROCEDURE — 80053 COMPREHEN METABOLIC PANEL: CPT

## 2022-05-25 PROCEDURE — 83550 IRON BINDING TEST: CPT

## 2022-05-25 PROCEDURE — 83036 HEMOGLOBIN GLYCOSYLATED A1C: CPT

## 2022-05-25 PROCEDURE — 80061 LIPID PANEL: CPT

## 2022-05-25 PROCEDURE — 82728 ASSAY OF FERRITIN: CPT

## 2022-05-25 PROCEDURE — 82043 UR ALBUMIN QUANTITATIVE: CPT

## 2022-05-25 PROCEDURE — G0103 PSA SCREENING: HCPCS

## 2022-06-14 ENCOUNTER — MYC MEDICAL ADVICE (OUTPATIENT)
Dept: INTERNAL MEDICINE | Facility: CLINIC | Age: 72
End: 2022-06-14

## 2022-06-15 NOTE — TELEPHONE ENCOUNTER
Please see mychart from patient and advise appropriate course of action.      Peter Chapa RN  [facility] Triage Nurse

## 2022-06-29 NOTE — TELEPHONE ENCOUNTER
Dr. Kauffman,    Pt calling again to request his lab results from 5/25/22.  Last requested 6/14/22 via this .   Cannot find any lab result note in chart.    Pt's main concern is hgb/iron, as he's still taking iron/vit C daily and wondering when he can get off of this due to constipation.  Did advise pt could use OTC stool softener for diarrhea.    Writer's main concern is A1C jump from 5.8 to 9.2   Verified pt is taking 500mg metformin daily, 2mg glimiperide daily.  Pt has quit checking blood sugars as is fustrated with elevating levels, and feels this is off due to medication and diet changes with GI issues 4 months ago.     Pt concerned and asking for next follow up steps.  Please advise.    Mecca Ortiz, RN  [facility] RN Triage Team

## 2022-07-17 DIAGNOSIS — D62 ANEMIA DUE TO BLOOD LOSS, ACUTE: ICD-10-CM

## 2022-07-19 RX ORDER — FERROUS SULFATE 325(65) MG
TABLET ORAL
Qty: 30 TABLET | Refills: 1 | Status: SHIPPED | OUTPATIENT
Start: 2022-07-19 | End: 2022-07-25

## 2022-07-19 NOTE — TELEPHONE ENCOUNTER
Dr. Kauffman-please review, sign if agree and may close encounter.    Routing refill request to provider for review/approval because:  Drug not on the G refill protocol     Last Written Prescription Date:  4/8/22  Last Fill Quantity: 180,  # refills: 0   Last office visit: 2/17/2022 with prescribing provider:  Dr. Kauffman   Future Office Visit:  7/25/22 with Dr. Kauffman    Prescription for Ferosul approved per [facility] Refill Protocol.    Thank you!  AVI BaldwinN, RN  [facility]

## 2022-07-24 DIAGNOSIS — N52.9 ERECTILE DYSFUNCTION, UNSPECIFIED ERECTILE DYSFUNCTION TYPE: ICD-10-CM

## 2022-07-24 RX ORDER — SILDENAFIL 100 MG/1
TABLET, FILM COATED ORAL
Qty: 30 TABLET | Refills: 6 | Status: SHIPPED | OUTPATIENT
Start: 2022-07-24

## 2022-07-25 ENCOUNTER — VIRTUAL VISIT (OUTPATIENT)
Dept: INTERNAL MEDICINE | Facility: CLINIC | Age: 72
End: 2022-07-25
Payer: MEDICARE

## 2022-07-25 DIAGNOSIS — E78.5 HYPERLIPIDEMIA LDL GOAL <70: ICD-10-CM

## 2022-07-25 DIAGNOSIS — E11.9 TYPE 2 DIABETES MELLITUS WITHOUT COMPLICATION, WITHOUT LONG-TERM CURRENT USE OF INSULIN (H): ICD-10-CM

## 2022-07-25 DIAGNOSIS — D50.9 IRON DEFICIENCY ANEMIA, UNSPECIFIED IRON DEFICIENCY ANEMIA TYPE: ICD-10-CM

## 2022-07-25 PROBLEM — D62 ANEMIA DUE TO BLOOD LOSS, ACUTE: Status: RESOLVED | Noted: 2022-02-10 | Resolved: 2022-07-25

## 2022-07-25 PROBLEM — K29.81 DUODENITIS WITH BLEEDING: Status: RESOLVED | Noted: 2022-02-04 | Resolved: 2022-07-25

## 2022-07-25 PROBLEM — K92.2 GASTROINTESTINAL HEMORRHAGE, UNSPECIFIED GASTROINTESTINAL HEMORRHAGE TYPE: Status: RESOLVED | Noted: 2022-02-04 | Resolved: 2022-07-25

## 2022-07-25 PROCEDURE — 99214 OFFICE O/P EST MOD 30 MIN: CPT | Mod: 95 | Performed by: INTERNAL MEDICINE

## 2022-07-25 RX ORDER — ASPIRIN 81 MG/1
TABLET ORAL
COMMUNITY

## 2022-07-25 RX ORDER — MULTIVIT WITH MINERALS/LUTEIN
TABLET ORAL
Qty: 180 TABLET | Refills: 0 | OUTPATIENT
Start: 2022-07-25

## 2022-07-25 RX ORDER — ATORVASTATIN CALCIUM 40 MG/1
TABLET, FILM COATED ORAL
Qty: 90 TABLET | Refills: 3 | Status: SHIPPED | OUTPATIENT
Start: 2022-07-25 | End: 2022-10-05

## 2022-07-25 NOTE — TELEPHONE ENCOUNTER
No recent use of nitro.  Previously discussed that patient cannot take Viagra if nitro was used within 48 hours prior.  Medication refilled

## 2022-07-25 NOTE — PROGRESS NOTES
Jakub is a 72 year old who is being evaluated via a billable video visit.      How would you like to obtain your AVS? [employer]  If the video visit is dropped, the invitation should be resent by:TIFFANY - Text to cell phone: [phone]   Will anyone else be joining your video visit? No          ASSESSMENT:   1. Iron deficiency anemia, unspecified iron deficiency anemia type  Hemoglobin and iron level normal.  Ferritin at goal low normal.  These labs are 2 months ago and patient is increased and further since that time denies seeing any blood in his stools.  After PPI therapy for GI recommendation that is not affecting absorption.  We will start ferrous sulfate and vitamin C at the moment and have patient recheck labs as below.  Iron stores improving hemoglobin remains normal, will stay off of iron supplements  - Ferritin; Future  - Iron & Iron Binding Capacity; Future  - Hemoglobin; Future    2. Type 2 diabetes mellitus without complication, without long-term current use of insulin (H)  Worsened when patient quit following diabetic diet, simply concentrating on eating foods with increased iron such as dark chocolate, cream of rice and other starchy your sugary foods.  He was seen previously 5.8 when diet was followed well.  Patient will restart diabetic diet as previous and increase walking exercise.  Recheck A1c 3 months.  Patient will send me update regarding blood sugars in a couple weeks.  - Hemoglobin A1c; Future    3. Hyperlipidemia LDL goal <70  Recent lipids at goal.  Continue statin therapy  - atorvastatin (LIPITOR) 40 MG tablet; TAKE 1 TABLET EVERY DAY  Dispense: 90 tablet; Refill: 3      PLAN:  Stop ferrous sulfate/iron and vitamin C supplements  Reduce carbohydrate (sugars, starchy foods such as bread, rice, potato, pasta, etc) intake  in your diet and increase the amount of color on your plate with fruits, vegetables and lean meats.  Call our appointment desk at [phone] or use [employer] to schedule  "a \"lab only\" to have your Hemoglobin and Iron/TIBC checked non-fasting in the next 1 week and your diabetic A1C  lab checked non-fasting in 3 months.    In 1 week (after restarting your previous diet  where blood sugars were well controlled and not concentrating only on iron), check blood sugars twice a day (before breakfast and bedtime) for 4 days   and send me the results in a [employer] message  Continue other current medications      Video-Visit Details    Type of service:  Video Visit    Video Start Time: 11:51am    Video End Time:12:06pm    Originating Location (pt. Location): Home    Distant Location (provider location):  [facility]     Platform used for Video Visit: Tamika Kauffman MD  Internal Medicine Department  [facility]  Internal Medicine Department      (Chart documentation was completed, in part, with [employer] voice-recognition software. Even though reviewed, some grammatical, spelling, and word errors may remain.)         Elissa Call is a 72 year old, presenting for the following health issues:  Follow up (Requesting labs/)      HPI     Requesting labs   Hyperlipidemia Follow-Up      Are you regularly taking any medication or supplement to lower your cholesterol?   Yes- Atorvastatin    Are you having muscle aches or other side effects that you think could be caused by your cholesterol lowering medication?  No      How many servings of fruits and vegetables do you eat daily?  At least 4    On average, how many sweetened beverages do you drink each day (Examples: soda, juice, sweet tea, etc.  Do NOT count diet or artificially sweetened beverages)?   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 60 or more    How many days per week do you miss taking your medication? 0       Most recent lab " results reviewed with pt.      Denies seeing blood in stools now.  Good energy.  Denies chest pain or shortness of breath.  Patient has been on iron supplementation with ferrous sulfate but he and his wife also decided that he should be getting multiple other sources with iron and so has been eating dark chocolate every night and also things like cream of rice every morning for breakfast that has increased carbs/sugar.  Patient blood sugars then john and he was seeing them occasionally over 200 where previously the A1c was below 6 with careful diet.  Patient therefore quit checking blood sugars more recently since he knew they were elevated.  Patient motivated to restart diabetic diet.  Has noticed blood sugars to review     Additional ROS:   Constitutional, HEENT, Cardiovascular, Pulmonary, GI and , Neuro, MSK and Psych review of systems/symptoms are otherwise negative or unchanged from previous, except as noted above.           Objective :  No vitals obtained today    Physical Exam:  GENERAL: Healthy, alert and no distress  EYES: Eyes grossly normal to inspection, conjunctivae and sclerae normal  RESP: no audible wheeze, cough, or visible cyanosis.  No visible retractions or increased work of breathing.  Able to speak fully in complete sentences.  NEURO: Cranial nerves grossly intact, mentation intact and speech normal  PSYCH: mentation appears normal, affect normal/bright, judgement and insight intact, normal speech and appearance well-groomed

## 2022-07-25 NOTE — PATIENT INSTRUCTIONS
"Stop ferrous sulfate/iron and vitamin C supplements  Reduce carbohydrate (sugars, starchy foods such as bread, rice, potato, pasta, etc) intake  in your diet and increase the amount of color on your plate with fruits, vegetables and lean meats.  Call our appointment desk at [phone] or use [employer] to schedule a \"lab only\" to have your Hemoglobin and Iron/TIBC checked non-fasting in the next 1 week and your diabetic A1C  lab checked non-fasting in 3 months.    In 1 week (after restarting your previous diet  where blood sugars were well controlled and not concentrating only on iron), check blood sugars twice a day (before breakfast and bedtime) for 4 days   and send me the results in a [employer] message  Continue other current medications    "

## 2022-07-29 ENCOUNTER — LAB (OUTPATIENT)
Dept: LAB | Facility: CLINIC | Age: 72
End: 2022-07-29
Payer: MEDICARE

## 2022-07-29 DIAGNOSIS — D50.9 IRON DEFICIENCY ANEMIA, UNSPECIFIED IRON DEFICIENCY ANEMIA TYPE: ICD-10-CM

## 2022-07-29 DIAGNOSIS — E11.9 TYPE 2 DIABETES MELLITUS WITHOUT COMPLICATION, WITHOUT LONG-TERM CURRENT USE OF INSULIN (H): ICD-10-CM

## 2022-07-29 LAB
FERRITIN SERPL-MCNC: 89 NG/ML (ref 26–388)
HBA1C MFR BLD: 9.8 % (ref 0–5.6)
HGB BLD-MCNC: 13.3 G/DL (ref 13.3–17.7)
IRON SATN MFR SERPL: 39 % (ref 15–46)
IRON SERPL-MCNC: 129 UG/DL (ref 35–180)
TIBC SERPL-MCNC: 329 UG/DL (ref 240–430)

## 2022-07-29 PROCEDURE — 85018 HEMOGLOBIN: CPT

## 2022-07-29 PROCEDURE — 36415 COLL VENOUS BLD VENIPUNCTURE: CPT

## 2022-07-29 PROCEDURE — 83550 IRON BINDING TEST: CPT

## 2022-07-29 PROCEDURE — 83036 HEMOGLOBIN GLYCOSYLATED A1C: CPT

## 2022-07-29 PROCEDURE — 82728 ASSAY OF FERRITIN: CPT

## 2022-08-15 ENCOUNTER — MYC MEDICAL ADVICE (OUTPATIENT)
Dept: INTERNAL MEDICINE | Facility: CLINIC | Age: 72
End: 2022-08-15

## 2022-08-17 NOTE — TELEPHONE ENCOUNTER
Please see mychart from patient and advise appropriate course of action.    Lore Aguilar RN  [facility] Triage Nurse

## 2022-09-01 ENCOUNTER — MYC MEDICAL ADVICE (OUTPATIENT)
Dept: INTERNAL MEDICINE | Facility: CLINIC | Age: 72
End: 2022-09-01

## 2022-09-01 DIAGNOSIS — E11.40 TYPE 2 DIABETES MELLITUS WITH DIABETIC NEUROPATHY, WITHOUT LONG-TERM CURRENT USE OF INSULIN (H): Primary | ICD-10-CM

## 2022-09-02 ENCOUNTER — TELEPHONE (OUTPATIENT)
Dept: INTERNAL MEDICINE | Facility: CLINIC | Age: 72
End: 2022-09-02

## 2022-09-02 DIAGNOSIS — E11.21 TYPE 2 DIABETES MELLITUS WITH DIABETIC NEPHROPATHY, WITHOUT LONG-TERM CURRENT USE OF INSULIN (H): Primary | ICD-10-CM

## 2022-09-02 RX ORDER — BLOOD SUGAR DIAGNOSTIC
STRIP MISCELLANEOUS
Qty: 100 STRIP | Refills: 3 | Status: SHIPPED | OUTPATIENT
Start: 2022-09-02 | End: 2022-09-07

## 2022-09-02 NOTE — TELEPHONE ENCOUNTER
Routing refill request to provider for review/approval because:  Last filled by previous PCP  Angelica Canas RN

## 2022-09-02 NOTE — TELEPHONE ENCOUNTER
"Per pharmacy    \"plan does not cover this med (blood glucose (ACCU-CHEK RYAN PLUS) test strip) please call plan at [phone] to initiate a prior authorization or call/fax the pharmacy to change med.    Pt id # is [id number]\"    [facility]   [phone]   [phone] fx  "

## 2022-09-06 NOTE — TELEPHONE ENCOUNTER
Dr. Kauffman,    Per pharmacy, medication not covered by insurance. Prior authorization or alternative medication?    Lore Aguilar RN

## 2022-09-07 NOTE — TELEPHONE ENCOUNTER
Since insurance will not cover Accuchek Anay brand, prescription sent to [facility] pharmacy for new glucometer and test strips for what ever brand patient's insurance formulary will cover. Inform pt

## 2022-09-28 ENCOUNTER — TRANSFERRED RECORDS (OUTPATIENT)
Dept: HEALTH INFORMATION MANAGEMENT | Facility: CLINIC | Age: 72
End: 2022-09-28

## 2022-09-28 LAB — RETINOPATHY: NEGATIVE

## 2022-10-03 DIAGNOSIS — E78.5 HYPERLIPIDEMIA LDL GOAL <70: ICD-10-CM

## 2022-10-05 DIAGNOSIS — E11.21 TYPE 2 DIABETES MELLITUS WITH DIABETIC NEPHROPATHY, WITHOUT LONG-TERM CURRENT USE OF INSULIN (H): ICD-10-CM

## 2022-10-05 RX ORDER — ATORVASTATIN CALCIUM 40 MG/1
TABLET, FILM COATED ORAL
Qty: 90 TABLET | Refills: 2 | Status: SHIPPED | OUTPATIENT
Start: 2022-10-05 | End: 2023-01-20

## 2022-10-07 RX ORDER — GLIMEPIRIDE 2 MG/1
TABLET ORAL
Qty: 90 TABLET | Refills: 0 | Status: SHIPPED | OUTPATIENT
Start: 2022-10-07 | End: 2023-01-17

## 2022-10-07 NOTE — TELEPHONE ENCOUNTER
Prescription approved per [facility] Refill Protocol.  Peter Chapa RN  [facility] Triage Nurse

## 2022-10-13 ENCOUNTER — MEDICAL CORRESPONDENCE (OUTPATIENT)
Dept: HEALTH INFORMATION MANAGEMENT | Facility: CLINIC | Age: 72
End: 2022-10-13

## 2022-10-22 ENCOUNTER — HEALTH MAINTENANCE LETTER (OUTPATIENT)
Age: 72
End: 2022-10-22

## 2022-10-26 ENCOUNTER — LAB (OUTPATIENT)
Dept: LAB | Facility: CLINIC | Age: 72
End: 2022-10-26
Payer: MEDICARE

## 2022-10-26 DIAGNOSIS — E11.9 TYPE 2 DIABETES MELLITUS WITHOUT COMPLICATION, WITHOUT LONG-TERM CURRENT USE OF INSULIN (H): ICD-10-CM

## 2022-10-26 LAB — HBA1C MFR BLD: 8 % (ref 0–5.6)

## 2022-10-26 PROCEDURE — 83036 HEMOGLOBIN GLYCOSYLATED A1C: CPT

## 2022-10-26 PROCEDURE — 36415 COLL VENOUS BLD VENIPUNCTURE: CPT

## 2022-10-28 ENCOUNTER — TRANSFERRED RECORDS (OUTPATIENT)
Dept: HEALTH INFORMATION MANAGEMENT | Facility: CLINIC | Age: 72
End: 2022-10-28

## 2022-11-15 ENCOUNTER — NURSE TRIAGE (OUTPATIENT)
Dept: INTERNAL MEDICINE | Facility: CLINIC | Age: 72
End: 2022-11-15

## 2022-11-15 NOTE — TELEPHONE ENCOUNTER
Reason for Call:  Appointment Request    Patient requesting this type of appt:  Poss hernia     Requested provider: Markel Kauffman    Reason patient unable to be scheduled: Not within requested timeframe    When does patient want to be seen/preferred time: Same day    Comments: Poss hernia     [facility] or would you prefer to receive a phone call?:   Patient would prefer a phone call   Okay to leave a detailed message?: Yes at Cell number on file:    Telephone Information:   Mobile [phone]       Call taken on 11/15/2022 at 11:08 AM by Shirley Kwok

## 2022-11-18 ENCOUNTER — OFFICE VISIT (OUTPATIENT)
Dept: FAMILY MEDICINE | Facility: CLINIC | Age: 72
End: 2022-11-18
Payer: MEDICARE

## 2022-11-18 ENCOUNTER — APPOINTMENT (OUTPATIENT)
Dept: CT IMAGING | Facility: CLINIC | Age: 72
End: 2022-11-18
Attending: EMERGENCY MEDICINE
Payer: MEDICARE

## 2022-11-18 ENCOUNTER — HOSPITAL ENCOUNTER (EMERGENCY)
Facility: CLINIC | Age: 72
Discharge: HOME | End: 2022-11-18
Attending: EMERGENCY MEDICINE | Admitting: EMERGENCY MEDICINE
Payer: MEDICARE

## 2022-11-18 VITALS
HEART RATE: 78 BPM | RESPIRATION RATE: 16 BRPM | TEMPERATURE: 97.4 F | WEIGHT: 182 LBS | OXYGEN SATURATION: 99 % | DIASTOLIC BLOOD PRESSURE: 68 MMHG | SYSTOLIC BLOOD PRESSURE: 121 MMHG | BODY MASS INDEX: 27.27 KG/M2

## 2022-11-18 VITALS
HEART RATE: 81 BPM | OXYGEN SATURATION: 100 % | SYSTOLIC BLOOD PRESSURE: 102 MMHG | TEMPERATURE: 96 F | DIASTOLIC BLOOD PRESSURE: 64 MMHG

## 2022-11-18 DIAGNOSIS — R10.32 LLQ ABDOMINAL PAIN: ICD-10-CM

## 2022-11-18 DIAGNOSIS — R10.32 ABDOMINAL PAIN, LEFT LOWER QUADRANT: Primary | ICD-10-CM

## 2022-11-18 LAB
ANION GAP SERPL CALCULATED.3IONS-SCNC: 8 MMOL/L (ref 3–14)
BASOPHILS # BLD AUTO: 0 10E3/UL (ref 0–0.2)
BASOPHILS NFR BLD AUTO: 0 %
BUN SERPL-MCNC: 19 MG/DL (ref 7–30)
CALCIUM SERPL-MCNC: 9.2 MG/DL (ref 8.5–10.1)
CHLORIDE BLD-SCNC: 102 MMOL/L (ref 94–109)
CO2 SERPL-SCNC: 25 MMOL/L (ref 20–32)
CREAT SERPL-MCNC: 0.74 MG/DL (ref 0.66–1.25)
EOSINOPHIL # BLD AUTO: 0.3 10E3/UL (ref 0–0.7)
EOSINOPHIL NFR BLD AUTO: 4 %
ERYTHROCYTE [DISTWIDTH] IN BLOOD BY AUTOMATED COUNT: 12.9 % (ref 10–15)
GFR SERPL CREATININE-BSD FRML MDRD: >90 ML/MIN/1.73M2
GLUCOSE BLD-MCNC: 371 MG/DL (ref 70–99)
HCT VFR BLD AUTO: 41.1 % (ref 40–53)
HGB BLD-MCNC: 13.9 G/DL (ref 13.3–17.7)
HOLD SPECIMEN: NORMAL
HOLD SPECIMEN: NORMAL
IMM GRANULOCYTES # BLD: 0 10E3/UL
IMM GRANULOCYTES NFR BLD: 0 %
LYMPHOCYTES # BLD AUTO: 1.4 10E3/UL (ref 0.8–5.3)
LYMPHOCYTES NFR BLD AUTO: 24 %
MCH RBC QN AUTO: 29.3 PG (ref 26.5–33)
MCHC RBC AUTO-ENTMCNC: 33.8 G/DL (ref 31.5–36.5)
MCV RBC AUTO: 87 FL (ref 78–100)
MONOCYTES # BLD AUTO: 0.7 10E3/UL (ref 0–1.3)
MONOCYTES NFR BLD AUTO: 11 %
NEUTROPHILS # BLD AUTO: 3.4 10E3/UL (ref 1.6–8.3)
NEUTROPHILS NFR BLD AUTO: 61 %
NRBC # BLD AUTO: 0 10E3/UL
NRBC BLD AUTO-RTO: 0 /100
PLATELET # BLD AUTO: 196 10E3/UL (ref 150–450)
POTASSIUM BLD-SCNC: 4.1 MMOL/L (ref 3.4–5.3)
RBC # BLD AUTO: 4.74 10E6/UL (ref 4.4–5.9)
SODIUM SERPL-SCNC: 135 MMOL/L (ref 133–144)
WBC # BLD AUTO: 5.8 10E3/UL (ref 4–11)

## 2022-11-18 PROCEDURE — 36415 COLL VENOUS BLD VENIPUNCTURE: CPT | Performed by: EMERGENCY MEDICINE

## 2022-11-18 PROCEDURE — 250N000011 HC RX IP 250 OP 636: Performed by: EMERGENCY MEDICINE

## 2022-11-18 PROCEDURE — 85025 COMPLETE CBC W/AUTO DIFF WBC: CPT | Performed by: EMERGENCY MEDICINE

## 2022-11-18 PROCEDURE — 99214 OFFICE O/P EST MOD 30 MIN: CPT

## 2022-11-18 PROCEDURE — 99285 EMERGENCY DEPT VISIT HI MDM: CPT | Mod: 25

## 2022-11-18 PROCEDURE — 74177 CT ABD & PELVIS W/CONTRAST: CPT | Mod: MA

## 2022-11-18 PROCEDURE — 82310 ASSAY OF CALCIUM: CPT | Performed by: EMERGENCY MEDICINE

## 2022-11-18 PROCEDURE — 250N000009 HC RX 250: Performed by: EMERGENCY MEDICINE

## 2022-11-18 RX ORDER — IOPAMIDOL 755 MG/ML
92 INJECTION, SOLUTION INTRAVASCULAR ONCE
Status: COMPLETED | OUTPATIENT
Start: 2022-11-18 | End: 2022-11-18

## 2022-11-18 RX ADMIN — SODIUM CHLORIDE 66 ML: 9 INJECTION, SOLUTION INTRAVENOUS at 20:11

## 2022-11-18 RX ADMIN — IOPAMIDOL 92 ML: 755 INJECTION, SOLUTION INTRAVENOUS at 20:10

## 2022-11-18 ASSESSMENT — ENCOUNTER SYMPTOMS
DYSURIA: 0
DIARRHEA: 0
VOMITING: 0
HEMATURIA: 0
APPETITE CHANGE: 0
ABDOMINAL PAIN: 1
NAUSEA: 0
FREQUENCY: 0

## 2022-11-18 NOTE — PROGRESS NOTES
"  Assessment & Plan     Abdominal pain, left lower quadrant  Given his past medical history and the duration of his discomfort, will send him to the emergency room for further work-up.  Differential diagnoses include diverticulitis, hernia, aneurysm, muscle pain, referred pain from back pain among others.        20 minutes spent on the date of the encounter doing chart review, patient visit and documentation        Nicotine/Tobacco Cessation:  He reports that he has been smoking cigars and cigarettes. He started smoking about 54 years ago. He uses smokeless tobacco.  Nicotine/Tobacco Cessation Plan:         BMI:   Estimated body mass index is 27.72 kg/m  as calculated from the following:    Height as of 22: 1.74 m (5' 8.5\").    Weight as of 22: 83.9 kg (185 lb).       [facility]    Elissa Call is a 72 year old accompanied by his spouse, presenting for the following health issues:  Urgent Care and Abdominal Pain (2-3 WKS LOWER ABDOMINAL PAIN. HAVING BM, PRESSING ON HIS LOWER ABDOMINAL AREA AND MOVEMENT CAUSES PAIN. PAIN 5/10. NO HX OF ABDOMINAL SURGERY. DULL PAIN AND CERTAIN SPOTS CAUSES SHARP PAIN)      HPI     Presents with 2 to 3-week history of lower abdominal pain more so on the left side.  No fever, nausea, vomiting or diarrhea.  Bowel movements have been regular in timing color and consistency.  No hematochezia no dark tarry stools.  Has not noticed any bulges in the groin.  Notes that when his back pain is worse his abdominal pain is worse and vice versa  History of a GI bleed this year was hospitalized and could not find the source of the bleed.  He is also working with [employer] on back pain and disc issues.  Had an MRI done last week and is going to see a spinal doctor in 4 days.  Father  of it AAA, and patient has documented AAA of 3.1cm on CT 2/3/22    Review of Systems   Constitutional, HEENT, " cardiovascular, pulmonary, gi and gu systems are negative, except as otherwise noted.      Objective    /64   Pulse 81   Temp (!) 96  F (35.6  C) (Tympanic)   SpO2 100%   There is no height or weight on file to calculate BMI.  Physical Exam   GENERAL: healthy, alert and no distress  RESP: lungs clear to auscultation - no rales, rhonchi or wheezes  CV: regular rates and rhythm, normal S1 S2, no S3 or S4 and no murmur, click or rub  ABDOMEN: tenderness LLQ, no organomegaly or masses, bowel sounds normal and no palpable or pulsatile masses  : no scrotal swelling, no hernias felt.

## 2022-11-18 NOTE — TELEPHONE ENCOUNTER
"  Started having sudden back problems 2 weeks ago.  Was not lifting, unsure of any triggers.     L leg numb, tingling feeling.   Went to [facility], Xray showed deteriorating discs. This is not new to patient for past 35 yrs. Has had several ruptured/herniated discs in past but has always managed with PT,exercise.   MRI on Tuesday and appt with spine provider to review MRI results.     Currently having pain/discomfort in abdomen that was not there while at [facility]  Unable to establish an exact pattern.   Does not feel protrusions  Patient is unsure if he has hernia as he has no Hx.   Pain located on L side of groin area, down to pelvis bone, sore when applying pressure. Also above L side of groin to belly botton.   General discomfort when attempting to suck stomach muscles in.    Lays down on heating pad 15 mins that helps.  Walking around causes leg numbness to flare.  Wondering if abdominal pain is contributing or cause of back problem.   Lifting abdomen helps relieve back pain.  Thinks this may be associated with back pain.  Has several firm BMs daily, strains. Abd pain stays the post BM    Dispo: See in office today  No available appointments today in clinic or surrounding  clinics. RN advised patient be scheduled at [facility] to be seen today. Patient agrees and is scheduled today at [facility] walk-in clinic.    1. LOCATION: \"Where does it hurt?\"       See above  2. RADIATION: \"Does the pain shoot anywhere else?\" (e.g., chest, back)      See above  3. ONSET: \"When did the pain begin?\" (Minutes, hours or days ago)       1 week ago  4. SUDDEN: \"Gradual or sudden onset?\"      Sudden  5. PATTERN \"Does the pain come and go, or is it constant?\"     - If constant: \"Is it getting better, staying the same, or worsening?\"       (Note: Constant means the pain never goes away completely; most serious pain is constant and it progresses)      - If intermittent: \"How long does it last?\" \"Do you have pain now?\"      (Note: Intermittent means the " "pain goes away completely between bouts)      Intermittent.       Subsides at NOC with rest. Returns in Am and progresses throughout the day.  6. SEVERITY: \"How bad is the pain?\"  (e.g., Scale 1-10; mild, moderate, or severe)     - MODERATE (4-7): interferes with normal activities or awakens from sleep, abdomen tender to touch       5/10    7. RECURRENT SYMPTOM: \"Have you ever had this type of stomach pain before?\" If Yes, ask: \"When was the last time?\" and \"What happened that time?\"       No  8. CAUSE: \"What do you think is causing the stomach pain?\"      unknown  9. RELIEVING/AGGRAVATING FACTORS: \"What makes it better or worse?\" (e.g., movement, antacids, bowel movement)      See above  10. OTHER SYMPTOMS: \"Do you have any other symptoms?\" (e.g., back pain, diarrhea, fever, urination pain, vomiting)      Back pain - seeing [facility] for this        Reason for Disposition    Age > 60 years    Additional Information    Negative: Passed out (i.e., fainted, collapsed and was not responding)    Negative: Shock suspected (e.g., cold/pale/clammy skin, too weak to stand, low BP, rapid pulse)    Negative: Sounds like a life-threatening emergency to the triager    Negative: Chest pain    Negative: Pain is mainly in upper abdomen (if needed ask: 'is it mainly above the belly button?')    Negative: SEVERE abdominal pain (e.g., excruciating)    Negative: Vomiting red blood or black (coffee ground) material    Negative: Bloody, black, or tarry bowel movements  (Exception: Chronic-unchanged black-grey bowel movements and is taking iron pills or Pepto-Bismol.)    Negative: Unable to urinate (or only a few drops) and bladder feels very full    Negative: Pain in scrotum persists > 1 hour    Negative: Constant abdominal pain lasting > 2 hours    Negative: Vomiting bile (green color)    Negative: Patient sounds very sick or weak to the triager    Negative: Vomiting and abdomen looks much more swollen than usual    Negative: White of the " eyes have turned yellow (i.e., jaundice)    Negative: Blood in urine (red, pink, or tea-colored)    Negative: Fever > 103 F (39.4 C)    Negative: Fever > 101 F (38.3 C) and over 60 years of age    Negative: Fever > 100.0 F (37.8 C) and has diabetes mellitus or a weak immune system (e.g., HIV positive, cancer chemotherapy, organ transplant, splenectomy, chronic steroids)    Negative: Fever > 100.0 F (37.8 C) and bedridden (e.g., nursing home patient, stroke, chronic illness, recovering from surgery)    Negative: MODERATE pain (e.g., interferes with normal activities) that comes and goes (cramps) lasts > 24 hours  (Exception: pain with Vomiting or Diarrhea - see that Protocol)    Protocols used: ABDOMINAL PAIN - MALE-A-OH

## 2022-11-18 NOTE — ED TRIAGE NOTES
Low abd pain - started before the back issue. Also has some chronic back problems. Concerned he has a hernia. No N/V. Hx of GI bleed last February.

## 2022-11-18 NOTE — ED NOTES
Rapid Assessment Note    History:   The patient reports that she was sent here from his clinic for concerns for hernia. He reports that recently he has experienced lower abdominal pain that is exacerbated by walking. He describes a sensation of muscle soreness in his lower abdomen in a muscle that is the size of his finger, He explains that he has experienced some associated left leg pain with numbness and paresthesias. He also complains of some chronic back pains. He denies groin numbness, incontinence, nausea, vomiting, diarrhea, reduced appetite. He also denies any history of cancer or any recent falls. He notes that he had a severe GI bleed requiring hospitalization in February of this year.    Exam:   General:  Sitting on bed, comfortable appearing.   Head:  No obvious trauma to head  Ears, Nose:  External ears normal.  Nose normal.   Eyes:  Conjunctivae clear.  Pupils round and symmetry.    Neck:  Normal range of motion. Neck supple and symmetric.    Pulm/Chest:  No respiratory distress.  Breathing comfortably.    CV: Regular rate and rhythm.    ABD: Tenderness to the left lower quadrant  MSK:  Normal range of motion.   Neuro:  Alert. Moving all extremities.  No saddle anesthesia.  5 out of 5 dorsi and plantar flexion.  Tenderness over the left SI joint.  Skin:  Skin is warm and dry.    Psych:  Normal mood and affect. Behavior is normal.     Plan of Care:   I evaluated the patient and developed an initial plan of care. I discussed this plan and explained that I, or one of my partners, would be returning to complete the evaluation.  Broad differential was pursued include not limited to obstruction, perforation, AAA, dissection, radiculopathy, etc.  Patient had recent MRI for radiculopathy.  Pain with left lower quadrant.  Labs and CT scan ordered.    I, Savage Rasmussen, am serving as a scribe to document services personally performed by Margo Maciel MD, based on my observations and the provider's  statements to me.    11/5/2018  [employer]    Portions of this medical record were completed by a scribe. UPON MY REVIEW AND AUTHENTICATION BY ELECTRONIC SIGNATURE, this confirms (a) I performed the applicable clinical services, and (b) the record is accurate.      Margo Maciel MD  11/18/22 1944

## 2022-11-19 NOTE — DISCHARGE INSTRUCTIONS
Return to the ED if you are unable to tolerate fluids, intractable nausea or vomiting, severe abdominal pain, fevers >101 or other acute changes.  Please follow up with your PCP in 2-3 days.      Please follow-up with orthopedics regarding your MRI as scheduled.  Return to the ER with any weakness, numbness or tingling, loss of bowel or bladder function or other high risk features.    Discharge Instructions  Abdominal Pain    Abdominal pain (belly pain) can be caused by many things. Your evaluation today does not show the exact cause for your pain. Your provider today has decided that it is unlikely your pain is due to a life threatening problem, or a problem requiring surgery or hospital admission. Sometimes those problems cannot be found right away, so it is very important that you follow up as directed.  Sometimes only the changes which occur over time allow the cause of your pain to be found.    Generally, every Emergency Department visit should have a follow-up clinic visit with either a primary or a specialty clinic/provider. Please follow-up as instructed by your emergency provider today. With abdominal pain, we often recommend very close follow-up, such as the following day.    ADULTS:  Return to the Emergency Department right away if:    You get an oral temperature above 102oF or as directed by your provider.  You have blood in your stools. This may be bright red or appear as black, tarry stools.    You keep vomiting (throwing up) or cannot drink liquids.  You see blood when you vomit.   You cannot have a bowel movement or you cannot pass gas.  Your stomach gets bloated or bigger.  Your skin or the whites of your eyes look yellow.  You faint.  You have bloody, frequent or painful urination (peeing).  You have new symptoms or anything that worries you.    CHILDREN:  Return to the Emergency Department right away if your child has any of the above-listed symptoms or the following:    Pushes your hand away or  screams/cries when his/her belly is touched.  You notice your child is very fussy or weak.  Your child is very tired and is too tired to eat or drink.  Your child is dehydrated.  Signs of dehydration can be:  Significant change in the amount of wet diapers/urine.  Your infant or child starts to have dry mouth and lips, or no saliva (spit) or tears.    PREGNANT WOMEN:  Return to the Emergency Department right away if you have any of the above-listed symptoms or the following:    You have bleeding, leaking fluid or passing tissue from the vagina.  You have worse pain or cramping, or pain in your shoulder or back.  You have vomiting that will not stop.  You have a temperature of 100oF or more.  Your baby is not moving as much as usual.  You faint.  You get a bad headache with or without eye problems and abdominal pain.  You have a seizure.  You have unusual discharge from your vagina and abdominal pain.    Abdominal pain is pretty common during pregnancy.  Your pain may or may not be related to your pregnancy. You should follow-up closely with your OB provider so they can evaluate you and your baby.  Until you follow-up with your regular provider, do the following:     Avoid sex and do not put anything in your vagina.  Drink clear fluids.  Only take medications approved by your provider.    MORE INFORMATION:    Appendicitis:  A possible cause of abdominal pain in any person who still has their appendix is acute appendicitis. Appendicitis is often hard to diagnose.  Testing does not always rule out early appendicitis or other causes of abdominal pain. Close follow-up with your provider and re-evaluations may be needed to figure out the reason for your abdominal pain.    Follow-up:  It is very important that you make an appointment with your clinic and go to the appointment.  If you do not follow-up with your primary provider, it may result in missing an important development which could result in permanent injury or  "disability and/or lasting pain.  If there is any problem keeping your appointment, call your provider or return to the Emergency Department.    Medications:  Take your medications as directed by your provider today.  Before using over-the-counter medications, ask your provider and make sure to take the medications as directed.  If you have any questions about medications, ask your provider.    Diet:  Resume your normal diet as much as possible, but do not eat fried, fatty or spicy foods while you have pain.  Do not drink alcohol or have caffeine.  Do not smoke tobacco.    Probiotics: If you have been given an antibiotic, you may want to also take a probiotic pill or eat yogurt with live cultures. Probiotics have \"good bacteria\" to help your intestines stay healthy. Studies have shown that probiotics help prevent diarrhea (loose stools) and other intestine problems (including C. diff infection) when you take antibiotics. You can buy these without a prescription in the pharmacy section of the store.     If you were given a prescription for medicine here today, be sure to read all of the information (including the package insert) that comes with your prescription.  This will include important information about the medicine, its side effects, and any warnings that you need to know about.  The pharmacist who fills the prescription can provide more information and answer questions you may have about the medicine.  If you have questions or concerns that the pharmacist cannot address, please call or return to the Emergency Department.       Remember that you can always come back to the Emergency Department if you are not able to see your regular provider in the amount of time listed above, if you get any new symptoms, or if there is anything that worries you.    "

## 2022-11-19 NOTE — ED PROVIDER NOTES
History   Chief Complaint:  Abdominal Pain       The history is provided by the patient.      Jakub Robles is a 72 year old male with a history of CAD and hypertension who presents from his clinic for concerns for hernia. He reports that recently he has experienced lower abdominal pain that is exacerbated by walking. He describes a sensation of muscle soreness in his lower abdomen in a muscle that is the size of his finger, He explains that he has experienced some associated left leg pain with numbness and paresthesias. He also complains of some chronic back pains. He denies groin numbness, incontinence, nausea, vomiting, diarrhea, reduced appetite, dysuria, hematuria or frequency. No history of cancer or any recent falls. He notes that he had a severe GI bleed requiring hospitalization in February of this year.     Review of Systems   Constitutional: Negative for appetite change.   Gastrointestinal: Positive for abdominal pain. Negative for diarrhea, nausea and vomiting.   Genitourinary: Negative for dysuria, frequency and hematuria.   All other systems reviewed and are negative.    Allergies:  No Known Allergies    Medications:  aspirin 81 MG EC tablet  atorvastatin (LIPITOR) 40 MG tablet  glimepiride (AMARYL) 2 MG tablet  losartan (COZAAR) 50 MG tablet  metFORMIN (GLUCOPHAGE) 500 MG tablet  nitroGLYcerin (NITROSTAT) 0.4 MG sublingual tablet  sildenafil (VIAGRA) 100 MG tablet    Past Medical History:     DM 2 with nephropathy   Colon polyps   CAD  Erectile dysfunction   Hypertension  Hyperlipidemia      Past Surgical History:    Cardiac stent (x2)  Colonoscopy (x2)  Esophagogastroduodenoscopy (x3)  Head and neck surgery  Sigmoidoscopy   Sinus surgery     Family History:    Arthritis, Cancer, AAA    Social History:  The patient presents to the ED with spouse  PCP: Markel Kauffman     Physical Exam     Patient Vitals for the past 24 hrs:   BP Temp Temp src Pulse Resp SpO2 Weight   11/18/22 2133 121/68 -- -- 78 16  99 % --   11/18/22 1602 113/55 97.4  F (36.3  C) Temporal 77 20 99 % 82.6 kg (182 lb)       Physical Exam  General: Resting on the bed.  Head: No obvious trauma to head.  Ears, Nose, Throat:  External ears normal.  Nose normal.   Eyes:  Conjunctivae clear.   CV: Regular rate and rhythm.  No murmurs.      Respiratory: Effort normal and breath sounds normal.  No wheezing or crackles.   Gastrointestinal: Soft.  No distension. There is left lower quadrant tenderness.  There is no rigidity, no rebound and no guarding.   Musculoskeletal: No CVA tenderness.  Nontender lumbar spine to midline palpation.  Tenderness over the left SI joint.  Neuro: Alert. Moving all extremities appropriately.  Normal speech.  No saddle anesthesia.  2+ patellar reflexes.  Lower extremity strength 5 out of 5.  Sensation intact to light touch.  Skin: Skin is warm and dry.  No rash noted.       Emergency Department Course     Imaging:  CT Abdomen Pelvis w Contrast   Final Result   IMPRESSION:    1.  No acute findings or inflammatory changes in the abdomen or pelvis.      2.  Unchanged infrarenal abdominal aortic aneurysm measuring 3.3 cm.        Report per radiology    Laboratory:  Labs Ordered and Resulted from Time of ED Arrival to Time of ED Departure   BASIC METABOLIC PANEL - Abnormal       Result Value    Sodium 135      Potassium 4.1      Chloride 102      Carbon Dioxide (CO2) 25      Anion Gap 8      Urea Nitrogen 19      Creatinine 0.74      Calcium 9.2      Glucose 371 (*)     GFR Estimate >90     CBC WITH PLATELETS AND DIFFERENTIAL    WBC Count 5.8      RBC Count 4.74      Hemoglobin 13.9      Hematocrit 41.1      MCV 87      MCH 29.3      MCHC 33.8      RDW 12.9      Platelet Count 196      % Neutrophils 61      % Lymphocytes 24      % Monocytes 11      % Eosinophils 4      % Basophils 0      % Immature Granulocytes 0      NRBCs per 100 WBC 0      Absolute Neutrophils 3.4      Absolute Lymphocytes 1.4      Absolute Monocytes 0.7       Absolute Eosinophils 0.3      Absolute Basophils 0.0      Absolute Immature Granulocytes 0.0      Absolute NRBCs 0.0     ROUTINE UA WITH MICROSCOPIC REFLEX TO CULTURE      Emergency Department Course:    Reviewed:  I reviewed nursing notes, vitals, past medical history and Care Everywhere    Assessments:  ED Course as of 22 I obtained history and examined the patient.      Interventions:  Medications   Saline Flush - CT (66 mLs Intravenous Given 22)   iopamidol (ISOVUE-370) solution 92 mL (92 mLs Intravenous Given 22)     Disposition:  The patient was discharged to home.     Impression & Plan     CMS Diagnoses: None    Medical Decision Makin-year-old male presents with left lower quadrant pain.  Vital signs are reassuring.  Broad differential was pursued include not limited to AAA, dissection, diverticulitis, colitis, electrolyte, metabolic, renal dysfunction, referred pain, etc.  Patient is currently being worked up for a left lower leg radiculopathy.  Had MRI on Tuesday and is to follow-up with his orthopedist on Tuesday.  This is not new or different.  No saddle anesthesia, no blood thinners, no IV street drug use.  No high risk features indicate emergent MRI from the ER today.  He presents for left lower quadrant pain.  CT was obtained.  CBC shows no leukocytosis or anemia.  BMP without acute electrolyte, metabolic or renal dysfunction.  Isolated hyperglycemia that is chronic.  They are working on his medications long-term.  No signs of DKA.  No dysuria, frequency or hematuria.  No indication for urine analysis at this time.  CT shows no acute findings or inflammatory change.  Unchanged infrarenal abdominal aortic aneurysm.  Patient was informed of this.  At this point patient quite looks quite well.  I see no acute etiologies of his left lower quadrant pain.  This may be referred pain from his ongoing left lower leg radiculopathy.  Given that he  is already being worked up and has already had MRI I do not think that additional work-up from the ER is indicated.  Recommend close follow-up with orthopedist.  Return precautions were discussed.  Patient was discharged home.    Diagnosis:    ICD-10-CM    1. LLQ abdominal pain  R10.32           Discharge Medications:  Discharge Medication List as of 11/18/2022  9:30 PM          Scribe Disclosure:  I, EMMY PAEZ, am serving as a scribe at 9:09 PM on 11/18/2022 to document services personally performed by Dr. Raheem MD based on my observations and the provider's statements to me.      Margo Maciel MD  11/18/22 [id number]

## 2022-11-22 ENCOUNTER — TRANSFERRED RECORDS (OUTPATIENT)
Dept: HEALTH INFORMATION MANAGEMENT | Facility: CLINIC | Age: 72
End: 2022-11-22

## 2023-01-17 DIAGNOSIS — E11.21 TYPE 2 DIABETES MELLITUS WITH DIABETIC NEPHROPATHY, WITHOUT LONG-TERM CURRENT USE OF INSULIN (H): ICD-10-CM

## 2023-01-17 RX ORDER — GLIMEPIRIDE 2 MG/1
TABLET ORAL
Qty: 90 TABLET | Refills: 0 | Status: SHIPPED | OUTPATIENT
Start: 2023-01-17 | End: 2023-01-20

## 2023-01-19 NOTE — PROGRESS NOTES
"SUBJECTIVE:   Jakub is a 72 year old who presents for Preventive Visit and follow-up regarding diabetes, hyperlipidemia, hypertension     Patient has been advised of split billing requirements and indicates understanding: Yes  Are you in the first 12 months of your Medicare coverage?  No    Healthy Habits:    In general, how would you rate your overall health?  Good    Frequency of exercise:  2-3 days/week    Duration of exercise:  45-60 minutes    Do you usually eat at least 4 servings of fruit and vegetables a day, include whole grains    & fiber and avoid regularly eating high fat or \"junk\" foods?  Yes    Taking medications regularly:  Yes (Incomplete)    Barriers to taking medications:  None    Medication side effects:  None (Incomplete)    Ability to successfully perform activities of daily living:  No assistance needed (Incomplete)    Home Safety:  No safety concerns identified (Incomplete)    Hearing Impairment:  Difficulty following a conversation in a noisy restaurant or crowded room and difficulty understanding soft or whispered speech (Incomplete)    In the past 6 months, have you been bothered by leaking of urine?  No (Incomplete)    In general, how would you rate your overall mental or emotional health?  Excellent      PHQ-2 Total Score:    Additional concerns today:  NoPHQ2 Score: Incomplete.      Have you ever done Advance Care Planning? (For example, a Health Directive, POLST, or a discussion with a medical provider or your loved ones about your wishes): Yes, patient states has an Advance Care Planning document and will bring a copy to the clinic.    Fall risk  Fallen 2 or more times in the past year?: No  Any fall with injury in the past year?: No    Cognitive Screening   1) Repeat 3 items (Leader, Season, Table)    2) Clock draw: NORMAL  3) 3 item recall: Recalls 3 objects  Results: 3 items recalled: COGNITIVE IMPAIRMENT LESS LIKELY    Mini-CogTM Copyright S Evelyn. Licensed by the author for use " in [facility]; reprinted with permission (boni@[facility]). All rights reserved.      Do you have sleep apnea, excessive snoring or daytime drowsiness?: no    Reviewed and updated as needed this visit by clinical staff   Tobacco  Allergies  Meds              Reviewed and updated as needed this visit by Provider                 Social History     Tobacco Use     Smoking status: Light Smoker     Packs/day: 0.00     Years: 0.00     Pack years: 0.00     Types: Cigars, Cigarettes     Start date: 1968     Last attempt to quit: 2001     Years since quittin.0     Smokeless tobacco: Never   Substance Use Topics     Alcohol use: Not Currently     Comment: No alcohol since February         Alcohol Use 2023   Prescreen: >3 drinks/day or >7 drinks/week? No   Prescreen: >3 drinks/day or >7 drinks/week? -   AUDIT SCORE  -       Component      Latest Ref Rng & Units 2022 2022 10/26/2022 2022   WBC      4.0 - 11.0 10e3/uL    5.8   RBC Count      4.40 - 5.90 10e6/uL    4.74   Hemoglobin      13.3 - 17.7 g/dL    13.9   Hematocrit      40.0 - 53.0 %    41.1   MCV      78 - 100 fL    87   MCH      26.5 - 33.0 pg    29.3   MCHC      31.5 - 36.5 g/dL    33.8   RDW      10.0 - 15.0 %    12.9   Platelet Count      150 - 450 10e3/uL    196   % Neutrophils      %    61   % Lymphocytes      %    24   % Monocytes      %    11   % Eosinophils      %    4   % Basophils      %    0   % Immature Granulocytes      %    0   NRBCs per 100 WBC      <1 /100    0   Absolute Neutrophils      1.6 - 8.3 10e3/uL    3.4   Absolute Lymphocytes      0.8 - 5.3 10e3/uL    1.4   Absolute Monocytes      0.0 - 1.3 10e3/uL    0.7   Absolute Eosinophils      0.0 - 0.7 10e3/uL    0.3   Absolute Basophils      0.0 - 0.2 10e3/uL    0.0   Absolute Immature Granulocytes      <=0.4 10e3/uL    0.0   Absolute NRBCs      10e3/uL    0.0   Sodium      133 - 144 mmol/L    135   Potassium      3.4 - 5.3 mmol/L    4.1   Chloride       94 - 109 mmol/L    102   Carbon Dioxide      20 - 32 mmol/L    25   Anion Gap      3 - 14 mmol/L    8   Urea Nitrogen      7 - 30 mg/dL    19   Creatinine      0.66 - 1.25 mg/dL    0.74   Calcium      8.5 - 10.1 mg/dL    9.2   Glucose      70 - 99 mg/dL    371 (H)   GFR Estimate      >60 mL/min/1.73m2    >90   Cholesterol      <200 mg/dL 112      Triglycerides      <150 mg/dL 48      HDL Cholesterol      >=40 mg/dL 58      LDL Cholesterol Calculated      <=100 mg/dL 44      Non HDL Cholesterol      <130 mg/dL 54      Patient Fasting > 8hrs?       Yes      Creatinine Urine      mg/dL 105      Albumin Urine mg/L      mg/L 10      Albumin Urine mg/g Cr      0.00 - 17.00 mg/g Cr 9.52      Hemoglobin A1C      0.0 - 5.6 % 9.2 (H) 9.8 (H) 8.0 (H)    PSA      0.00 - 4.00 ug/L 1.46      Ferritin      26 - 388 ng/mL  89               Current providers sharing in care for this patient include:   Patient Care Team:  Markel Kauffman MD as PCP - General (Internal Medicine)  Markel Kauffman MD as Assigned PCP    The following health maintenance items are reviewed in Epic and correct as of today:  Health Maintenance   Topic Date Due     LUNG CANCER SCREENING  Never done     DIABETIC FOOT EXAM  07/12/2022     MEDICARE ANNUAL WELLNESS VISIT  12/13/2022     A1C  04/26/2023     ALT  05/25/2023     LIPID  05/25/2023     MICROALBUMIN  05/25/2023     ANNUAL REVIEW OF HM ORDERS  07/25/2023     EYE EXAM  09/28/2023     BMP  11/18/2023     NICOTINE/TOBACCO CESSATION COUNSELING Q 1 YR  01/20/2024     FALL RISK ASSESSMENT  01/20/2024     PSA  05/25/2024     COLORECTAL CANCER SCREENING  02/04/2025     ADVANCE CARE PLANNING  12/13/2026     DTAP/TDAP/TD IMMUNIZATION (3 - Td or Tdap) 08/01/2031     PHQ-2 (once per calendar year)  Completed     INFLUENZA VACCINE  Completed     Pneumococcal Vaccine: 65+ Years  Completed     ZOSTER IMMUNIZATION  Completed     AORTIC ANEURYSM SCREENING (SYSTEM ASSIGNED)  Completed     COVID-19 Vaccine  Completed      HEPATITIS C SCREENING  Addressed     IPV IMMUNIZATION  Aged Out     MENINGITIS IMMUNIZATION  Aged Out     Labs reviewed in EPIC          Review of Systems   Constitutional: Negative for chills (Incomplete) and fever (Incomplete).   HENT: Negative for congestion (Incomplete), ear pain (Incomplete), hearing loss (Incomplete) and sore throat (Incomplete).    Eyes: Negative for pain (Incomplete) and visual disturbance (Incomplete).   Respiratory: Negative for cough (Incomplete) and shortness of breath (Incomplete).    Cardiovascular: Negative for chest pain (Incomplete), palpitations (Incomplete) and peripheral edema (Incomplete).   Gastrointestinal: Positive for abdominal pain (Incomplete) and constipation (Incomplete). Negative for diarrhea (Incomplete), heartburn (Incomplete), hematochezia (Incomplete) and nausea (Incomplete).   Genitourinary: Positive for impotence (Incomplete). Negative for dysuria (Incomplete), frequency (Incomplete), genital sores (Incomplete), hematuria (Incomplete), penile discharge (Incomplete) and urgency (Incomplete).   Musculoskeletal: Positive for arthralgias (Incomplete). Negative for joint swelling (Incomplete) and myalgias (Incomplete).   Skin: Negative for rash (Incomplete).   Neurological: Negative for dizziness (Incomplete), weakness (Incomplete), headaches (Incomplete) and paresthesias (Incomplete).   Psychiatric/Behavioral: Negative for mood changes (Incomplete). The patient is not nervous/anxious (Incomplete).       Eye exam  Summer 2022 at [facility]   Occ LLQ pain  Since October. No change with BMs.   Slight constipation. No  Change diet or OTC treatment.  No pain now. CT abdomen in November 2022 unremarkable except for stable 3.3 cm AAA   ED better with Viagra   Mild bilateral hip  Pain. Not limiting acitivity. No OTCs. Pain in bursa areas. Nor in groin  Not checking sugars at home. Last A1C improved to 8.0       OBJECTIVE:   /72   Pulse 69   Temp 98.8  F (37.1  " C) (Temporal)   Ht 1.717 m (5' 7.6\")   Wt 81.4 kg (179 lb 8 oz)   SpO2 100%   BMI 27.62 kg/m   Estimated body mass index is 27.62 kg/m  as calculated from the following:    Height as of this encounter: 1.717 m (5' 7.6\").    Weight as of this encounter: 81.4 kg (179 lb 8 oz).  Physical Exam  General appearance - healthy, alert, no distress  Skin - No rashes or lesions.  Head - normocephalic, atraumatic  Eyes - PARDEEP, EOMI, fundi exam with nondilated pupils negative.  Ears - External ears normal. Canals clear. TM's normal.  Nose/Sinuses - Nares normal. Septum midline. Mucosa normal. No drainage or sinus tenderness.  Oropharynx - No erythema, no adenopathy, no exudates.  Neck - Supple without adenopathy or thyromegaly. No bruits.  Lungs - Clear to auscultation without wheezes/rhonchi.  Heart - Regular rate and rhythm without murmurs, clicks, or gallops.  Nodes - No supraclavicular, axillary, or inguinal adenopathy palpable.  Abdomen - Abdomen soft, non-tender. BS normal. No masses or hepatosplenomegaly palpable. No bruits.  Extremities -No cyanosis, clubbing or edema.    Musculoskeletal - Spine ROM normal. Muscular strength intact.   Mild tenderness to palpation bilateral trochanteric bursa.  No tenderness to internal/external rotation or flexion/extension of hip joints  Peripheral pulses - radial=4/4, femoral=4/4, posterior tibial=4/4, dorsalis pedis=4/4,  Neuro - Gait normal. Reflexes normal and symmetric. Sensation grossly WNL.  Genital - Normal-appearing male external genitalia. No scrotal masses or inguinal hernia palpable.   Rectal -        ASSESSMENT / PLAN:   1. Medicare annual wellness visit, subsequent  Colon/prostate cancer screening up-to-date.  Vaccinations up-to-date.  Independent in ADLs.  Smoking cigars intermittently and counseled regarding discontinuation    2. Hyperlipidemia LDL goal <70  On statin therapy.  Previous lipids at goal.  Repeat labs 3 months  - Comprehensive metabolic panel; " "Future  - Lipid panel reflex to direct LDL Fasting; Future    3. Type 2 diabetes mellitus with microalbuminuria, without long-term current use of insulin (H)  Not checking blood sugars at home.  Patient has improved diabetic diet and A1c improved from 9.6 down to 8.0.  Prefer A1c closer to the 7.5 with history CAD.  Not willing to increase metformin further as he thinks this caused his previous GI bleed.  On glimepiride.  Previously recommended using Jardiance instead of glimepiride but per patient's review of medication cost with pharmacy, it was too expensive.  Encourage patient to reduce carbohydrate intake in diet and increase walking/exercise and recheck A1c in 3 months  - FOOT EXAM  - Hemoglobin A1c; Future  - Comprehensive metabolic panel; Future  - Albumin Random Urine Quantitative with Creat Ratio; Future    4. Essential hypertension  Controlled.  Continue ARB therapy  - Comprehensive metabolic panel; Future    5. Trochanteric bursitis of both hips  Mild tenderness bilateral trochanter bursa.  Nontender to hip range of motion.  Patient given stretching exercises to perform.  Icing the bursa area at night as needed    6. Abdominal aortic aneurysm (AAA) without rupture, unspecified part  3.3 cm on recent CT.  Repeat ultrasound imaging in 1 year      Patient has been advised of split billing requirements and indicates understanding: Yes      COUNSELING:  Reviewed preventive health counseling, as reflected in patient instructions      BMI:   Estimated body mass index is 27.62 kg/m  as calculated from the following:    Height as of this encounter: 1.717 m (5' 7.6\").    Weight as of this encounter: 81.4 kg (179 lb 8 oz).         He reports that he has been smoking cigars and cigarettes. He started smoking about 55 years ago. He has never used smokeless tobacco.  Nicotine/Tobacco Cessation Plan:   Information offered: Patient not interested at this time      Appropriate preventive services were discussed with this " patient, including applicable screening as appropriate for cardiovascular disease, diabetes, osteopenia/osteoporosis, and glaucoma.  As appropriate for age/gender, discussed screening for colorectal cancer, prostate cancer, breast cancer, and cervical cancer. Checklist reviewing preventive services available has been given to the patient.    Reviewed patients plan of care and provided an AVS. The Basic Care Plan (routine screening as documented in Health Maintenance) for Jakub meets the Care Plan requirement. This Care Plan has been established and reviewed with the Patient and wife.     PLAN:  Continue current medications  Prescriptions refilled on file     Call  [phone] or use [facility] to schedule a future lab appointment  fasting in  mid March 2023    For fasting labs, please refrain from eating for 8 hours or more.   Drink 2 glasses of water before your lab appointment. It is fine to take your  oral medications on the morning of the lab test as usual    Continue physical therapy exercises for back   Stretching of bilateral IT bands in AM for bursa soreness. Ice later part of the day directly to bursa areas as needed  Continue diabetic diet  Pt was informed regarding extra E&M billing for management of new or established medical issues not related to today's wellness/screening visit    Markel Kauffman MD  [facility]

## 2023-01-20 ENCOUNTER — OFFICE VISIT (OUTPATIENT)
Dept: INTERNAL MEDICINE | Facility: CLINIC | Age: 73
End: 2023-01-20
Payer: MEDICARE

## 2023-01-20 ENCOUNTER — MYC MEDICAL ADVICE (OUTPATIENT)
Dept: INTERNAL MEDICINE | Facility: CLINIC | Age: 73
End: 2023-01-20

## 2023-01-20 VITALS
DIASTOLIC BLOOD PRESSURE: 72 MMHG | BODY MASS INDEX: 27.2 KG/M2 | SYSTOLIC BLOOD PRESSURE: 115 MMHG | HEIGHT: 68 IN | OXYGEN SATURATION: 100 % | HEART RATE: 69 BPM | WEIGHT: 179.5 LBS | TEMPERATURE: 98.8 F

## 2023-01-20 DIAGNOSIS — I71.40 ABDOMINAL AORTIC ANEURYSM (AAA) WITHOUT RUPTURE, UNSPECIFIED PART (H): ICD-10-CM

## 2023-01-20 DIAGNOSIS — I10 ESSENTIAL HYPERTENSION: ICD-10-CM

## 2023-01-20 DIAGNOSIS — M70.61 TROCHANTERIC BURSITIS OF BOTH HIPS: ICD-10-CM

## 2023-01-20 DIAGNOSIS — E11.29 TYPE 2 DIABETES MELLITUS WITH MICROALBUMINURIA, WITHOUT LONG-TERM CURRENT USE OF INSULIN (H): ICD-10-CM

## 2023-01-20 DIAGNOSIS — E11.21 TYPE 2 DIABETES MELLITUS WITH DIABETIC NEPHROPATHY, WITHOUT LONG-TERM CURRENT USE OF INSULIN (H): ICD-10-CM

## 2023-01-20 DIAGNOSIS — Z00.00 MEDICARE ANNUAL WELLNESS VISIT, SUBSEQUENT: Primary | ICD-10-CM

## 2023-01-20 DIAGNOSIS — E78.5 HYPERLIPIDEMIA LDL GOAL <70: ICD-10-CM

## 2023-01-20 DIAGNOSIS — R80.9 TYPE 2 DIABETES MELLITUS WITH MICROALBUMINURIA, WITHOUT LONG-TERM CURRENT USE OF INSULIN (H): ICD-10-CM

## 2023-01-20 DIAGNOSIS — M70.62 TROCHANTERIC BURSITIS OF BOTH HIPS: ICD-10-CM

## 2023-01-20 PROCEDURE — 99214 OFFICE O/P EST MOD 30 MIN: CPT | Mod: 25 | Performed by: INTERNAL MEDICINE

## 2023-01-20 PROCEDURE — G0439 PPPS, SUBSEQ VISIT: HCPCS | Performed by: INTERNAL MEDICINE

## 2023-01-20 PROCEDURE — 99207 PR FOOT EXAM NO CHARGE: CPT | Mod: 25 | Performed by: INTERNAL MEDICINE

## 2023-01-20 RX ORDER — GLIMEPIRIDE 2 MG/1
TABLET ORAL
Qty: 90 TABLET | Refills: 0 | Status: SHIPPED | OUTPATIENT
Start: 2023-01-20 | End: 2023-01-22

## 2023-01-20 RX ORDER — ATORVASTATIN CALCIUM 40 MG/1
40 TABLET, FILM COATED ORAL DAILY
Qty: 90 TABLET | Refills: 3 | Status: SHIPPED | OUTPATIENT
Start: 2023-01-20 | End: 2023-01-22

## 2023-01-20 RX ORDER — LOSARTAN POTASSIUM 50 MG/1
50 TABLET ORAL DAILY
Qty: 90 TABLET | Refills: 3 | Status: SHIPPED | OUTPATIENT
Start: 2023-01-20 | End: 2023-01-22

## 2023-01-20 ASSESSMENT — ENCOUNTER SYMPTOMS
SORE THROAT: 0
ARTHRALGIAS: 1
SHORTNESS OF BREATH: 0
PARESTHESIAS: 0
HEADACHES: 0
JOINT SWELLING: 0
DIARRHEA: 0
PALPITATIONS: 0
DYSURIA: 0
WEAKNESS: 0
MYALGIAS: 0
NERVOUS/ANXIOUS: 0
NAUSEA: 0
COUGH: 0
CONSTIPATION: 1
HEMATURIA: 0
CHILLS: 0
HEMATOCHEZIA: 0
DIZZINESS: 0
HEARTBURN: 0
FREQUENCY: 0
EYE PAIN: 0
ABDOMINAL PAIN: 1
FEVER: 0

## 2023-01-20 ASSESSMENT — ACTIVITIES OF DAILY LIVING (ADL): CURRENT_FUNCTION: NO ASSISTANCE NEEDED

## 2023-01-20 NOTE — PATIENT INSTRUCTIONS
Continue current medications  Prescriptions refilled on file     Call  [phone] or use [facility] to schedule a future lab appointment  fasting in  mid March 2023    For fasting labs, please refrain from eating for 8 hours or more.   Drink 2 glasses of water before your lab appointment. It is fine to take your  oral medications on the morning of the lab test as usual    Continue physical therapy exercises for back   Stretching of bilateral IT bands in AM for bursa soreness. Ice later part of the day directly to bursa areas as needed  Continue diabetic diet  Pt was informed regarding extra E&M billing for management of new or established medical issues not related to today's wellness/screening visit

## 2023-01-22 RX ORDER — GLIMEPIRIDE 2 MG/1
TABLET ORAL
Qty: 90 TABLET | Refills: 3 | Status: SHIPPED | OUTPATIENT
Start: 2023-01-22

## 2023-01-22 RX ORDER — ATORVASTATIN CALCIUM 40 MG/1
40 TABLET, FILM COATED ORAL DAILY
Qty: 90 TABLET | Refills: 3 | Status: SHIPPED | OUTPATIENT
Start: 2023-01-22

## 2023-01-22 RX ORDER — LOSARTAN POTASSIUM 50 MG/1
50 TABLET ORAL DAILY
Qty: 90 TABLET | Refills: 3 | Status: SHIPPED | OUTPATIENT
Start: 2023-01-22

## 2023-02-23 ENCOUNTER — LAB (OUTPATIENT)
Dept: LAB | Facility: CLINIC | Age: 73
End: 2023-02-23
Payer: MEDICARE

## 2023-02-23 DIAGNOSIS — E11.29 TYPE 2 DIABETES MELLITUS WITH MICROALBUMINURIA, WITHOUT LONG-TERM CURRENT USE OF INSULIN (H): ICD-10-CM

## 2023-02-23 DIAGNOSIS — R80.9 TYPE 2 DIABETES MELLITUS WITH MICROALBUMINURIA, WITHOUT LONG-TERM CURRENT USE OF INSULIN (H): ICD-10-CM

## 2023-02-23 DIAGNOSIS — I10 ESSENTIAL HYPERTENSION: ICD-10-CM

## 2023-02-23 DIAGNOSIS — E78.5 HYPERLIPIDEMIA LDL GOAL <70: ICD-10-CM

## 2023-02-23 LAB
ALBUMIN SERPL BCG-MCNC: 4.7 G/DL (ref 3.5–5.2)
ALP SERPL-CCNC: 55 U/L (ref 40–129)
ALT SERPL W P-5'-P-CCNC: 14 U/L (ref 10–50)
ANION GAP SERPL CALCULATED.3IONS-SCNC: 12 MMOL/L (ref 7–15)
AST SERPL W P-5'-P-CCNC: 25 U/L (ref 10–50)
BILIRUB SERPL-MCNC: 0.4 MG/DL
BUN SERPL-MCNC: 16.8 MG/DL (ref 8–23)
CALCIUM SERPL-MCNC: 9.1 MG/DL (ref 8.8–10.2)
CHLORIDE SERPL-SCNC: 102 MMOL/L (ref 98–107)
CHOLEST SERPL-MCNC: 116 MG/DL
CREAT SERPL-MCNC: 0.7 MG/DL (ref 0.67–1.17)
CREAT UR-MCNC: 35.6 MG/DL
DEPRECATED HCO3 PLAS-SCNC: 23 MMOL/L (ref 22–29)
GFR SERPL CREATININE-BSD FRML MDRD: >90 ML/MIN/1.73M2
GLUCOSE SERPL-MCNC: 160 MG/DL (ref 70–99)
HBA1C MFR BLD: 8.7 % (ref 0–5.6)
HDLC SERPL-MCNC: 52 MG/DL
LDLC SERPL CALC-MCNC: 56 MG/DL
MICROALBUMIN UR-MCNC: <12 MG/L
MICROALBUMIN/CREAT UR: NORMAL MG/G{CREAT}
NONHDLC SERPL-MCNC: 64 MG/DL
POTASSIUM SERPL-SCNC: 4.6 MMOL/L (ref 3.4–5.3)
PROT SERPL-MCNC: 7.3 G/DL (ref 6.4–8.3)
SODIUM SERPL-SCNC: 137 MMOL/L (ref 136–145)
TRIGL SERPL-MCNC: 42 MG/DL

## 2023-02-23 PROCEDURE — 80061 LIPID PANEL: CPT

## 2023-02-23 PROCEDURE — 83036 HEMOGLOBIN GLYCOSYLATED A1C: CPT

## 2023-02-23 PROCEDURE — 36415 COLL VENOUS BLD VENIPUNCTURE: CPT

## 2023-02-23 PROCEDURE — 82570 ASSAY OF URINE CREATININE: CPT

## 2023-02-23 PROCEDURE — 80053 COMPREHEN METABOLIC PANEL: CPT

## 2023-02-23 PROCEDURE — 82043 UR ALBUMIN QUANTITATIVE: CPT

## 2023-03-19 DIAGNOSIS — I10 ESSENTIAL HYPERTENSION: ICD-10-CM

## 2023-03-19 DIAGNOSIS — E11.29 TYPE 2 DIABETES MELLITUS WITH MICROALBUMINURIA, WITHOUT LONG-TERM CURRENT USE OF INSULIN (H): ICD-10-CM

## 2023-03-19 DIAGNOSIS — R80.9 TYPE 2 DIABETES MELLITUS WITH MICROALBUMINURIA, WITHOUT LONG-TERM CURRENT USE OF INSULIN (H): ICD-10-CM

## 2023-03-21 RX ORDER — LOSARTAN POTASSIUM 50 MG/1
TABLET ORAL
Qty: 90 TABLET | Refills: 3 | OUTPATIENT
Start: 2023-03-21

## 2023-04-27 NOTE — TELEPHONE ENCOUNTER
Refill request too early. Both were approved on 1/22/23 for 90 tablets and 3 refills.   
supervision

## 2023-07-12 ENCOUNTER — TRANSFERRED RECORDS (OUTPATIENT)
Dept: HEALTH INFORMATION MANAGEMENT | Facility: CLINIC | Age: 73
End: 2023-07-12
Payer: MEDICARE

## 2023-08-27 ENCOUNTER — HEALTH MAINTENANCE LETTER (OUTPATIENT)
Age: 73
End: 2023-08-27

## 2023-12-27 ENCOUNTER — PATIENT OUTREACH (OUTPATIENT)
Dept: GASTROENTEROLOGY | Facility: CLINIC | Age: 73
End: 2023-12-27
Payer: MEDICARE

## 2024-01-04 ENCOUNTER — PATIENT OUTREACH (OUTPATIENT)
Dept: CARE COORDINATION | Facility: CLINIC | Age: 74
End: 2024-01-04
Payer: MEDICARE

## 2024-01-18 ENCOUNTER — PATIENT OUTREACH (OUTPATIENT)
Dept: CARE COORDINATION | Facility: CLINIC | Age: 74
End: 2024-01-18
Payer: MEDICARE

## 2024-03-24 ENCOUNTER — HEALTH MAINTENANCE LETTER (OUTPATIENT)
Age: 74
End: 2024-03-24

## 2024-05-24 ENCOUNTER — PATIENT OUTREACH (OUTPATIENT)
Dept: GASTROENTEROLOGY | Facility: CLINIC | Age: 74
End: 2024-05-24
Payer: MEDICARE

## 2024-10-20 ENCOUNTER — HEALTH MAINTENANCE LETTER (OUTPATIENT)
Age: 74
End: 2024-10-20

## 2025-01-29 ENCOUNTER — PATIENT OUTREACH (OUTPATIENT)
Dept: CARE COORDINATION | Facility: CLINIC | Age: 75
End: 2025-01-29
Payer: MEDICARE

## 2025-05-03 ENCOUNTER — HEALTH MAINTENANCE LETTER (OUTPATIENT)
Age: 75
End: 2025-05-03

## 2025-08-26 ENCOUNTER — TRANSFERRED RECORDS (OUTPATIENT)
Dept: HEALTH INFORMATION MANAGEMENT | Facility: CLINIC | Age: 75
End: 2025-08-26
Payer: MEDICARE

## (undated) RX ORDER — FENTANYL CITRATE 50 UG/ML
INJECTION, SOLUTION INTRAMUSCULAR; INTRAVENOUS
Status: DISPENSED
Start: 2022-02-03

## (undated) RX ORDER — FENTANYL CITRATE 50 UG/ML
INJECTION, SOLUTION INTRAMUSCULAR; INTRAVENOUS
Status: DISPENSED
Start: 2022-02-06

## (undated) RX ORDER — FENTANYL CITRATE 50 UG/ML
INJECTION, SOLUTION INTRAMUSCULAR; INTRAVENOUS
Status: DISPENSED
Start: 2022-02-04